# Patient Record
Sex: MALE | Race: WHITE | Employment: FULL TIME | URBAN - METROPOLITAN AREA
[De-identification: names, ages, dates, MRNs, and addresses within clinical notes are randomized per-mention and may not be internally consistent; named-entity substitution may affect disease eponyms.]

---

## 2017-03-30 ENCOUNTER — OFFICE VISIT (OUTPATIENT)
Dept: FAMILY MEDICINE CLINIC | Age: 42
End: 2017-03-30
Payer: COMMERCIAL

## 2017-03-30 ENCOUNTER — HOSPITAL ENCOUNTER (OUTPATIENT)
Age: 42
Discharge: HOME | End: 2017-03-30
Payer: COMMERCIAL

## 2017-03-30 VITALS
SYSTOLIC BLOOD PRESSURE: 140 MMHG | BODY MASS INDEX: 32.48 KG/M2 | OXYGEN SATURATION: 97 % | DIASTOLIC BLOOD PRESSURE: 80 MMHG | HEART RATE: 97 BPM | WEIGHT: 253 LBS

## 2017-03-30 DIAGNOSIS — B18.2 HEP C W/O COMA, CHRONIC (HCC): ICD-10-CM

## 2017-03-30 DIAGNOSIS — Z20.5 EXPOSURE TO HEPATITIS B: ICD-10-CM

## 2017-03-30 DIAGNOSIS — B18.2 HEP C W/O COMA, CHRONIC (HCC): Primary | ICD-10-CM

## 2017-03-30 DIAGNOSIS — Z20.6 EXPOSURE TO HIV: ICD-10-CM

## 2017-03-30 LAB
ALBUMIN SERPL-MCNC: 4 G/DL (ref 3.5–5.2)
ALBUMIN/GLOBULIN RATIO: ABNORMAL (ref 1–2.5)
ALP BLD-CCNC: 58 U/L (ref 40–129)
ALT SERPL-CCNC: 42 U/L (ref 5–41)
ANION GAP SERPL CALCULATED.3IONS-SCNC: 11 MMOL/L (ref 9–17)
AST SERPL-CCNC: 36 U/L
BILIRUB SERPL-MCNC: 0.73 MG/DL (ref 0.3–1.2)
BUN BLDV-MCNC: 13 MG/DL (ref 6–20)
BUN/CREAT BLD: 18 (ref 9–20)
CALCIUM SERPL-MCNC: 9.1 MG/DL (ref 8.6–10.4)
CHLORIDE BLD-SCNC: 102 MMOL/L (ref 98–107)
CO2: 26 MMOL/L (ref 20–31)
CREAT SERPL-MCNC: 0.72 MG/DL (ref 0.7–1.2)
GFR AFRICAN AMERICAN: >60 ML/MIN
GFR NON-AFRICAN AMERICAN: >60 ML/MIN
GFR SERPL CREATININE-BSD FRML MDRD: ABNORMAL ML/MIN/{1.73_M2}
GFR SERPL CREATININE-BSD FRML MDRD: ABNORMAL ML/MIN/{1.73_M2}
GLUCOSE BLD-MCNC: 99 MG/DL (ref 70–99)
HEPATITIS B CORE TOTAL ANTIBODY: REACTIVE
HIV AG/AB: NONREACTIVE
POTASSIUM SERPL-SCNC: 3.9 MMOL/L (ref 3.7–5.3)
SODIUM BLD-SCNC: 139 MMOL/L (ref 135–144)
TOTAL PROTEIN: 7.3 G/DL (ref 6.4–8.3)

## 2017-03-30 PROCEDURE — G8427 DOCREV CUR MEDS BY ELIG CLIN: HCPCS | Performed by: FAMILY MEDICINE

## 2017-03-30 PROCEDURE — 87522 HEPATITIS C REVRS TRNSCRPJ: CPT

## 2017-03-30 PROCEDURE — 99213 OFFICE O/P EST LOW 20 MIN: CPT | Performed by: FAMILY MEDICINE

## 2017-03-30 PROCEDURE — G8484 FLU IMMUNIZE NO ADMIN: HCPCS | Performed by: FAMILY MEDICINE

## 2017-03-30 PROCEDURE — 36415 COLL VENOUS BLD VENIPUNCTURE: CPT

## 2017-03-30 PROCEDURE — G8419 CALC BMI OUT NRM PARAM NOF/U: HCPCS | Performed by: FAMILY MEDICINE

## 2017-03-30 PROCEDURE — 87902 NFCT AGT GNTYP ALYS HEP C: CPT

## 2017-03-30 PROCEDURE — 80053 COMPREHEN METABOLIC PANEL: CPT

## 2017-03-30 PROCEDURE — 86704 HEP B CORE ANTIBODY TOTAL: CPT

## 2017-03-30 PROCEDURE — 4004F PT TOBACCO SCREEN RCVD TLK: CPT | Performed by: FAMILY MEDICINE

## 2017-03-30 PROCEDURE — 87389 HIV-1 AG W/HIV-1&-2 AB AG IA: CPT

## 2017-03-30 RX ORDER — NICOTINE 21 MG/24HR
1 PATCH, TRANSDERMAL 24 HOURS TRANSDERMAL EVERY 24 HOURS
Qty: 28 PATCH | Refills: 0 | Status: SHIPPED | OUTPATIENT
Start: 2017-03-30 | End: 2017-05-02

## 2017-03-30 RX ORDER — BUPRENORPHINE HYDROCHLORIDE, NALOXONE HYDROCHLORIDE 8; 2 MG/1; MG/1
FILM, SOLUBLE BUCCAL; SUBLINGUAL
Refills: 0 | COMMUNITY
Start: 2017-03-13 | End: 2017-09-01 | Stop reason: ALTCHOICE

## 2017-03-30 RX ORDER — ONDANSETRON 4 MG/1
4 TABLET, FILM COATED ORAL EVERY 8 HOURS PRN
Qty: 30 TABLET | Refills: 2 | Status: SHIPPED | OUTPATIENT
Start: 2017-03-30 | End: 2017-09-01 | Stop reason: ALTCHOICE

## 2017-03-30 RX ORDER — ALLOPURINOL 300 MG/1
TABLET ORAL
Qty: 30 TABLET | Refills: 5 | Status: SHIPPED | OUTPATIENT
Start: 2017-03-30 | End: 2017-09-01 | Stop reason: SDUPTHER

## 2017-03-31 ENCOUNTER — TELEPHONE (OUTPATIENT)
Dept: FAMILY MEDICINE CLINIC | Age: 42
End: 2017-03-31

## 2017-04-04 LAB
DIRECT EXAM: ABNORMAL
Lab: ABNORMAL
SPECIMEN DESCRIPTION: ABNORMAL
SPECIMEN DESCRIPTION: ABNORMAL
STATUS: ABNORMAL

## 2017-04-05 LAB
HCV QUANTITATIVE: NORMAL
HEPATITIS C GENOTYPE: NORMAL

## 2017-04-07 ENCOUNTER — TELEPHONE (OUTPATIENT)
Dept: FAMILY MEDICINE CLINIC | Age: 42
End: 2017-04-07

## 2017-04-09 ASSESSMENT — ENCOUNTER SYMPTOMS
WHEEZING: 0
SHORTNESS OF BREATH: 0
COUGH: 0
PHOTOPHOBIA: 0
ABDOMINAL PAIN: 0
NAUSEA: 0
CONSTIPATION: 0
ABDOMINAL DISTENTION: 0
BACK PAIN: 0
COLOR CHANGE: 0
DIARRHEA: 0
TROUBLE SWALLOWING: 0
VOMITING: 0
FACIAL SWELLING: 0

## 2017-04-11 ENCOUNTER — TELEPHONE (OUTPATIENT)
Dept: FAMILY MEDICINE CLINIC | Age: 42
End: 2017-04-11

## 2017-04-11 DIAGNOSIS — R76.8 HEPATITIS B CORE ANTIBODY POSITIVE: Primary | ICD-10-CM

## 2017-04-20 DIAGNOSIS — M54.50 CHRONIC BILATERAL LOW BACK PAIN WITHOUT SCIATICA: ICD-10-CM

## 2017-04-20 DIAGNOSIS — G89.29 CHRONIC BILATERAL LOW BACK PAIN WITHOUT SCIATICA: ICD-10-CM

## 2017-04-20 RX ORDER — LORAZEPAM 0.5 MG/1
0.5 TABLET ORAL 2 TIMES DAILY PRN
Qty: 6 TABLET | Refills: 0 | Status: SHIPPED | OUTPATIENT
Start: 2017-04-20 | End: 2017-09-01 | Stop reason: ALTCHOICE

## 2017-04-20 RX ORDER — GABAPENTIN 600 MG/1
600 TABLET ORAL 3 TIMES DAILY
Qty: 90 TABLET | Refills: 2 | Status: SHIPPED | OUTPATIENT
Start: 2017-04-20 | End: 2017-07-08 | Stop reason: SDUPTHER

## 2017-05-02 ENCOUNTER — OFFICE VISIT (OUTPATIENT)
Dept: FAMILY MEDICINE CLINIC | Age: 42
End: 2017-05-02
Payer: COMMERCIAL

## 2017-05-02 VITALS
HEART RATE: 74 BPM | WEIGHT: 255 LBS | OXYGEN SATURATION: 98 % | SYSTOLIC BLOOD PRESSURE: 115 MMHG | BODY MASS INDEX: 32.74 KG/M2 | DIASTOLIC BLOOD PRESSURE: 74 MMHG

## 2017-05-02 DIAGNOSIS — F34.1 DYSTHYMIA: Primary | ICD-10-CM

## 2017-05-02 PROCEDURE — 4004F PT TOBACCO SCREEN RCVD TLK: CPT | Performed by: FAMILY MEDICINE

## 2017-05-02 PROCEDURE — 99213 OFFICE O/P EST LOW 20 MIN: CPT | Performed by: FAMILY MEDICINE

## 2017-05-02 PROCEDURE — G8419 CALC BMI OUT NRM PARAM NOF/U: HCPCS | Performed by: FAMILY MEDICINE

## 2017-05-02 PROCEDURE — G8427 DOCREV CUR MEDS BY ELIG CLIN: HCPCS | Performed by: FAMILY MEDICINE

## 2017-05-14 ASSESSMENT — ENCOUNTER SYMPTOMS
CONSTIPATION: 0
PHOTOPHOBIA: 0
VOMITING: 0
SHORTNESS OF BREATH: 0
BACK PAIN: 0
ABDOMINAL DISTENTION: 0
ABDOMINAL PAIN: 0
NAUSEA: 0
COLOR CHANGE: 0
TROUBLE SWALLOWING: 0
FACIAL SWELLING: 0
COUGH: 0
DIARRHEA: 0
WHEEZING: 0

## 2017-07-06 RX ORDER — PANTOPRAZOLE SODIUM 40 MG/1
TABLET, DELAYED RELEASE ORAL
Qty: 30 TABLET | Refills: 5 | Status: SHIPPED | OUTPATIENT
Start: 2017-07-06 | End: 2018-01-22 | Stop reason: SDUPTHER

## 2017-07-08 DIAGNOSIS — M54.50 CHRONIC BILATERAL LOW BACK PAIN WITHOUT SCIATICA: ICD-10-CM

## 2017-07-08 DIAGNOSIS — G89.29 CHRONIC BILATERAL LOW BACK PAIN WITHOUT SCIATICA: ICD-10-CM

## 2017-07-10 RX ORDER — GABAPENTIN 600 MG/1
TABLET ORAL
Qty: 90 TABLET | Refills: 1 | Status: SHIPPED | OUTPATIENT
Start: 2017-07-10 | End: 2017-09-01 | Stop reason: SDUPTHER

## 2017-09-01 ENCOUNTER — OFFICE VISIT (OUTPATIENT)
Dept: FAMILY MEDICINE CLINIC | Age: 42
End: 2017-09-01

## 2017-09-01 ENCOUNTER — HOSPITAL ENCOUNTER (OUTPATIENT)
Age: 42
Discharge: HOME | End: 2017-09-01

## 2017-09-01 VITALS
SYSTOLIC BLOOD PRESSURE: 136 MMHG | HEIGHT: 74 IN | BODY MASS INDEX: 35.04 KG/M2 | HEART RATE: 99 BPM | WEIGHT: 273 LBS | DIASTOLIC BLOOD PRESSURE: 88 MMHG

## 2017-09-01 DIAGNOSIS — R22.31 MASS OF RIGHT HAND: ICD-10-CM

## 2017-09-01 DIAGNOSIS — F41.9 ANXIETY: Primary | ICD-10-CM

## 2017-09-01 DIAGNOSIS — G89.29 CHRONIC BILATERAL LOW BACK PAIN WITHOUT SCIATICA: ICD-10-CM

## 2017-09-01 DIAGNOSIS — M54.50 CHRONIC BILATERAL LOW BACK PAIN WITHOUT SCIATICA: ICD-10-CM

## 2017-09-01 DIAGNOSIS — F51.01 PRIMARY INSOMNIA: ICD-10-CM

## 2017-09-01 LAB
ALBUMIN SERPL-MCNC: 3.8 G/DL (ref 3.5–5.2)
ALBUMIN/GLOBULIN RATIO: ABNORMAL (ref 1–2.5)
ALP BLD-CCNC: 72 U/L (ref 40–129)
ALT SERPL-CCNC: 179 U/L (ref 5–41)
ANION GAP SERPL CALCULATED.3IONS-SCNC: 22 MMOL/L (ref 9–17)
AST SERPL-CCNC: 105 U/L
BILIRUB SERPL-MCNC: 0.34 MG/DL (ref 0.3–1.2)
BUN BLDV-MCNC: 13 MG/DL (ref 6–20)
BUN/CREAT BLD: 19 (ref 9–20)
CALCIUM SERPL-MCNC: 8.9 MG/DL (ref 8.6–10.4)
CHLORIDE BLD-SCNC: 104 MMOL/L (ref 98–107)
CO2: 15 MMOL/L (ref 20–31)
CREAT SERPL-MCNC: 0.67 MG/DL (ref 0.7–1.2)
GFR AFRICAN AMERICAN: >60 ML/MIN
GFR NON-AFRICAN AMERICAN: >60 ML/MIN
GFR SERPL CREATININE-BSD FRML MDRD: ABNORMAL ML/MIN/{1.73_M2}
GFR SERPL CREATININE-BSD FRML MDRD: ABNORMAL ML/MIN/{1.73_M2}
GLUCOSE BLD-MCNC: 155 MG/DL (ref 70–99)
HCT VFR BLD CALC: 48.6 % (ref 41–53)
HEMOGLOBIN: 17.1 G/DL (ref 13.5–17.5)
MCH RBC QN AUTO: 31.4 PG (ref 26–34)
MCHC RBC AUTO-ENTMCNC: 35.3 G/DL (ref 31–37)
MCV RBC AUTO: 89 FL (ref 80–100)
PDW BLD-RTO: 13 % (ref 12.1–15.2)
PLATELET # BLD: 161 K/UL (ref 140–450)
PMV BLD AUTO: NORMAL FL (ref 6–12)
POTASSIUM SERPL-SCNC: 4.1 MMOL/L (ref 3.7–5.3)
RBC # BLD: 5.46 M/UL (ref 4.5–5.9)
SODIUM BLD-SCNC: 141 MMOL/L (ref 135–144)
TOTAL PROTEIN: 7.3 G/DL (ref 6.4–8.3)
WBC # BLD: 8.6 K/UL (ref 3.5–11)

## 2017-09-01 PROCEDURE — 36415 COLL VENOUS BLD VENIPUNCTURE: CPT

## 2017-09-01 PROCEDURE — 85027 COMPLETE CBC AUTOMATED: CPT

## 2017-09-01 PROCEDURE — 83883 ASSAY NEPHELOMETRY NOT SPEC: CPT

## 2017-09-01 PROCEDURE — 99212 OFFICE O/P EST SF 10 MIN: CPT | Performed by: FAMILY MEDICINE

## 2017-09-01 PROCEDURE — 84450 TRANSFERASE (AST) (SGOT): CPT

## 2017-09-01 PROCEDURE — 84520 ASSAY OF UREA NITROGEN: CPT

## 2017-09-01 PROCEDURE — 84460 ALANINE AMINO (ALT) (SGPT): CPT

## 2017-09-01 PROCEDURE — 80053 COMPREHEN METABOLIC PANEL: CPT

## 2017-09-01 PROCEDURE — 87902 NFCT AGT GNTYP ALYS HEP C: CPT

## 2017-09-01 PROCEDURE — 82977 ASSAY OF GGT: CPT

## 2017-09-01 RX ORDER — ALLOPURINOL 300 MG/1
TABLET ORAL
Qty: 30 TABLET | Refills: 5 | Status: SHIPPED | OUTPATIENT
Start: 2017-09-01 | End: 2018-01-22 | Stop reason: SDUPTHER

## 2017-09-01 RX ORDER — CITALOPRAM 20 MG/1
20 TABLET ORAL DAILY
Qty: 30 TABLET | Refills: 3 | Status: SHIPPED | OUTPATIENT
Start: 2017-09-01 | End: 2018-01-22

## 2017-09-01 RX ORDER — GABAPENTIN 800 MG/1
TABLET ORAL
Qty: 90 TABLET | Refills: 1 | Status: SHIPPED | OUTPATIENT
Start: 2017-09-01 | End: 2017-11-06 | Stop reason: SDUPTHER

## 2017-09-05 LAB
ALANINE AMINOTRANSFERASE, FIBROMETER: 211 U/L (ref 5–50)
ALPHA-2-MACROGLOBULIN, FIBROMETER: 311 MG/DL (ref 131–293)
ASPARTATE AMINOTRANSFERASE, FIBROMETER: 123 U/L (ref 9–50)
CIRRHOMETER PATIENT SCORE: 0.15
EER FIBROMETER REPORT: ABNORMAL
FIBROMETER INTERPRETATION: ABNORMAL
FIBROMETER PATIENT SCORE: 0.89
FIBROMETER PLATELET COUNT: 161
FIBROMETER PROTHROMBIN INDEX: 87 % (ref 90–120)
FIBROSIS METAVIR CLASSIFICATION: ABNORMAL
GAMMA GLUTAMYL TRANSFERASE, FIBROMETER: 130 U/L (ref 7–51)
INFLAMETER METAVIR CLASSIFICATION: ABNORMAL
INFLAMETER PATIENT SCORE: 0.89
UREA NITROGEN, FIBROMETER: 14 MG/DL (ref 7–20)

## 2017-09-06 LAB
HCV QUANTITATIVE: NORMAL
HEPATITIS C GENOTYPE: NORMAL

## 2017-09-17 ASSESSMENT — ENCOUNTER SYMPTOMS
DIARRHEA: 0
CONSTIPATION: 0
COUGH: 0
SHORTNESS OF BREATH: 0
PHOTOPHOBIA: 0
ABDOMINAL DISTENTION: 0
TROUBLE SWALLOWING: 0
NAUSEA: 0
COLOR CHANGE: 0
VOMITING: 0
WHEEZING: 0
BACK PAIN: 1
FACIAL SWELLING: 0
ABDOMINAL PAIN: 0

## 2017-11-06 DIAGNOSIS — G89.29 CHRONIC BILATERAL LOW BACK PAIN WITHOUT SCIATICA: ICD-10-CM

## 2017-11-06 DIAGNOSIS — M54.50 CHRONIC BILATERAL LOW BACK PAIN WITHOUT SCIATICA: ICD-10-CM

## 2017-11-06 RX ORDER — GABAPENTIN 800 MG/1
TABLET ORAL
Qty: 90 TABLET | Refills: 1 | Status: SHIPPED | OUTPATIENT
Start: 2017-11-06 | End: 2018-01-19 | Stop reason: SDUPTHER

## 2018-01-19 DIAGNOSIS — M54.50 CHRONIC BILATERAL LOW BACK PAIN WITHOUT SCIATICA: ICD-10-CM

## 2018-01-19 DIAGNOSIS — G89.29 CHRONIC BILATERAL LOW BACK PAIN WITHOUT SCIATICA: ICD-10-CM

## 2018-01-19 RX ORDER — GABAPENTIN 800 MG/1
TABLET ORAL
Qty: 90 TABLET | Refills: 0 | Status: SHIPPED | OUTPATIENT
Start: 2018-01-19 | End: 2018-02-20 | Stop reason: SDUPTHER

## 2018-01-19 NOTE — TELEPHONE ENCOUNTER
Gabapentin 800 mg    DM-morris Higuera had an appointment for today but he had an emergency dentist appointment. He moved his appointment to Monday but will be out of this medication before.     Next Visit Date:  Future Appointments  Date Time Provider Debbie Rosales   1/19/2018 9:40 AM Tung Agee DO [employer] [facility] MED [facility]   1/22/2018 11:20 AM Tung Agee DO [facility] MED W       Health Maintenance   Topic Date Due    Diabetes screen  09/14/2015    Flu vaccine (1) 09/01/2017    Pneumococcal med risk (1 of 1 - PPSV23) 09/01/2018 (Originally 9/14/1994)    Lipid screen  03/09/2020    DTaP/Tdap/Td vaccine (2 - Td) 07/31/2026    HIV screen  Completed       No results found for: LABA1C          ( goal A1C is < 7)   No results found for: LABMICR  LDL Cholesterol (mg/dL)   Date Value   03/09/2015 50       (goal LDL is <100)   AST (U/L)   Date Value   09/01/2017 105 (H)     ALT (U/L)   Date Value   09/01/2017 179 (H)     BUN (mg/dL)   Date Value   09/01/2017 13     BP Readings from Last 3 Encounters:   09/01/17 136/88   05/02/17 115/74   03/30/17 140/80          (goal 120/80)    All Future Testing planned in [facility]  Lab Frequency Next Occurrence   Hepatitis B E Antibody Once 04/10/2018   Hepatitis B DNA, Ultraquantitative, PCR Once 04/10/2018   Hepatitis B E Antigen Once 04/10/2018               Patient Active Problem List:     HTN (hypertension)     Arthritis     Gout

## 2018-01-22 ENCOUNTER — OFFICE VISIT (OUTPATIENT)
Dept: FAMILY MEDICINE CLINIC | Age: 43
End: 2018-01-22
Payer: MEDICAID

## 2018-01-22 VITALS
HEART RATE: 114 BPM | SYSTOLIC BLOOD PRESSURE: 118 MMHG | DIASTOLIC BLOOD PRESSURE: 80 MMHG | OXYGEN SATURATION: 98 % | BODY MASS INDEX: 36.21 KG/M2 | WEIGHT: 282 LBS

## 2018-01-22 DIAGNOSIS — M1A.09X0 IDIOPATHIC CHRONIC GOUT OF MULTIPLE SITES WITHOUT TOPHUS: ICD-10-CM

## 2018-01-22 DIAGNOSIS — M25.511 ACUTE PAIN OF RIGHT SHOULDER: Primary | ICD-10-CM

## 2018-01-22 DIAGNOSIS — K21.9 GASTROESOPHAGEAL REFLUX DISEASE WITHOUT ESOPHAGITIS: ICD-10-CM

## 2018-01-22 DIAGNOSIS — B18.2 HEP C W/O COMA, CHRONIC (HCC): ICD-10-CM

## 2018-01-22 PROCEDURE — 4004F PT TOBACCO SCREEN RCVD TLK: CPT | Performed by: FAMILY MEDICINE

## 2018-01-22 PROCEDURE — G8417 CALC BMI ABV UP PARAM F/U: HCPCS | Performed by: FAMILY MEDICINE

## 2018-01-22 PROCEDURE — G8427 DOCREV CUR MEDS BY ELIG CLIN: HCPCS | Performed by: FAMILY MEDICINE

## 2018-01-22 PROCEDURE — G8484 FLU IMMUNIZE NO ADMIN: HCPCS | Performed by: FAMILY MEDICINE

## 2018-01-22 PROCEDURE — 99214 OFFICE O/P EST MOD 30 MIN: CPT | Performed by: FAMILY MEDICINE

## 2018-01-22 RX ORDER — SULFAMETHOXAZOLE AND TRIMETHOPRIM 800; 160 MG/1; MG/1
1 TABLET ORAL
COMMUNITY
Start: 2017-12-05 | End: 2018-01-22 | Stop reason: ALTCHOICE

## 2018-01-22 RX ORDER — SULFAMETHOXAZOLE AND TRIMETHOPRIM 800; 160 MG/1; MG/1
TABLET ORAL
Refills: 1 | COMMUNITY
Start: 2017-12-05 | End: 2018-01-22 | Stop reason: ALTCHOICE

## 2018-01-22 RX ORDER — HYDROCODONE BITARTRATE AND ACETAMINOPHEN 5; 325 MG/1; MG/1
TABLET ORAL
Refills: 0 | COMMUNITY
Start: 2017-11-27 | End: 2018-06-19 | Stop reason: ALTCHOICE

## 2018-01-22 RX ORDER — PANTOPRAZOLE SODIUM 40 MG/1
TABLET, DELAYED RELEASE ORAL
Qty: 30 TABLET | Refills: 5 | Status: SHIPPED | OUTPATIENT
Start: 2018-01-22 | End: 2018-06-19 | Stop reason: SDUPTHER

## 2018-01-22 RX ORDER — ALLOPURINOL 300 MG/1
TABLET ORAL
Qty: 30 TABLET | Refills: 5 | Status: SHIPPED | OUTPATIENT
Start: 2018-01-22 | End: 2018-06-19 | Stop reason: SDUPTHER

## 2018-01-22 NOTE — PROGRESS NOTES
Patient presents today for recheck. Patient has been doing well, had hand surgery done, tooth pulled and following up with the dentist, patient has been trying to stay healthy and has been sober for almost 1 year.

## 2018-01-22 NOTE — PROGRESS NOTES
HPI Notes    Name: Alida Abrams  : 1975         Chief Complaint:     Chief Complaint   Patient presents with    Depression     recheck. History of Present Illness:      Alida Abrams is a 43 y.o.  male who presents with Depression (recheck. )      Shoulder Pain    The pain is present in the right shoulder. This is a new problem. The current episode started 1 to 4 weeks ago. The problem occurs daily. The problem has been unchanged. The quality of the pain is described as aching and burning. The pain is moderate. Pertinent negatives include no fever, inability to bear weight, itching, limited range of motion, stiffness or tingling. He has tried NSAIDS for the symptoms. The treatment provided mild relief. His past medical history is significant for gout. Gastroesophageal Reflux   He complains of abdominal pain and heartburn. He reports no chest pain, no coughing, no nausea or no wheezing. This is a chronic problem. The current episode started more than 1 month ago. The problem has been waxing and waning. Pertinent negatives include no anemia, fatigue, melena, muscle weakness, orthopnea or weight loss. He has tried a PPI for the symptoms. The treatment provided significant relief. Patient is a history of hepatitis C. Patient is requesting to go follow-up with his gastroenterologist concerning treatment for this. Patient states that he has been doing well. Patient notes that he has been clean for some time and would like to undergo treatment for his hep C. Patient notes that he has a history of gout. Patient denies any recent flareups of this. Patient takes allopurinol for this without problems. Patient denies any side effects or adverse effects. Patient would like to continue this. No other acute problems or complaints.   Past Medical History:     Past Medical History:   Diagnosis Date    Chicken pox     Chronic back pain     Gout     Headache(784.0)     Hepatitis B 03/30/2017    Core antibody IgG/IgM -- positive    Hepatitis C 03/30/2017    Viral RNA by PCR -- detected    Hernia     Hypertension     Osteoarthritis       Reviewed all health maintenance requirements and ordered appropriate tests  Health Maintenance Due   Topic Date Due    Diabetes screen  09/14/2015    Flu vaccine (1) 09/01/2017       Past Surgical History:     No past surgical history on file. Medications:       Prior to Admission medications    Medication Sig Start Date End Date Taking? Authorizing Provider   HYDROcodone-acetaminophen (NORCO) 5-325 MG per tablet TAKE 1 TABLET BY MOUTH EVERY 4 TO 6 HOURS AS NEEDED FOR PAIN 11/27/17  Yes Historical Provider, MD   allopurinol (ZYLOPRIM) 300 MG tablet take 1 tablet by mouth once daily 1/22/18  Yes Beula Rinne, DO   pantoprazole (PROTONIX) 40 MG tablet TAKE 1 CAPSULE BY MOUTH EVERY DAY 1/22/18  Yes Beula Rinne, DO   gabapentin (NEURONTIN) 800 MG tablet TAKE 1 TABLET BY MOUTH THREE TIMES DAILY. 1/19/18 2/18/18 Yes Beula Rinne, DO        Allergies:       Penicillins    Social History:     Tobacco:    reports that he has been smoking Cigarettes. He has been smoking about 2.00 packs per day. He has never used smokeless tobacco.  Alcohol:      reports that he does not drink alcohol. Drug Use:  reports that he does not use drugs. Family History:     Family History   Problem Relation Age of Onset    Diabetes Mother     Diabetes Father     Heart Disease Brother        Review of Systems:     Positive and Negative as described in HPI    Review of Systems   Constitutional: Negative for activity change, appetite change, fatigue, fever, unexpected weight change and weight loss. HENT: Negative for ear pain, facial swelling and trouble swallowing. Eyes: Negative for photophobia and visual disturbance. Respiratory: Negative for cough, shortness of breath and wheezing. Cardiovascular: Negative for chest pain and palpitations. Gastrointestinal: Positive for abdominal pain and heartburn. Negative for abdominal distention, constipation, diarrhea, melena, nausea and vomiting. Endocrine: Negative for polydipsia, polyphagia and polyuria. Genitourinary: Negative for frequency and urgency. Musculoskeletal: Positive for arthralgias (Right shoulder pain) and gout. Negative for back pain, gait problem, joint swelling, myalgias, muscle weakness, neck pain, neck stiffness and stiffness. Skin: Negative for color change, itching and rash. Allergic/Immunologic: Negative for environmental allergies and food allergies. Neurological: Negative for dizziness, tingling, syncope, weakness, light-headedness and headaches. Hematological: Negative for adenopathy. Does not bruise/bleed easily. Psychiatric/Behavioral: Negative for dysphoric mood. The patient is not nervous/anxious. Physical Exam:     Physical Exam   Constitutional: He is oriented to person, place, and time. He appears well-developed and well-nourished. HENT:   Head: Normocephalic and atraumatic. Right Ear: External ear normal.   Left Ear: External ear normal.   Eyes: Conjunctivae and EOM are normal.   Neck: Normal range of motion. Neck supple. No thyromegaly present. Cardiovascular: Normal rate, regular rhythm and normal heart sounds. Exam reveals no gallop and no friction rub. No murmur heard. Pulmonary/Chest: Effort normal and breath sounds normal. No respiratory distress. He has no wheezes. He has no rales. He exhibits no tenderness. Abdominal: Soft. Bowel sounds are normal. He exhibits no distension. There is no tenderness. There is no rebound and no guarding. Musculoskeletal: Normal range of motion. He exhibits no edema. Right shoulder: He exhibits tenderness and pain. Lymphadenopathy:     He has no cervical adenopathy. Neurological: He is alert and oriented to person, place, and time. He exhibits normal muscle tone.  Coordination normal. Skin: Skin is warm and dry. No rash noted. No erythema. Psychiatric: He has a normal mood and affect. His behavior is normal. Judgment and thought content normal.   Nursing note and vitals reviewed. Vitals:  /80   Pulse 114   Wt 282 lb (127.9 kg)   SpO2 98%   BMI 36.21 kg/m²       Data:     Lab Results   Component Value Date     09/01/2017    K 4.1 09/01/2017     09/01/2017    CO2 15 09/01/2017    BUN 13 09/01/2017    CREATININE 0.67 09/01/2017    GLUCOSE 155 09/01/2017    GLUCOSE 127 12/26/2011    PROT 7.3 09/01/2017    LABALBU 3.8 09/01/2017    BILITOT 0.34 09/01/2017    ALKPHOS 72 09/01/2017     09/01/2017     09/01/2017     Lab Results   Component Value Date    WBC 8.6 09/01/2017    RBC 5.46 09/01/2017    RBC 4.41 12/26/2011    HGB 17.1 09/01/2017    HCT 48.6 09/01/2017    MCV 89.0 09/01/2017    MCH 31.4 09/01/2017    MCHC 35.3 09/01/2017    RDW 13.0 09/01/2017     09/01/2017     12/26/2011    MPV NOT REPORTED 09/01/2017     Lab Results   Component Value Date    TSH 2.82 03/09/2015     Lab Results   Component Value Date    CHOL 141 03/09/2015    HDL 18 03/09/2015          Assessment:       1. Acute pain of right shoulder  XR SHOULDER RIGHT (MIN 2 VIEWS)   2. Idiopathic chronic gout of multiple sites without tophus     3. Hep C w/o coma, chronic (HCC)  CANCELED: External Referral To Gastroenterology   4. Gastroesophageal reflux disease without esophagitis         Plan:   1. Acute pain of right shoulderno clear etiology, will get x-ray to further evaluate. Management will depend upon those results. Recommend judicious use of NSAIDs. 2.  Idiopathic goutcontrolled with allopurinol, no recent flareups. Will continue and will continue to follow. 3.  Hepatitis C without comapatient to follow-up with his gastroenterologist for treatment. 4.  GERDRx Protonix, patient to call if having any difficulties with this.             Completed Refills Requested Prescriptions     Signed Prescriptions Disp Refills    allopurinol (ZYLOPRIM) 300 MG tablet 30 tablet 5     Sig: take 1 tablet by mouth once daily    pantoprazole (PROTONIX) 40 MG tablet 30 tablet 5     Sig: TAKE 1 CAPSULE BY MOUTH EVERY DAY     Return in about 6 months (around 7/22/2018) for Chronic medical issues. Orders Placed This Encounter   Medications    allopurinol (ZYLOPRIM) 300 MG tablet     Sig: take 1 tablet by mouth once daily     Dispense:  30 tablet     Refill:  5    pantoprazole (PROTONIX) 40 MG tablet     Sig: TAKE 1 CAPSULE BY MOUTH EVERY DAY     Dispense:  30 tablet     Refill:  5     Orders Placed This Encounter   Procedures    XR SHOULDER RIGHT (MIN 2 VIEWS)     Standing Status:   Future     Standing Expiration Date:   1/22/2019     Order Specific Question:   Reason for exam:     Answer:   right shoulder pain s/p fall         Patient Instructions     SURVEY:    You may be receiving a survey from [employer] regarding your visit today. Please complete the survey to enable us to provide the highest quality of care to you and your family. If you cannot score us as very good on any question, please call the office to discuss how we could have made your experience exceptional.     Thank you. Electronically signed by Donal Hernandez DO on 2/4/2018 at 7:07 PM         Completed Refills   Requested Prescriptions     Signed Prescriptions Disp Refills    allopurinol (ZYLOPRIM) 300 MG tablet 30 tablet 5     Sig: take 1 tablet by mouth once daily    pantoprazole (PROTONIX) 40 MG tablet 30 tablet 5     Sig: TAKE 1 CAPSULE BY MOUTH EVERY DAY         Kamran Expose received counseling on the following healthy behaviors: nutrition and exercise  Reviewed prior labs and health maintenance. Continue current medications, diet and exercise. Discussed use, benefit, and side effects of prescribed medications. Barriers to medication compliance addressed.    Patient given educational materials -

## 2018-02-04 ASSESSMENT — ENCOUNTER SYMPTOMS
VOMITING: 0
NAUSEA: 0
COUGH: 0
WHEEZING: 0
CONSTIPATION: 0
COLOR CHANGE: 0
PHOTOPHOBIA: 0
ABDOMINAL PAIN: 1
ABDOMINAL DISTENTION: 0
BACK PAIN: 0
FACIAL SWELLING: 0
TROUBLE SWALLOWING: 0
HEARTBURN: 1
DIARRHEA: 0
SHORTNESS OF BREATH: 0

## 2018-04-19 ENCOUNTER — TELEPHONE (OUTPATIENT)
Dept: FAMILY MEDICINE CLINIC | Age: 43
End: 2018-04-19

## 2018-06-19 ENCOUNTER — OFFICE VISIT (OUTPATIENT)
Dept: FAMILY MEDICINE CLINIC | Age: 43
End: 2018-06-19
Payer: MEDICARE

## 2018-06-19 VITALS
BODY MASS INDEX: 36.08 KG/M2 | SYSTOLIC BLOOD PRESSURE: 126 MMHG | HEART RATE: 84 BPM | WEIGHT: 281 LBS | TEMPERATURE: 98.1 F | OXYGEN SATURATION: 96 % | DIASTOLIC BLOOD PRESSURE: 80 MMHG

## 2018-06-19 DIAGNOSIS — G89.29 CHRONIC BILATERAL LOW BACK PAIN WITHOUT SCIATICA: ICD-10-CM

## 2018-06-19 DIAGNOSIS — M1A.09X0 IDIOPATHIC CHRONIC GOUT OF MULTIPLE SITES WITHOUT TOPHUS: ICD-10-CM

## 2018-06-19 DIAGNOSIS — F11.11 HISTORY OF HEROIN ABUSE (HCC): ICD-10-CM

## 2018-06-19 DIAGNOSIS — M54.50 CHRONIC BILATERAL LOW BACK PAIN WITHOUT SCIATICA: ICD-10-CM

## 2018-06-19 DIAGNOSIS — K21.9 GASTROESOPHAGEAL REFLUX DISEASE WITHOUT ESOPHAGITIS: Primary | ICD-10-CM

## 2018-06-19 PROBLEM — R22.31 MASS OF RIGHT WRIST: Status: ACTIVE | Noted: 2017-10-10

## 2018-06-19 PROCEDURE — 4004F PT TOBACCO SCREEN RCVD TLK: CPT | Performed by: NURSE PRACTITIONER

## 2018-06-19 PROCEDURE — G8417 CALC BMI ABV UP PARAM F/U: HCPCS | Performed by: NURSE PRACTITIONER

## 2018-06-19 PROCEDURE — 99214 OFFICE O/P EST MOD 30 MIN: CPT | Performed by: NURSE PRACTITIONER

## 2018-06-19 PROCEDURE — G8427 DOCREV CUR MEDS BY ELIG CLIN: HCPCS | Performed by: NURSE PRACTITIONER

## 2018-06-19 RX ORDER — GABAPENTIN 800 MG/1
800 TABLET ORAL 3 TIMES DAILY
Qty: 90 TABLET | Refills: 5 | Status: SHIPPED | OUTPATIENT
Start: 2018-06-19 | End: 2018-12-22 | Stop reason: SDUPTHER

## 2018-06-19 RX ORDER — PANTOPRAZOLE SODIUM 40 MG/1
TABLET, DELAYED RELEASE ORAL
Qty: 30 TABLET | Refills: 5 | Status: SHIPPED | OUTPATIENT
Start: 2018-06-19 | End: 2019-04-01 | Stop reason: SDUPTHER

## 2018-06-19 RX ORDER — ALLOPURINOL 300 MG/1
TABLET ORAL
Qty: 30 TABLET | Refills: 5 | Status: SHIPPED | OUTPATIENT
Start: 2018-06-19 | End: 2019-04-01 | Stop reason: SDUPTHER

## 2018-06-19 ASSESSMENT — ENCOUNTER SYMPTOMS
ABDOMINAL PAIN: 0
BELCHING: 0
HEARTBURN: 0
BACK PAIN: 1

## 2018-06-19 ASSESSMENT — PATIENT HEALTH QUESTIONNAIRE - PHQ9
2. FEELING DOWN, DEPRESSED OR HOPELESS: 1
1. LITTLE INTEREST OR PLEASURE IN DOING THINGS: 1
SUM OF ALL RESPONSES TO PHQ QUESTIONS 1-9: 2
SUM OF ALL RESPONSES TO PHQ9 QUESTIONS 1 & 2: 2

## 2018-12-22 DIAGNOSIS — G89.29 CHRONIC BILATERAL LOW BACK PAIN WITHOUT SCIATICA: ICD-10-CM

## 2018-12-22 DIAGNOSIS — M54.50 CHRONIC BILATERAL LOW BACK PAIN WITHOUT SCIATICA: ICD-10-CM

## 2018-12-24 RX ORDER — GABAPENTIN 800 MG/1
TABLET ORAL
Qty: 90 TABLET | Refills: 2 | Status: SHIPPED | OUTPATIENT
Start: 2018-12-24 | End: 2019-04-01 | Stop reason: SDUPTHER

## 2019-04-01 ENCOUNTER — HOSPITAL ENCOUNTER (OUTPATIENT)
Age: 44
Discharge: HOME | End: 2019-04-01
Payer: MEDICARE

## 2019-04-01 ENCOUNTER — OFFICE VISIT (OUTPATIENT)
Dept: FAMILY MEDICINE CLINIC | Age: 44
End: 2019-04-01
Payer: MEDICARE

## 2019-04-01 VITALS
SYSTOLIC BLOOD PRESSURE: 136 MMHG | HEART RATE: 112 BPM | BODY MASS INDEX: 36.96 KG/M2 | DIASTOLIC BLOOD PRESSURE: 86 MMHG | WEIGHT: 288 LBS | HEIGHT: 74 IN | OXYGEN SATURATION: 98 %

## 2019-04-01 DIAGNOSIS — M54.50 CHRONIC BILATERAL LOW BACK PAIN WITHOUT SCIATICA: ICD-10-CM

## 2019-04-01 DIAGNOSIS — K21.9 GASTROESOPHAGEAL REFLUX DISEASE WITHOUT ESOPHAGITIS: ICD-10-CM

## 2019-04-01 DIAGNOSIS — F11.11 HISTORY OF HEROIN ABUSE (HCC): ICD-10-CM

## 2019-04-01 DIAGNOSIS — I10 ESSENTIAL HYPERTENSION: ICD-10-CM

## 2019-04-01 DIAGNOSIS — G89.29 CHRONIC BILATERAL LOW BACK PAIN WITHOUT SCIATICA: ICD-10-CM

## 2019-04-01 DIAGNOSIS — I10 ESSENTIAL HYPERTENSION: Primary | ICD-10-CM

## 2019-04-01 LAB
ABSOLUTE EOS #: 0.2 K/UL (ref 0–0.4)
ABSOLUTE IMMATURE GRANULOCYTE: ABNORMAL K/UL (ref 0–0.3)
ABSOLUTE LYMPH #: 2.3 K/UL (ref 1–4.8)
ABSOLUTE MONO #: 0.9 K/UL (ref 0–1)
ALBUMIN SERPL-MCNC: 4.4 G/DL (ref 3.5–5.2)
ALBUMIN/GLOBULIN RATIO: ABNORMAL (ref 1–2.5)
ALP BLD-CCNC: 84 U/L (ref 40–129)
ALT SERPL-CCNC: 170 U/L (ref 5–41)
AMPHETAMINE SCREEN URINE: NEGATIVE
ANION GAP SERPL CALCULATED.3IONS-SCNC: 18 MMOL/L (ref 9–17)
AST SERPL-CCNC: 111 U/L
BARBITURATE SCREEN URINE: NEGATIVE
BASOPHILS # BLD: 1 % (ref 0–2)
BASOPHILS ABSOLUTE: 0.1 K/UL (ref 0–0.2)
BENZODIAZEPINE SCREEN, URINE: NEGATIVE
BILIRUB SERPL-MCNC: 0.57 MG/DL (ref 0.3–1.2)
BUN BLDV-MCNC: 14 MG/DL (ref 6–20)
BUN/CREAT BLD: 23 (ref 9–20)
BUPRENORPHINE URINE: NORMAL
CALCIUM SERPL-MCNC: 9.7 MG/DL (ref 8.6–10.4)
CANNABINOID SCREEN URINE: NEGATIVE
CHLORIDE BLD-SCNC: 102 MMOL/L (ref 98–107)
CHOLESTEROL/HDL RATIO: 11.7
CHOLESTEROL: 233 MG/DL
CO2: 17 MMOL/L (ref 20–31)
COCAINE METABOLITE, URINE: NEGATIVE
CREAT SERPL-MCNC: 0.61 MG/DL (ref 0.7–1.2)
DIFFERENTIAL TYPE: YES
EOSINOPHILS RELATIVE PERCENT: 2 % (ref 0–5)
GFR AFRICAN AMERICAN: >60 ML/MIN
GFR NON-AFRICAN AMERICAN: >60 ML/MIN
GFR SERPL CREATININE-BSD FRML MDRD: ABNORMAL ML/MIN/{1.73_M2}
GFR SERPL CREATININE-BSD FRML MDRD: ABNORMAL ML/MIN/{1.73_M2}
GLUCOSE BLD-MCNC: 144 MG/DL (ref 70–99)
HCT VFR BLD CALC: 49.7 % (ref 41–53)
HDLC SERPL-MCNC: 20 MG/DL
HEMOGLOBIN: 17.2 G/DL (ref 13.5–17.5)
IMMATURE GRANULOCYTES: ABNORMAL %
INR BLD: 1.1
LDL CHOLESTEROL DIRECT: 53 MG/DL
LDL CHOLESTEROL: ABNORMAL MG/DL (ref 0–130)
LYMPHOCYTES # BLD: 23 % (ref 13–44)
MCH RBC QN AUTO: 30.8 PG (ref 26–34)
MCHC RBC AUTO-ENTMCNC: 34.5 G/DL (ref 31–37)
MCV RBC AUTO: 89.2 FL (ref 80–100)
MDMA URINE: NORMAL
METHADONE SCREEN, URINE: NEGATIVE
METHAMPHETAMINE, URINE: NEGATIVE
MONOCYTES # BLD: 9 % (ref 5–9)
NRBC AUTOMATED: ABNORMAL PER 100 WBC
OPIATES, URINE: NEGATIVE
OXYCODONE SCREEN URINE: NEGATIVE
PATIENT FASTING?: YES
PDW BLD-RTO: 12.9 % (ref 12.1–15.2)
PHENCYCLIDINE, URINE: NEGATIVE
PLATELET # BLD: 177 K/UL (ref 140–450)
PLATELET ESTIMATE: ABNORMAL
PMV BLD AUTO: ABNORMAL FL (ref 6–12)
POTASSIUM SERPL-SCNC: 4.5 MMOL/L (ref 3.7–5.3)
PROPOXYPHENE, URINE: NEGATIVE
PROTHROMBIN TIME: 11.1 SEC (ref 9–11.6)
RBC # BLD: 5.58 M/UL (ref 4.5–5.9)
RBC # BLD: ABNORMAL 10*6/UL
SEG NEUTROPHILS: 65 % (ref 39–75)
SEGMENTED NEUTROPHILS ABSOLUTE COUNT: 6.7 K/UL (ref 2.1–6.5)
SODIUM BLD-SCNC: 137 MMOL/L (ref 135–144)
TEST INFORMATION: NORMAL
TOTAL PROTEIN: 8.1 G/DL (ref 6.4–8.3)
TRICYCLIC ANTIDEPRESSANTS, UR: NEGATIVE
TRIGL SERPL-MCNC: 1229 MG/DL
VLDLC SERPL CALC-MCNC: ABNORMAL MG/DL (ref 1–30)
WBC # BLD: 10.1 K/UL (ref 3.5–11)
WBC # BLD: ABNORMAL 10*3/UL

## 2019-04-01 PROCEDURE — G8417 CALC BMI ABV UP PARAM F/U: HCPCS | Performed by: NURSE PRACTITIONER

## 2019-04-01 PROCEDURE — 83721 ASSAY OF BLOOD LIPOPROTEIN: CPT

## 2019-04-01 PROCEDURE — 85025 COMPLETE CBC W/AUTO DIFF WBC: CPT

## 2019-04-01 PROCEDURE — 4004F PT TOBACCO SCREEN RCVD TLK: CPT | Performed by: NURSE PRACTITIONER

## 2019-04-01 PROCEDURE — 85610 PROTHROMBIN TIME: CPT

## 2019-04-01 PROCEDURE — G8427 DOCREV CUR MEDS BY ELIG CLIN: HCPCS | Performed by: NURSE PRACTITIONER

## 2019-04-01 PROCEDURE — 83036 HEMOGLOBIN GLYCOSYLATED A1C: CPT

## 2019-04-01 PROCEDURE — 80306 DRUG TEST PRSMV INSTRMNT: CPT

## 2019-04-01 PROCEDURE — 99214 OFFICE O/P EST MOD 30 MIN: CPT | Performed by: NURSE PRACTITIONER

## 2019-04-01 PROCEDURE — 80061 LIPID PANEL: CPT

## 2019-04-01 PROCEDURE — 80053 COMPREHEN METABOLIC PANEL: CPT

## 2019-04-01 PROCEDURE — 36415 COLL VENOUS BLD VENIPUNCTURE: CPT

## 2019-04-01 RX ORDER — LISINOPRIL 10 MG/1
10 TABLET ORAL DAILY
Refills: 2 | COMMUNITY
Start: 2019-03-19 | End: 2019-04-01 | Stop reason: SDUPTHER

## 2019-04-01 RX ORDER — LISINOPRIL 10 MG/1
10 TABLET ORAL DAILY
Qty: 30 TABLET | Refills: 5 | Status: SHIPPED | OUTPATIENT
Start: 2019-04-01 | End: 2019-10-01 | Stop reason: SDUPTHER

## 2019-04-01 RX ORDER — PANTOPRAZOLE SODIUM 40 MG/1
TABLET, DELAYED RELEASE ORAL
Qty: 30 TABLET | Refills: 5 | Status: SHIPPED | OUTPATIENT
Start: 2019-04-01 | End: 2019-10-01 | Stop reason: SDUPTHER

## 2019-04-01 RX ORDER — ALLOPURINOL 300 MG/1
TABLET ORAL
Qty: 30 TABLET | Refills: 5 | Status: SHIPPED | OUTPATIENT
Start: 2019-04-01 | End: 2019-10-01 | Stop reason: SDUPTHER

## 2019-04-01 RX ORDER — GABAPENTIN 800 MG/1
TABLET ORAL
Qty: 90 TABLET | Refills: 5 | Status: SHIPPED | OUTPATIENT
Start: 2019-04-01 | End: 2019-10-01 | Stop reason: SDUPTHER

## 2019-04-01 ASSESSMENT — ENCOUNTER SYMPTOMS
VOMITING: 0
CONSTIPATION: 0
BLOOD IN STOOL: 0
SHORTNESS OF BREATH: 0
NAUSEA: 0
SORE THROAT: 0
BACK PAIN: 1
DIARRHEA: 0
COUGH: 0
HEARTBURN: 0
ABDOMINAL PAIN: 0
ORTHOPNEA: 0

## 2019-04-01 NOTE — PROGRESS NOTES
HPI Notes    Name: Russ Roper  : 1975         Chief Complaint:     Chief Complaint   Patient presents with    Hypertension    Gastroesophageal Reflux       History of Present Illness:        Hypertension   This is a chronic problem. The current episode started more than 1 year ago. The problem is controlled. Pertinent negatives include no chest pain, headaches, neck pain, orthopnea, palpitations, peripheral edema or shortness of breath. Risk factors for coronary artery disease include male gender, obesity, smoking/tobacco exposure and sedentary lifestyle. Past treatments include ACE inhibitors. The current treatment provides moderate improvement. There are no compliance problems. Gastroesophageal Reflux   He reports no abdominal pain, no chest pain, no coughing, no heartburn, no nausea or no sore throat. This is a chronic problem. The current episode started more than 1 year ago. The problem occurs rarely (as long on PPI). The problem has been resolved. Nothing aggravates the symptoms. Risk factors include caffeine use, ETOH use, lack of exercise and obesity. He has tried a PPI for the symptoms. The treatment provided moderate relief. Back Pain   This is a chronic problem. The current episode started more than 1 year ago. The problem has been waxing and waning since onset. The pain is present in the lumbar spine. The quality of the pain is described as aching. The pain does not radiate. The pain is moderate. The symptoms are aggravated by bending. Pertinent negatives include no abdominal pain, chest pain, dysuria, fever, headaches or weakness. He has tried NSAIDs for the symptoms.        Past Medical History:     Past Medical History:   Diagnosis Date    Chicken pox     Chronic back pain     Gout     Headache(784.0)     Hepatitis B 2017    Core antibody IgG/IgM -- positive    Hepatitis C 2017    Viral RNA by PCR -- detected    Hernia     Hypertension     Osteoarthritis is 6.   Skin: Skin is warm, dry and intact. No rash noted. Psychiatric: He has a normal mood and affect. His speech is normal and behavior is normal. Judgment and thought content normal. Cognition and memory are normal.   Nursing note and vitals reviewed. Data:     Lab Results   Component Value Date     09/01/2017    K 4.1 09/01/2017     09/01/2017    CO2 15 09/01/2017    BUN 13 09/01/2017    CREATININE 0.67 09/01/2017    GLUCOSE 155 09/01/2017    GLUCOSE 127 12/26/2011    PROT 7.3 09/01/2017    LABALBU 3.8 09/01/2017    BILITOT 0.34 09/01/2017    ALKPHOS 72 09/01/2017     09/01/2017     09/01/2017     Lab Results   Component Value Date    WBC 8.6 09/01/2017    RBC 5.46 09/01/2017    RBC 4.41 12/26/2011    HGB 17.1 09/01/2017    HCT 48.6 09/01/2017    MCV 89.0 09/01/2017    MCH 31.4 09/01/2017    MCHC 35.3 09/01/2017    RDW 13.0 09/01/2017     09/01/2017     12/26/2011    MPV NOT REPORTED 09/01/2017     Lab Results   Component Value Date    TSH 2.82 03/09/2015     Lab Results   Component Value Date    CHOL 141 03/09/2015    HDL 18 03/09/2015          Assessment & Plan        Diagnosis Orders   1. Essential hypertension  --BP controlled at this time. Continue same medications  CBC Auto Differential    Comprehensive Metabolic Panel    Lipid Panel    Protime-INR   2. Gastroesophageal reflux disease without esophagitis  --Symptoms well controlled as long as patient is on a PPI. CBC Auto Differential    Comprehensive Metabolic Panel    Lipid Panel    Protime-INR   3. Chronic bilateral low back pain without sciatica  --Pain controlled with Neurontin.   gabapentin (NEURONTIN) 800 MG tablet   4. History of heroin abuse   --Will send for random drug screen. Patient does admit to occasional marijuana use.    Urine Drug Screen       Controlled Substances Monitoring:     RX Monitoring 4/1/2019   Attestation The Prescription Monitoring Report for this patient was reviewed Coumadin Dose? Answer:   0    Urine Drug Screen         Patient Instructions     SURVEY:    You may be receiving a survey from [employer] regarding your visit today. Please complete the survey to enable us to provide the highest quality of care to you and your family. If you cannot score us a very good on any question, please call the office to discuss how we could of made your experience a very good one. Thank you. Electronically signed by J CARLOS Locke CNP on 4/1/2019 at 12:53 PM           Completed Refills      Requested Prescriptions     Signed Prescriptions Disp Refills    lisinopril (PRINIVIL;ZESTRIL) 10 MG tablet 30 tablet 5     Sig: Take 1 tablet by mouth daily    pantoprazole (PROTONIX) 40 MG tablet 30 tablet 5     Sig: TAKE 1 CAPSULE BY MOUTH EVERY DAY    allopurinol (ZYLOPRIM) 300 MG tablet 30 tablet 5     Sig: take 1 tablet by mouth once daily    gabapentin (NEURONTIN) 800 MG tablet 90 tablet 5     Sig: TAKE 1 TABLET BY MOUTH THREE TIMES DAILY         Javy Cifuentes received counseling on the following healthy behaviors: nutrition, exercise, medication adherence and tobacco cessation  Reviewed prior labs and health maintenance. Continue current medications, diet and exercise. Discussed use, benefit, and side effects of prescribed medications. Barriers to medication compliance addressed. Patient given educational materials - see patient instructions. All patient questions answered. Patient voiced understanding.

## 2019-04-01 NOTE — PATIENT INSTRUCTIONS
SURVEY:    You may be receiving a survey from [employer] regarding your visit today. Please complete the survey to enable us to provide the highest quality of care to you and your family. If you cannot score us a very good on any question, please call the office to discuss how we could of made your experience a very good one. Thank you.

## 2019-04-02 ENCOUNTER — TELEPHONE (OUTPATIENT)
Dept: FAMILY MEDICINE CLINIC | Age: 44
End: 2019-04-02

## 2019-04-02 DIAGNOSIS — R73.01 IFG (IMPAIRED FASTING GLUCOSE): Primary | ICD-10-CM

## 2019-04-02 LAB
ESTIMATED AVERAGE GLUCOSE: 117 MG/DL
HBA1C MFR BLD: 5.7 % (ref 4.8–5.9)

## 2019-04-02 RX ORDER — ATORVASTATIN CALCIUM 80 MG/1
80 TABLET, FILM COATED ORAL DAILY
Qty: 90 TABLET | Refills: 1 | Status: SHIPPED | OUTPATIENT
Start: 2019-04-02 | End: 2019-09-12 | Stop reason: SDUPTHER

## 2019-04-02 NOTE — TELEPHONE ENCOUNTER
Pt informed willing to start atorvastatin 9 med sent to ddm morris) also willing to have a1c done so I ordered and faxed that to the lab   Pt asking what your thoughts are on the TSH that is in care everywhere from the other doctor he saw in [facility]   He was wondering if you tested his tsh and I told him that was not done at this time so he is asking that you review the one from [facility]

## 2019-04-02 NOTE — TELEPHONE ENCOUNTER
----- Message from J CARLOS Tidwell CNP sent at 4/1/2019  5:17 PM EDT -----  Please add an A1c to labs. Liver enzymes are very high. Needs to stop all alcohol and tylenol. Needs to continue seeing GI for treatment of Hepatitis. Lipid panel is bad. Total is high, HDL low, triglycerides very elevated. Patient's ASCVD risk is 24.62%. Needs to be on high intensity statin. Atorvastatin 80 mg by mouth daily. Sarita Pulido thank you for giving us a urine sample. The urine was negative for all controlled substances and drug.   Thank you

## 2019-04-02 NOTE — TELEPHONE ENCOUNTER
Message left with patient regarding lab results and recommendations.      Told patient to call our office regarding results and need to start atorvastatin

## 2019-09-12 RX ORDER — ATORVASTATIN CALCIUM 80 MG/1
80 TABLET, FILM COATED ORAL DAILY
Qty: 90 TABLET | Refills: 1 | Status: SHIPPED | OUTPATIENT
Start: 2019-09-12 | End: 2020-03-11

## 2019-10-01 ENCOUNTER — OFFICE VISIT (OUTPATIENT)
Dept: FAMILY MEDICINE CLINIC | Age: 44
End: 2019-10-01
Payer: MEDICARE

## 2019-10-01 VITALS
OXYGEN SATURATION: 97 % | SYSTOLIC BLOOD PRESSURE: 136 MMHG | WEIGHT: 288 LBS | HEART RATE: 88 BPM | BODY MASS INDEX: 36.98 KG/M2 | DIASTOLIC BLOOD PRESSURE: 92 MMHG | TEMPERATURE: 97.8 F

## 2019-10-01 DIAGNOSIS — M1A.09X0 IDIOPATHIC CHRONIC GOUT OF MULTIPLE SITES WITHOUT TOPHUS: ICD-10-CM

## 2019-10-01 DIAGNOSIS — K21.9 GASTROESOPHAGEAL REFLUX DISEASE WITHOUT ESOPHAGITIS: ICD-10-CM

## 2019-10-01 DIAGNOSIS — B18.2 CHRONIC HEPATITIS C WITHOUT HEPATIC COMA (HCC): ICD-10-CM

## 2019-10-01 DIAGNOSIS — M54.50 CHRONIC BILATERAL LOW BACK PAIN WITHOUT SCIATICA: ICD-10-CM

## 2019-10-01 DIAGNOSIS — G89.29 CHRONIC BILATERAL LOW BACK PAIN WITHOUT SCIATICA: ICD-10-CM

## 2019-10-01 DIAGNOSIS — I10 ESSENTIAL HYPERTENSION: Primary | ICD-10-CM

## 2019-10-01 DIAGNOSIS — E78.2 MIXED HYPERLIPIDEMIA: ICD-10-CM

## 2019-10-01 PROCEDURE — G8417 CALC BMI ABV UP PARAM F/U: HCPCS | Performed by: NURSE PRACTITIONER

## 2019-10-01 PROCEDURE — 99214 OFFICE O/P EST MOD 30 MIN: CPT | Performed by: NURSE PRACTITIONER

## 2019-10-01 PROCEDURE — G8427 DOCREV CUR MEDS BY ELIG CLIN: HCPCS | Performed by: NURSE PRACTITIONER

## 2019-10-01 PROCEDURE — G8484 FLU IMMUNIZE NO ADMIN: HCPCS | Performed by: NURSE PRACTITIONER

## 2019-10-01 PROCEDURE — 4004F PT TOBACCO SCREEN RCVD TLK: CPT | Performed by: NURSE PRACTITIONER

## 2019-10-01 RX ORDER — PANTOPRAZOLE SODIUM 40 MG/1
TABLET, DELAYED RELEASE ORAL
Qty: 30 TABLET | Refills: 5 | Status: SHIPPED | OUTPATIENT
Start: 2019-10-01 | End: 2020-03-26 | Stop reason: SDUPTHER

## 2019-10-01 RX ORDER — ALLOPURINOL 300 MG/1
TABLET ORAL
Qty: 30 TABLET | Refills: 5 | Status: SHIPPED | OUTPATIENT
Start: 2019-10-01 | End: 2020-03-26 | Stop reason: SDUPTHER

## 2019-10-01 RX ORDER — LISINOPRIL 20 MG/1
20 TABLET ORAL DAILY
Qty: 30 TABLET | Refills: 5 | Status: SHIPPED | OUTPATIENT
Start: 2019-10-01 | End: 2020-03-26 | Stop reason: SDUPTHER

## 2019-10-01 RX ORDER — GABAPENTIN 800 MG/1
TABLET ORAL
Qty: 90 TABLET | Refills: 5 | Status: SHIPPED | OUTPATIENT
Start: 2019-10-01 | End: 2020-03-26 | Stop reason: SDUPTHER

## 2019-10-01 RX ORDER — GABAPENTIN 800 MG/1
TABLET ORAL
Qty: 90 TABLET | Refills: 5 | OUTPATIENT
Start: 2019-10-01

## 2019-10-01 ASSESSMENT — ENCOUNTER SYMPTOMS
BACK PAIN: 0
BLURRED VISION: 0
SORE THROAT: 0
COUGH: 0
ABDOMINAL PAIN: 0
DIARRHEA: 0
HEARTBURN: 0
CONSTIPATION: 0
NAUSEA: 0
BLOOD IN STOOL: 0
SHORTNESS OF BREATH: 0
VOMITING: 0

## 2019-10-01 ASSESSMENT — PATIENT HEALTH QUESTIONNAIRE - PHQ9
SUM OF ALL RESPONSES TO PHQ9 QUESTIONS 1 & 2: 0
2. FEELING DOWN, DEPRESSED OR HOPELESS: 0
SUM OF ALL RESPONSES TO PHQ QUESTIONS 1-9: 0
1. LITTLE INTEREST OR PLEASURE IN DOING THINGS: 0
SUM OF ALL RESPONSES TO PHQ QUESTIONS 1-9: 0

## 2020-03-11 RX ORDER — ATORVASTATIN CALCIUM 80 MG/1
80 TABLET, FILM COATED ORAL DAILY
Qty: 90 TABLET | Refills: 1 | Status: SHIPPED | OUTPATIENT
Start: 2020-03-11 | End: 2020-12-10 | Stop reason: SDUPTHER

## 2020-03-25 NOTE — TELEPHONE ENCOUNTER
Last visit:  10/1/2019  Next Visit Date:    Future Appointments   Date Time Provider Debbie Rosales   4/2/2020  3:40 PM J CARLOS Menjivar CNP MED [facility]     Last Med refill:    Medication List:  Prior to Admission medications    Medication Sig Start Date End Date Taking?  Authorizing Provider   atorvastatin (LIPITOR) 80 MG tablet TAKE 1 TABLET BY MOUTH DAILY 3/11/20   J CARLOS Menjivar CNP   lisinopril (PRINIVIL;ZESTRIL) 20 MG tablet Take 1 tablet by mouth daily 10/1/19   J CARLOS Menjivar CNP   pantoprazole (PROTONIX) 40 MG tablet TAKE 1 CAPSULE BY MOUTH EVERY DAY 10/1/19   J CARLOS Menjivar CNP   allopurinol (ZYLOPRIM) 300 MG tablet take 1 tablet by mouth once daily 10/1/19   J CARLOS Menjivar CNP   gabapentin (NEURONTIN) 800 MG tablet TAKE 1 TABLET BY MOUTH THREE TIMES DAILY 10/1/19 4/1/20  J CARLOS Menjivar CNP       Allergies:  Penicillins    Hemoglobin A1C (%)   Date Value   04/01/2019 5.7             ( goal A1C is < 7)   No results found for: LABMICR  LDL Cholesterol (mg/dL)   Date Value   04/01/2019 03/09/2015 50       (goal LDL is <100)   AST (U/L)   Date Value   04/01/2019 111 (H)     ALT (U/L)   Date Value   04/01/2019 170 (H)     BUN (mg/dL)   Date Value   04/01/2019 14     BP Readings from Last 3 Encounters:   10/01/19 (!) 136/92   04/01/19 136/86   06/19/18 126/80          (goal 120/80)

## 2020-03-25 NOTE — TELEPHONE ENCOUNTER
Patient is wanting to know if he can just have his RX sent in and then schedule at a later date? Please let patient know.     Lisinopril 20 mg  Pantoprazole 40 mg  Allopurinol 300 mg  Gabapentin 800 mg    [facility]  Maintenance   Topic Date Due    Hepatitis A vaccine (1 of 2 - Risk 2-dose series) 09/14/1976    Hepatitis B vaccine (1 of 3 - Risk 3-dose series) 09/14/1994    A1C test (Diabetic or Prediabetic)  04/01/2020    Lipid screen  04/01/2020    Potassium monitoring  04/01/2020    Creatinine monitoring  04/01/2020    Flu vaccine (1) 10/01/2020 (Originally 9/1/2019)    Pneumococcal 0-64 years Vaccine (1 of 1 - PPSV23) 10/01/2020 (Originally 9/14/1981)    DTaP/Tdap/Td vaccine (2 - Td) 07/31/2026    HIV screen  Completed    Hib vaccine  Aged Out    Meningococcal (ACWY) vaccine  Aged Out             (applicable per patient's age: Cancer Screenings, Depression Screening, Fall Risk Screening, Immunizations)    Hemoglobin A1C (%)   Date Value   04/01/2019 5.7     LDL Cholesterol (mg/dL)   Date Value   04/01/2019          AST (U/L)   Date Value   04/01/2019 111 (H)     ALT (U/L)   Date Value   04/01/2019 170 (H)     BUN (mg/dL)   Date Value   04/01/2019 14      (goal A1C is < 7)   (goal LDL is <100) need 30-50% reduction from baseline     BP Readings from Last 3 Encounters:   10/01/19 (!) 136/92   04/01/19 136/86   06/19/18 126/80    (goal /80)      All Future Testing planned in [facility]:  Lab Frequency Next Occurrence   Hemoglobin A1C Once 04/02/2020       Next Visit Date:  Future Appointments   Date Time Provider Debbie Rosales   4/2/2020  3:40 PM J CARLOS Reid - [facility]            Patient Active Problem List:     HTN (hypertension)     Arthritis     Gout     Mass of right wrist     Gastroesophageal reflux disease without esophagitis     History of heroin abuse ([facility] [address]

## 2020-03-26 RX ORDER — LISINOPRIL 20 MG/1
20 TABLET ORAL DAILY
Qty: 30 TABLET | Refills: 5 | Status: SHIPPED | OUTPATIENT
Start: 2020-03-26 | End: 2020-08-20 | Stop reason: SDUPTHER

## 2020-03-26 RX ORDER — ALLOPURINOL 300 MG/1
TABLET ORAL
Qty: 30 TABLET | Refills: 5 | Status: SHIPPED | OUTPATIENT
Start: 2020-03-26 | End: 2020-10-12

## 2020-03-26 RX ORDER — GABAPENTIN 800 MG/1
TABLET ORAL
Qty: 90 TABLET | Refills: 5 | Status: SHIPPED | OUTPATIENT
Start: 2020-03-26 | End: 2020-10-12

## 2020-03-26 RX ORDER — PANTOPRAZOLE SODIUM 40 MG/1
TABLET, DELAYED RELEASE ORAL
Qty: 30 TABLET | Refills: 5 | Status: SHIPPED | OUTPATIENT
Start: 2020-03-26 | End: 2020-10-12

## 2020-08-18 ENCOUNTER — TELEPHONE (OUTPATIENT)
Dept: FAMILY MEDICINE CLINIC | Age: 45
End: 2020-08-18

## 2020-08-18 NOTE — TELEPHONE ENCOUNTER
Phone [phone]    Pt reports that he is having elevated BP still 190/112 today   Pt was in the ER last night and he was told to call and ask about increasing his medication   Please see ED note from [facility].   Pt asking if its ok to wait till Thursday to be seen or if he needs to come in sooner? Pt c/o migraine and feeling run down/not feeling great.

## 2020-08-18 NOTE — TELEPHONE ENCOUNTER
Please tell patient to take lisinopril 30 mg today and every day until his appointment.   Okay to wait until Thursday for his appointment

## 2020-08-20 ENCOUNTER — OFFICE VISIT (OUTPATIENT)
Dept: FAMILY MEDICINE CLINIC | Age: 45
End: 2020-08-20
Payer: MEDICARE

## 2020-08-20 ENCOUNTER — HOSPITAL ENCOUNTER (OUTPATIENT)
Age: 45
Discharge: HOME | End: 2020-08-20
Payer: MEDICARE

## 2020-08-20 ENCOUNTER — HOSPITAL ENCOUNTER (OUTPATIENT)
Age: 45
Setting detail: SPECIMEN
Discharge: HOME | End: 2020-08-20
Payer: MEDICARE

## 2020-08-20 ENCOUNTER — TELEPHONE (OUTPATIENT)
Dept: FAMILY MEDICINE CLINIC | Age: 45
End: 2020-08-20

## 2020-08-20 VITALS
OXYGEN SATURATION: 98 % | WEIGHT: 294 LBS | DIASTOLIC BLOOD PRESSURE: 110 MMHG | TEMPERATURE: 98.1 F | BODY MASS INDEX: 37.73 KG/M2 | SYSTOLIC BLOOD PRESSURE: 164 MMHG | HEART RATE: 76 BPM | HEIGHT: 74 IN

## 2020-08-20 PROBLEM — B18.2 CHRONIC HEPATITIS C WITHOUT HEPATIC COMA (HCC): Status: ACTIVE | Noted: 2020-08-20

## 2020-08-20 PROBLEM — E66.01 MORBIDLY OBESE (HCC): Status: ACTIVE | Noted: 2020-08-20

## 2020-08-20 LAB
ABSOLUTE EOS #: 0.2 K/UL (ref 0–0.4)
ABSOLUTE IMMATURE GRANULOCYTE: ABNORMAL K/UL (ref 0–0.3)
ABSOLUTE LYMPH #: 2.5 K/UL (ref 1–4.8)
ABSOLUTE MONO #: 0.5 K/UL (ref 0–1)
ALBUMIN SERPL-MCNC: 4.4 G/DL (ref 3.5–5.2)
ALBUMIN/GLOBULIN RATIO: ABNORMAL (ref 1–2.5)
ALP BLD-CCNC: 86 U/L (ref 40–129)
ALT SERPL-CCNC: 24 U/L (ref 5–41)
AMPHETAMINE SCREEN URINE: NEGATIVE
ANION GAP SERPL CALCULATED.3IONS-SCNC: 10 MMOL/L (ref 9–17)
AST SERPL-CCNC: 24 U/L
BARBITURATE SCREEN URINE: NEGATIVE
BASOPHILS # BLD: 1 % (ref 0–2)
BASOPHILS ABSOLUTE: 0.1 K/UL (ref 0–0.2)
BENZODIAZEPINE SCREEN, URINE: NEGATIVE
BILIRUB SERPL-MCNC: 1.01 MG/DL (ref 0.3–1.2)
BILIRUBIN, POC: NORMAL
BLOOD URINE, POC: NORMAL
BUN BLDV-MCNC: 15 MG/DL (ref 6–20)
BUN/CREAT BLD: 17 (ref 9–20)
BUPRENORPHINE URINE: ABNORMAL
CALCIUM SERPL-MCNC: 10.5 MG/DL (ref 8.6–10.4)
CANNABINOID SCREEN URINE: POSITIVE
CHLORIDE BLD-SCNC: 100 MMOL/L (ref 98–107)
CHOLESTEROL/HDL RATIO: 4.2
CHOLESTEROL: 92 MG/DL
CLARITY, POC: CLEAR
CO2: 28 MMOL/L (ref 20–31)
COCAINE METABOLITE, URINE: NEGATIVE
COLOR, POC: YELLOW
CREAT SERPL-MCNC: 0.86 MG/DL (ref 0.7–1.2)
DIFFERENTIAL TYPE: YES
EOSINOPHILS RELATIVE PERCENT: 2 % (ref 0–5)
GFR AFRICAN AMERICAN: >60 ML/MIN
GFR NON-AFRICAN AMERICAN: >60 ML/MIN
GFR SERPL CREATININE-BSD FRML MDRD: ABNORMAL ML/MIN/{1.73_M2}
GFR SERPL CREATININE-BSD FRML MDRD: ABNORMAL ML/MIN/{1.73_M2}
GLUCOSE BLD-MCNC: 142 MG/DL (ref 70–99)
GLUCOSE URINE, POC: NORMAL
HCT VFR BLD CALC: 45.1 % (ref 41–53)
HDLC SERPL-MCNC: 22 MG/DL
HEMOGLOBIN: 15.2 G/DL (ref 13.5–17.5)
IMMATURE GRANULOCYTES: ABNORMAL %
KETONES, POC: NORMAL
LDL CHOLESTEROL: 45 MG/DL (ref 0–130)
LEUKOCYTE EST, POC: NORMAL
LYMPHOCYTES # BLD: 24 % (ref 13–44)
MCH RBC QN AUTO: 29.5 PG (ref 26–34)
MCHC RBC AUTO-ENTMCNC: 33.7 G/DL (ref 31–37)
MCV RBC AUTO: 87.3 FL (ref 80–100)
MDMA URINE: ABNORMAL
METHADONE SCREEN, URINE: NEGATIVE
METHAMPHETAMINE, URINE: NEGATIVE
MONOCYTES # BLD: 5 % (ref 5–9)
NITRITE, POC: NORMAL
NRBC AUTOMATED: ABNORMAL PER 100 WBC
OPIATES, URINE: NEGATIVE
OXYCODONE SCREEN URINE: NEGATIVE
PATIENT FASTING?: YES
PDW BLD-RTO: 13.9 % (ref 12.1–15.2)
PH, POC: 7
PHENCYCLIDINE, URINE: NEGATIVE
PLATELET # BLD: 178 K/UL (ref 140–450)
PLATELET ESTIMATE: ABNORMAL
PMV BLD AUTO: ABNORMAL FL (ref 6–12)
POTASSIUM SERPL-SCNC: 4.1 MMOL/L (ref 3.7–5.3)
PROPOXYPHENE, URINE: NEGATIVE
PROTEIN, POC: NORMAL
RBC # BLD: 5.17 M/UL (ref 4.5–5.9)
RBC # BLD: ABNORMAL 10*6/UL
SEG NEUTROPHILS: 68 % (ref 39–75)
SEGMENTED NEUTROPHILS ABSOLUTE COUNT: 7.1 K/UL (ref 2.1–6.5)
SODIUM BLD-SCNC: 138 MMOL/L (ref 135–144)
SPECIFIC GRAVITY, POC: 1.02
TEST INFORMATION: ABNORMAL
TOTAL PROTEIN: 7.9 G/DL (ref 6.4–8.3)
TRICYCLIC ANTIDEPRESSANTS, UR: NEGATIVE
TRIGL SERPL-MCNC: 126 MG/DL
UROBILINOGEN, POC: 0.2
VLDLC SERPL CALC-MCNC: ABNORMAL MG/DL (ref 1–30)
WBC # BLD: 10.4 K/UL (ref 3.5–11)
WBC # BLD: ABNORMAL 10*3/UL

## 2020-08-20 PROCEDURE — 80061 LIPID PANEL: CPT

## 2020-08-20 PROCEDURE — 36415 COLL VENOUS BLD VENIPUNCTURE: CPT

## 2020-08-20 PROCEDURE — 80306 DRUG TEST PRSMV INSTRMNT: CPT

## 2020-08-20 PROCEDURE — 80053 COMPREHEN METABOLIC PANEL: CPT

## 2020-08-20 PROCEDURE — 99214 OFFICE O/P EST MOD 30 MIN: CPT | Performed by: NURSE PRACTITIONER

## 2020-08-20 PROCEDURE — 4004F PT TOBACCO SCREEN RCVD TLK: CPT | Performed by: NURSE PRACTITIONER

## 2020-08-20 PROCEDURE — 85025 COMPLETE CBC W/AUTO DIFF WBC: CPT

## 2020-08-20 PROCEDURE — G8417 CALC BMI ABV UP PARAM F/U: HCPCS | Performed by: NURSE PRACTITIONER

## 2020-08-20 PROCEDURE — 83036 HEMOGLOBIN GLYCOSYLATED A1C: CPT

## 2020-08-20 PROCEDURE — G8427 DOCREV CUR MEDS BY ELIG CLIN: HCPCS | Performed by: NURSE PRACTITIONER

## 2020-08-20 RX ORDER — CLONIDINE HYDROCHLORIDE 0.2 MG/1
0.2 TABLET ORAL 2 TIMES DAILY
Qty: 60 TABLET | Refills: 0 | Status: SHIPPED | OUTPATIENT
Start: 2020-08-20 | End: 2020-09-10 | Stop reason: SDUPTHER

## 2020-08-20 RX ORDER — HYDROCHLOROTHIAZIDE 25 MG/1
25 TABLET ORAL EVERY MORNING
Qty: 90 TABLET | Refills: 1 | Status: SHIPPED | OUTPATIENT
Start: 2020-08-20 | End: 2020-12-10 | Stop reason: SDUPTHER

## 2020-08-20 RX ORDER — LISINOPRIL 40 MG/1
40 TABLET ORAL DAILY
Qty: 30 TABLET | Refills: 0 | Status: SHIPPED | OUTPATIENT
Start: 2020-08-20 | End: 2020-09-10 | Stop reason: SDUPTHER

## 2020-08-20 SDOH — ECONOMIC STABILITY: TRANSPORTATION INSECURITY
IN THE PAST 12 MONTHS, HAS LACK OF TRANSPORTATION KEPT YOU FROM MEETINGS, WORK, OR FROM GETTING THINGS NEEDED FOR DAILY LIVING?: NO

## 2020-08-20 SDOH — ECONOMIC STABILITY: FOOD INSECURITY: WITHIN THE PAST 12 MONTHS, YOU WORRIED THAT YOUR FOOD WOULD RUN OUT BEFORE YOU GOT MONEY TO BUY MORE.: NEVER TRUE

## 2020-08-20 SDOH — ECONOMIC STABILITY: INCOME INSECURITY: HOW HARD IS IT FOR YOU TO PAY FOR THE VERY BASICS LIKE FOOD, HOUSING, MEDICAL CARE, AND HEATING?: NOT HARD AT ALL

## 2020-08-20 SDOH — ECONOMIC STABILITY: FOOD INSECURITY: WITHIN THE PAST 12 MONTHS, THE FOOD YOU BOUGHT JUST DIDN'T LAST AND YOU DIDN'T HAVE MONEY TO GET MORE.: NEVER TRUE

## 2020-08-20 SDOH — ECONOMIC STABILITY: TRANSPORTATION INSECURITY
IN THE PAST 12 MONTHS, HAS THE LACK OF TRANSPORTATION KEPT YOU FROM MEDICAL APPOINTMENTS OR FROM GETTING MEDICATIONS?: NO

## 2020-08-20 ASSESSMENT — PATIENT HEALTH QUESTIONNAIRE - PHQ9
2. FEELING DOWN, DEPRESSED OR HOPELESS: 0
SUM OF ALL RESPONSES TO PHQ QUESTIONS 1-9: 0
SUM OF ALL RESPONSES TO PHQ QUESTIONS 1-9: 0
1. LITTLE INTEREST OR PLEASURE IN DOING THINGS: 0
SUM OF ALL RESPONSES TO PHQ9 QUESTIONS 1 & 2: 0

## 2020-08-20 ASSESSMENT — ENCOUNTER SYMPTOMS
COUGH: 0
VOMITING: 0
NAUSEA: 0
DIARRHEA: 0
SHORTNESS OF BREATH: 0

## 2020-08-20 NOTE — PROGRESS NOTES
HPI Notes    Name: Surekha Agee  : 1975         Chief Complaint:     Chief Complaint   Patient presents with   [facility] ED Follow-up     Patient here today for ED follow up. Was at [facility] on 20, dx with hypertensive urgency and migraine. He did increase his lisinopril to 30mg daily.  Obesity     On BRIAN    Hepatitis C     On BRIAN    Addiction Problem     Hx of , on BRIAN       History of Present Illness:        Hypertension   This is a chronic (pt recently in ED for HTN crisis. had cardiac workup which was negative. Labs good. ) problem. The current episode started more than 1 year ago. The problem has been gradually worsening since onset. The problem is uncontrolled. Pertinent negatives include no chest pain, headaches, palpitations or shortness of breath. Risk factors for coronary artery disease include male gender, obesity and smoking/tobacco exposure. Past treatments include ACE inhibitors. Compliance problems include diet and exercise. Patient has been gaining a lot of weight lately. Patient works as a manager at [employer] and has been eating fast food several times a week. Patient states that he was told he no longer has hepatitis C due to taking medication. Patient states he has been clean from heroin for 3 and half years. Patient states that he stays away from people that he used to use with because he knows that it is a trigger.   Past Medical History:     Past Medical History:   Diagnosis Date    Chicken pox     Chronic back pain     Gout     Headache(784.0)     Hepatitis B 2017    Core antibody IgG/IgM -- positive    Hepatitis C 2017    Viral RNA by PCR -- detected    Hernia     Hypertension     Osteoarthritis       Reviewed all health maintenance requirements and ordered appropriate tests  Health Maintenance Due   Topic Date Due    Hepatitis A vaccine (1 of 2 - Risk 2-dose series) 1976    Hepatitis B vaccine (1 of 3 - Risk 3-dose series) 1994 dizziness, seizures and headaches. Physical Exam:     Vitals:  BP (!) 164/110 (Site: Left Upper Arm, Position: Sitting, Cuff Size: Large Adult)   Pulse 76   Temp 98.1 °F (36.7 °C) (Oral)   Ht 6' 2\" (1.88 m)   Wt 294 lb (133.4 kg)   SpO2 98%   BMI 37.75 kg/m²       Physical Exam  Vitals signs and nursing note reviewed. Constitutional:       Appearance: He is well-developed. Cardiovascular:      Rate and Rhythm: Normal rate and regular rhythm. Heart sounds: S1 normal and S2 normal.   Pulmonary:      Effort: Pulmonary effort is normal. No respiratory distress. Breath sounds: Normal breath sounds. Abdominal:      General: Bowel sounds are normal.      Palpations: Abdomen is soft. Tenderness: There is no abdominal tenderness. Skin:     General: Skin is warm and dry. Psychiatric:         Behavior: Behavior is cooperative. Data:     Lab Results   Component Value Date     08/20/2020    K 4.1 08/20/2020     08/20/2020    CO2 28 08/20/2020    BUN 15 08/20/2020    CREATININE 0.86 08/20/2020    GLUCOSE 142 08/20/2020    GLUCOSE 127 12/26/2011    PROT 7.9 08/20/2020    LABALBU 4.4 08/20/2020    BILITOT 1.01 08/20/2020    ALKPHOS 86 08/20/2020    AST 24 08/20/2020    ALT 24 08/20/2020     Lab Results   Component Value Date    WBC 10.4 08/20/2020    RBC 5.17 08/20/2020    RBC 4.41 12/26/2011    HGB 15.2 08/20/2020    HCT 45.1 08/20/2020    MCV 87.3 08/20/2020    MCH 29.5 08/20/2020    MCHC 33.7 08/20/2020    RDW 13.9 08/20/2020     08/20/2020     12/26/2011    MPV NOT REPORTED 08/20/2020     Lab Results   Component Value Date    TSH 2.82 03/09/2015     Lab Results   Component Value Date    CHOL 233 04/01/2019    HDL 20 04/01/2019    LABA1C 5.7 04/01/2019          Assessment & Plan        Diagnosis Orders   1. Essential hypertension  --Will increase lisinopril to 40 mg. Add clonidine 0.2 mg p.o. twice daily. Add HCTZ 25 mg p.o. daily.   Patient educated about new medications. CBC Auto Differential    Comprehensive Metabolic Panel    Lipid Panel    POCT Urinalysis no Micro    Urine Drug Screen   2. Morbidly obese ([facility]   --Pt educated about regular exercise and calorie reduction. Exercise should involve getting the heart rate elevated and keeping it elevated. Should exercise at least 1 hour 3 x per week. Pt educated about tracking calories and food intake with any calorie tracking chandler. Focus on decreasing portion sizes, eating lean meats, and vegetables. Increase water intake to 64-80oz daily. Lipid Panel   3. Chronic hepatitis C without hepatic coma (HCC)   --zero viral load after treatment    4. History of heroin abuse ([facility]   --pt encouraged to continue clean living. Urine Drug Screen     Patient verbalizes understanding and agreement with plan. All questions answered. If symptoms do not resolve or worsen, return to office. Completed Refills   Requested Prescriptions     Signed Prescriptions Disp Refills    lisinopril (PRINIVIL;ZESTRIL) 40 MG tablet 30 tablet 0     Sig: Take 1 tablet by mouth daily    cloNIDine (CATAPRES) 0.2 MG tablet 60 tablet 0     Sig: Take 1 tablet by mouth 2 times daily    hydroCHLOROthiazide (HYDRODIURIL) 25 MG tablet 90 tablet 1     Sig: Take 1 tablet by mouth every morning     No follow-ups on file.      Orders Placed This Encounter   Medications    lisinopril (PRINIVIL;ZESTRIL) 40 MG tablet     Sig: Take 1 tablet by mouth daily     Dispense:  30 tablet     Refill:  0    cloNIDine (CATAPRES) 0.2 MG tablet     Sig: Take 1 tablet by mouth 2 times daily     Dispense:  60 tablet     Refill:  0    hydroCHLOROthiazide (HYDRODIURIL) 25 MG tablet     Sig: Take 1 tablet by mouth every morning     Dispense:  90 tablet     Refill:  1     Orders Placed This Encounter   Procedures    CBC Auto Differential     Standing Status:   Future     Number of Occurrences:   1     Standing Expiration Date:   9/24/2021    Comprehensive Metabolic Panel     Standing Status:   Future     Number of Occurrences:   1     Standing Expiration Date:   9/24/2021    Lipid Panel     Standing Status:   Future     Number of Occurrences:   1     Standing Expiration Date:   9/24/2021     Order Specific Question:   Is Patient Fasting?/# of Hours     Answer:   15    Urine Drug Screen    POCT Urinalysis no Micro         Patient Instructions   SURVEY:    You may be receiving a survey from [employer] regarding your visit today. Please complete the survey to enable us to provide the highest quality of care to you and your family. If you cannot score us a very good (5 Stars) on any question, please call the office to discuss how we could have made your experience a very good one. Thank you. Clinical Care Team: SHANICE Blandon LPN    Clerical Team: Franny Liz        Electronically signed by J CARLOS Blandon CNP on 8/20/2020 at 4:53 PM           Completed Refills      Requested Prescriptions     Signed Prescriptions Disp Refills    lisinopril (PRINIVIL;ZESTRIL) 40 MG tablet 30 tablet 0     Sig: Take 1 tablet by mouth daily    cloNIDine (CATAPRES) 0.2 MG tablet 60 tablet 0     Sig: Take 1 tablet by mouth 2 times daily    hydroCHLOROthiazide (HYDRODIURIL) 25 MG tablet 90 tablet 1     Sig: Take 1 tablet by mouth every morning         Kel Moss received counseling on the following healthy behaviors: nutrition, exercise and medication adherence  Reviewed prior labs and health maintenance. Continue current medications, diet and exercise. Discussed use, benefit, and side effects of prescribed medications. Barriers to medication compliance addressed. Patient given educational materials - see patient instructions. All patient questions answered. Patient voiced understanding.

## 2020-08-20 NOTE — PATIENT INSTRUCTIONS
SURVEY:    You may be receiving a survey from [employer] regarding your visit today. Please complete the survey to enable us to provide the highest quality of care to you and your family. If you cannot score us a very good (5 Stars) on any question, please call the office to discuss how we could have made your experience a very good one. Thank you.     Clinical Care Team: J CARLOS Lee-DAWSON Cui LPN    Clerical Team: Ene Eagle

## 2020-08-21 LAB
ESTIMATED AVERAGE GLUCOSE: 120 MG/DL
HBA1C MFR BLD: 5.8 % (ref 4–6)

## 2020-09-10 ENCOUNTER — HOSPITAL ENCOUNTER (OUTPATIENT)
Age: 45
Discharge: HOME | End: 2020-09-12
Payer: MEDICARE

## 2020-09-10 ENCOUNTER — OFFICE VISIT (OUTPATIENT)
Dept: FAMILY MEDICINE CLINIC | Age: 45
End: 2020-09-10
Payer: MEDICARE

## 2020-09-10 ENCOUNTER — HOSPITAL ENCOUNTER (OUTPATIENT)
Dept: GENERAL RADIOLOGY | Age: 45
Discharge: HOME | End: 2020-09-12
Payer: MEDICARE

## 2020-09-10 VITALS
TEMPERATURE: 99 F | HEIGHT: 74 IN | OXYGEN SATURATION: 97 % | SYSTOLIC BLOOD PRESSURE: 144 MMHG | WEIGHT: 288 LBS | DIASTOLIC BLOOD PRESSURE: 82 MMHG | BODY MASS INDEX: 36.96 KG/M2 | HEART RATE: 100 BPM

## 2020-09-10 PROCEDURE — 72050 X-RAY EXAM NECK SPINE 4/5VWS: CPT

## 2020-09-10 PROCEDURE — 4004F PT TOBACCO SCREEN RCVD TLK: CPT | Performed by: NURSE PRACTITIONER

## 2020-09-10 PROCEDURE — G8417 CALC BMI ABV UP PARAM F/U: HCPCS | Performed by: NURSE PRACTITIONER

## 2020-09-10 PROCEDURE — 99214 OFFICE O/P EST MOD 30 MIN: CPT | Performed by: NURSE PRACTITIONER

## 2020-09-10 PROCEDURE — G8427 DOCREV CUR MEDS BY ELIG CLIN: HCPCS | Performed by: NURSE PRACTITIONER

## 2020-09-10 RX ORDER — CLONIDINE HYDROCHLORIDE 0.2 MG/1
0.2 TABLET ORAL 2 TIMES DAILY
Qty: 60 TABLET | Refills: 2 | Status: SHIPPED | OUTPATIENT
Start: 2020-09-10 | End: 2020-12-10 | Stop reason: SDUPTHER

## 2020-09-10 RX ORDER — LISINOPRIL 40 MG/1
40 TABLET ORAL DAILY
Qty: 30 TABLET | Refills: 2 | Status: SHIPPED | OUTPATIENT
Start: 2020-09-10 | End: 2020-12-10 | Stop reason: SDUPTHER

## 2020-09-10 RX ORDER — HYDROXYZINE PAMOATE 25 MG/1
25 CAPSULE ORAL 3 TIMES DAILY PRN
Qty: 270 CAPSULE | Refills: 0 | Status: SHIPPED | OUTPATIENT
Start: 2020-09-10 | End: 2020-12-21 | Stop reason: SDUPTHER

## 2020-09-10 ASSESSMENT — ENCOUNTER SYMPTOMS
BACK PAIN: 1
SHORTNESS OF BREATH: 0
NAUSEA: 0
VOMITING: 0
DIARRHEA: 0
COUGH: 0

## 2020-09-10 NOTE — PROGRESS NOTES
HPI Notes    Name: Ramona Mcmillan  : 1975         Chief Complaint:     Chief Complaint   Patient presents with    Hypertension    Circulatory Problem     Hands,feet        History of Present Illness:        Hypertension   This is a chronic problem. The current episode started more than 1 year ago. The problem is controlled. Associated symptoms include peripheral edema. Pertinent negatives include no chest pain, headaches, palpitations or shortness of breath. Risk factors for coronary artery disease include male gender, obesity, smoking/tobacco exposure and stress. Past treatments include ACE inhibitors, diuretics and central alpha agonists. Mental Health Problem   The primary symptoms include dysphoric mood. The current episode started more than 1 month ago. This is a chronic problem. The onset of the illness is precipitated by emotional stress. The degree of incapacity that he is experiencing as a consequence of his illness is mild. Additional symptoms of the illness include hypersomnia and agitation. Additional symptoms of the illness do not include insomnia, headaches or seizures. He does not admit to suicidal ideas. He does not have a plan to attempt suicide. He does not contemplate harming himself. He has not already injured self. He does not contemplate injuring another person. He has not already  injured another person. Risk factors that are present for mental illness include a history of mental illness, a history of suicide attempts and a family history of mental illness (4 attempts of suicide over a 10 year period, no attempts since \"the \"). Pt has taken paxil, prozac, lexapro, and cymbalta in the past. Pt has been on haldol and lithium while in the hospital. Pt does not want any of these medications. Pt has done counseling in the past. Pt states that he feels ok overall just has some anxiety at times. Pt states \"I can't control it. It just comes onto me\".       Complains of transient tingling and radiating pain in his left arm. Pt has chronic neck pain to the left side. Pt states \"it's always hurt\". Pt has to shake hands out to \"wake them up\". Past Medical History:     Past Medical History:   Diagnosis Date    Chicken pox     Chronic back pain     Gout     Headache(784.0)     Hepatitis B 03/30/2017    Core antibody IgG/IgM -- positive    Hepatitis C 03/30/2017    Viral RNA by PCR -- detected    Hernia     Hypertension     Osteoarthritis       Reviewed all health maintenance requirements and ordered appropriate tests  Health Maintenance Due   Topic Date Due    Hepatitis A vaccine (1 of 2 - Risk 2-dose series) 09/14/1976    Hepatitis B vaccine (1 of 3 - Risk 3-dose series) 09/14/1994    Flu vaccine (1) 09/01/2020       Past Surgical History:     No past surgical history on file. Medications:       Prior to Admission medications    Medication Sig Start Date End Date Taking?  Authorizing Provider   cloNIDine (CATAPRES) 0.2 MG tablet Take 1 tablet by mouth 2 times daily 9/10/20  Yes Maryjo Blinks, APRN - CNP   lisinopril (PRINIVIL;ZESTRIL) 40 MG tablet Take 1 tablet by mouth daily 9/10/20  Yes Maryjo Blinks, APRN - CNP   hydrOXYzine (VISTARIL) 25 MG capsule Take 1 capsule by mouth 3 times daily as needed for Itching 9/10/20 12/9/20 Yes Maryjo Blinks, APRN - CNP   hydroCHLOROthiazide (HYDRODIURIL) 25 MG tablet Take 1 tablet by mouth every morning 8/20/20  Yes Maryjo Blinks, APRN - CNP   pantoprazole (PROTONIX) 40 MG tablet TAKE 1 CAPSULE BY MOUTH EVERY DAY 3/26/20  Yes Maryjo Blinks, APRN - CNP   allopurinol (ZYLOPRIM) 300 MG tablet take 1 tablet by mouth once daily 3/26/20  Yes Maryjo Blinks, APRN - CNP   gabapentin (NEURONTIN) 800 MG tablet TAKE 1 TABLET BY MOUTH THREE TIMES DAILY 3/26/20 9/24/20 Yes Maryjo Blinks, APRN - CNP   atorvastatin (LIPITOR) 80 MG tablet TAKE 1 TABLET BY MOUTH DAILY 3/11/20  Yes Maryjo Blinks, APRN - CNP regularly. Will leave medications the same for now. 2. Lower leg edema  --continue HCTZ  VL ERNESTO BILATERAL LIMITED 1-2 LEVELS   3. PAD (peripheral artery disease) ([facility]  --will send for ERNESTO  VL ERNESTO BILATERAL LIMITED 1-2 LEVELS   4. ANSLEY (generalized anxiety disorder)   --pt encouraged to go to counseling. Pt has taken many SSRIs and \"doesn't like them\". Will start on vistaril. 5. Cervical radiculopathy   --cervical xray XR CERVICAL SPINE (4-5 VIEWS)     Patient verbalizes understanding and agreement with plan. All questions answered. If symptoms do not resolve or worsen, return to office. Completed Refills   Requested Prescriptions     Signed Prescriptions Disp Refills    cloNIDine (CATAPRES) 0.2 MG tablet 60 tablet 2     Sig: Take 1 tablet by mouth 2 times daily    lisinopril (PRINIVIL;ZESTRIL) 40 MG tablet 30 tablet 2     Sig: Take 1 tablet by mouth daily    hydrOXYzine (VISTARIL) 25 MG capsule 270 capsule 0     Sig: Take 1 capsule by mouth 3 times daily as needed for Itching     No follow-ups on file.      Orders Placed This Encounter   Medications    cloNIDine (CATAPRES) 0.2 MG tablet     Sig: Take 1 tablet by mouth 2 times daily     Dispense:  60 tablet     Refill:  2    lisinopril (PRINIVIL;ZESTRIL) 40 MG tablet     Sig: Take 1 tablet by mouth daily     Dispense:  30 tablet     Refill:  2    hydrOXYzine (VISTARIL) 25 MG capsule     Sig: Take 1 capsule by mouth 3 times daily as needed for Itching     Dispense:  270 capsule     Refill:  0     Orders Placed This Encounter   Procedures    VL ERNESTO BILATERAL LIMITED 1-2 LEVELS     Standing Status:   Future     Standing Expiration Date:   9/10/2021     Order Specific Question:   Reason for exam:     Answer:   small non-healing wound right posterior leg, HTN, lower leg edema    XR CERVICAL SPINE (4-5 VIEWS)     Standing Status:   Future     Number of Occurrences:   1     Standing Expiration Date:   9/10/2021     Order Specific Question: Reason for exam:     Answer:   left arm tingling         Patient Instructions     SURVEY:    You may be receiving a survey from [email or web address] regarding your visit today. Please complete the survey to enable us to provide the highest quality of care to you and your family. If you cannot score us a very good on any question, please call the office to discuss how we could of made your experience a very good one. Thank you. Electronically signed by J CARLOS Gillespie CNP on 9/10/2020 at 4:57 PM           Completed Refills      Requested Prescriptions     Signed Prescriptions Disp Refills    cloNIDine (CATAPRES) 0.2 MG tablet 60 tablet 2     Sig: Take 1 tablet by mouth 2 times daily    lisinopril (PRINIVIL;ZESTRIL) 40 MG tablet 30 tablet 2     Sig: Take 1 tablet by mouth daily    hydrOXYzine (VISTARIL) 25 MG capsule 270 capsule 0     Sig: Take 1 capsule by mouth 3 times daily as needed for Itching         Ajo received counseling on the following healthy behaviors: nutrition, exercise and medication adherence  Reviewed prior labs and health maintenance. Continue current medications, diet and exercise. Discussed use, benefit, and side effects of prescribed medications. Barriers to medication compliance addressed. Patient given educational materials - see patient instructions. All patient questions answered. Patient voiced understanding.

## 2020-09-11 ENCOUNTER — TELEPHONE (OUTPATIENT)
Dept: FAMILY MEDICINE CLINIC | Age: 45
End: 2020-09-11

## 2020-09-11 RX ORDER — TIZANIDINE 4 MG/1
4 TABLET ORAL NIGHTLY PRN
Qty: 30 TABLET | Refills: 1 | Status: SHIPPED | OUTPATIENT
Start: 2020-09-11 | End: 2020-12-10 | Stop reason: SDUPTHER

## 2020-09-11 NOTE — TELEPHONE ENCOUNTER
----- Message from J CARLOS Figueroa CNP sent at 9/11/2020  2:17 PM EDT -----  Please let patient know that there were some degenerative changes in C5 and C6 and some left foraminal narrowing at C3 and C4. None of this is significantly bad, however are just mild age-related changes. It did also say that there was likely some soft tissue spasms in the neck. I can try him on a muscle relaxer to help relax his neck a little bit if he would like to.   If he is agreeable it would be tizanidine 4 mg p.o. nightly

## 2020-09-11 NOTE — TELEPHONE ENCOUNTER
Patient called back and results reviewed  He would like to try muscle relaxer  Rx pending for tizanidine

## 2020-09-15 ENCOUNTER — HOSPITAL ENCOUNTER (OUTPATIENT)
Dept: VASCULAR LAB | Age: 45
Discharge: HOME | End: 2020-09-17
Payer: MEDICARE

## 2020-09-15 PROCEDURE — 93922 UPR/L XTREMITY ART 2 LEVELS: CPT

## 2020-10-12 RX ORDER — PANTOPRAZOLE SODIUM 40 MG/1
TABLET, DELAYED RELEASE ORAL
Qty: 30 TABLET | Refills: 5 | Status: SHIPPED | OUTPATIENT
Start: 2020-10-12 | End: 2021-04-22 | Stop reason: SDUPTHER

## 2020-10-12 RX ORDER — ALLOPURINOL 300 MG/1
TABLET ORAL
Qty: 30 TABLET | Refills: 5 | Status: SHIPPED | OUTPATIENT
Start: 2020-10-12 | End: 2021-04-22 | Stop reason: SDUPTHER

## 2020-10-12 RX ORDER — GABAPENTIN 800 MG/1
TABLET ORAL
Qty: 90 TABLET | Refills: 5 | Status: SHIPPED | OUTPATIENT
Start: 2020-10-12 | End: 2021-04-22 | Stop reason: SDUPTHER

## 2020-12-10 ENCOUNTER — OFFICE VISIT (OUTPATIENT)
Dept: FAMILY MEDICINE CLINIC | Age: 45
End: 2020-12-10
Payer: MEDICARE

## 2020-12-10 VITALS
DIASTOLIC BLOOD PRESSURE: 80 MMHG | WEIGHT: 294 LBS | TEMPERATURE: 99 F | OXYGEN SATURATION: 97 % | HEART RATE: 114 BPM | BODY MASS INDEX: 37.75 KG/M2 | SYSTOLIC BLOOD PRESSURE: 124 MMHG

## 2020-12-10 PROCEDURE — 99214 OFFICE O/P EST MOD 30 MIN: CPT | Performed by: NURSE PRACTITIONER

## 2020-12-10 PROCEDURE — G8427 DOCREV CUR MEDS BY ELIG CLIN: HCPCS | Performed by: NURSE PRACTITIONER

## 2020-12-10 PROCEDURE — G8484 FLU IMMUNIZE NO ADMIN: HCPCS | Performed by: NURSE PRACTITIONER

## 2020-12-10 PROCEDURE — G8417 CALC BMI ABV UP PARAM F/U: HCPCS | Performed by: NURSE PRACTITIONER

## 2020-12-10 PROCEDURE — 4004F PT TOBACCO SCREEN RCVD TLK: CPT | Performed by: NURSE PRACTITIONER

## 2020-12-10 RX ORDER — LISINOPRIL 40 MG/1
40 TABLET ORAL DAILY
Qty: 90 TABLET | Refills: 1 | Status: SHIPPED | OUTPATIENT
Start: 2020-12-10 | End: 2021-06-10 | Stop reason: SDUPTHER

## 2020-12-10 RX ORDER — HYDROCHLOROTHIAZIDE 25 MG/1
25 TABLET ORAL EVERY MORNING
Qty: 90 TABLET | Refills: 1 | Status: SHIPPED | OUTPATIENT
Start: 2020-12-10 | End: 2021-08-16 | Stop reason: SDUPTHER

## 2020-12-10 RX ORDER — CLONIDINE HYDROCHLORIDE 0.2 MG/1
0.2 TABLET ORAL 2 TIMES DAILY
Qty: 180 TABLET | Refills: 1 | Status: SHIPPED | OUTPATIENT
Start: 2020-12-10 | End: 2021-06-10 | Stop reason: SDUPTHER

## 2020-12-10 RX ORDER — ATORVASTATIN CALCIUM 80 MG/1
80 TABLET, FILM COATED ORAL DAILY
Qty: 90 TABLET | Refills: 1 | Status: SHIPPED | OUTPATIENT
Start: 2020-12-10 | End: 2021-06-10 | Stop reason: SDUPTHER

## 2020-12-10 RX ORDER — TIZANIDINE 4 MG/1
4 TABLET ORAL NIGHTLY PRN
Qty: 90 TABLET | Refills: 1 | Status: SHIPPED | OUTPATIENT
Start: 2020-12-10 | End: 2021-06-14 | Stop reason: SDUPTHER

## 2020-12-10 ASSESSMENT — ENCOUNTER SYMPTOMS
DIARRHEA: 0
COUGH: 0
SHORTNESS OF BREATH: 0
NAUSEA: 0
VOMITING: 0

## 2020-12-10 NOTE — PROGRESS NOTES
HPI Notes    Name: Balbir Max  : 1975         Chief Complaint:     Chief Complaint   Patient presents with    Hypertension     patient is here for 3 month follow up . He is currently taking HCTZ and Lisinopril. Patient has been out of Clonidine for about a month.  Neck Pain     Need refill of his zanaflex. History of Present Illness:        Hypertension   This is a chronic problem. The current episode started more than 1 year ago. The problem is controlled. Associated symptoms include neck pain. Pertinent negatives include no chest pain, headaches, palpitations, peripheral edema or shortness of breath. Risk factors for coronary artery disease include dyslipidemia, male gender, obesity, sedentary lifestyle and smoking/tobacco exposure. Past treatments include diuretics, central alpha agonists and ACE inhibitors. The current treatment provides moderate improvement. Compliance problems include diet and exercise. Neck Pain    This is a chronic problem. The current episode started more than 1 year ago. The problem occurs daily. The pain is associated with nothing. The quality of the pain is described as aching. The pain is moderate. The symptoms are aggravated by twisting and position. Stiffness is present all day. Pertinent negatives include no chest pain, fever or headaches. He has tried muscle relaxants for the symptoms. The treatment provided moderate relief. Hyperlipidemia   This is a chronic problem. The problem is controlled. Recent lipid tests were reviewed and are normal. Exacerbating diseases include obesity. Factors aggravating his hyperlipidemia include fatty foods and smoking. Pertinent negatives include no chest pain or shortness of breath. Current antihyperlipidemic treatment includes statins. The current treatment provides moderate improvement of lipids. Compliance problems include adherence to diet and adherence to exercise.   Risk factors for coronary artery disease include Abdomen is soft. Tenderness: There is no abdominal tenderness. Musculoskeletal: Normal range of motion. Skin:     General: Skin is warm and dry. Findings: No rash. Neurological:      Mental Status: He is alert and oriented to person, place, and time. GCS: GCS eye subscore is 4. GCS verbal subscore is 5. GCS motor subscore is 6. Deep Tendon Reflexes: Reflexes are normal and symmetric. Psychiatric:         Speech: Speech normal.         Behavior: Behavior normal. Behavior is cooperative. Thought Content: Thought content normal.         Judgment: Judgment normal.               Data:     Lab Results   Component Value Date     08/20/2020    K 4.1 08/20/2020     08/20/2020    CO2 28 08/20/2020    BUN 15 08/20/2020    CREATININE 0.86 08/20/2020    GLUCOSE 142 08/20/2020    GLUCOSE 127 12/26/2011    PROT 7.9 08/20/2020    LABALBU 4.4 08/20/2020    BILITOT 1.01 08/20/2020    ALKPHOS 86 08/20/2020    AST 24 08/20/2020    ALT 24 08/20/2020     Lab Results   Component Value Date    WBC 10.4 08/20/2020    RBC 5.17 08/20/2020    RBC 4.41 12/26/2011    HGB 15.2 08/20/2020    HCT 45.1 08/20/2020    MCV 87.3 08/20/2020    MCH 29.5 08/20/2020    MCHC 33.7 08/20/2020    RDW 13.9 08/20/2020     08/20/2020     12/26/2011    MPV NOT REPORTED 08/20/2020     Lab Results   Component Value Date    TSH 2.82 03/09/2015     Lab Results   Component Value Date    CHOL 92 08/20/2020    HDL 22 08/20/2020    LABA1C 5.8 08/20/2020          Assessment & Plan        Diagnosis Orders   1. Essential hypertension   --BP well controlled at this time. Pt reports getting high BP in the evening. Will continue clonidine BID. Continue other BP meds also. 2. Cervical radiculopathy  --symptoms controlled with tizanidine. Pt able to work and manage pain. 3. Mixed hyperlipidemia  --tolerating statin well. Continue to modify diet. Patient verbalizes understanding and agreement with plan. All questions answered. If symptoms do not resolve or worsen, return to office. Completed Refills   Requested Prescriptions     Signed Prescriptions Disp Refills    cloNIDine (CATAPRES) 0.2 MG tablet 180 tablet 1     Sig: Take 1 tablet by mouth 2 times daily    tiZANidine (ZANAFLEX) 4 MG tablet 90 tablet 1     Sig: Take 1 tablet by mouth nightly as needed (neck pain/ muscle spasms)    lisinopril (PRINIVIL;ZESTRIL) 40 MG tablet 90 tablet 1     Sig: Take 1 tablet by mouth daily    hydroCHLOROthiazide (HYDRODIURIL) 25 MG tablet 90 tablet 1     Sig: Take 1 tablet by mouth every morning    atorvastatin (LIPITOR) 80 MG tablet 90 tablet 1     Sig: Take 1 tablet by mouth daily     No follow-ups on file. Orders Placed This Encounter   Medications    cloNIDine (CATAPRES) 0.2 MG tablet     Sig: Take 1 tablet by mouth 2 times daily     Dispense:  180 tablet     Refill:  1    tiZANidine (ZANAFLEX) 4 MG tablet     Sig: Take 1 tablet by mouth nightly as needed (neck pain/ muscle spasms)     Dispense:  90 tablet     Refill:  1    lisinopril (PRINIVIL;ZESTRIL) 40 MG tablet     Sig: Take 1 tablet by mouth daily     Dispense:  90 tablet     Refill:  1    hydroCHLOROthiazide (HYDRODIURIL) 25 MG tablet     Sig: Take 1 tablet by mouth every morning     Dispense:  90 tablet     Refill:  1    atorvastatin (LIPITOR) 80 MG tablet     Sig: Take 1 tablet by mouth daily     Dispense:  90 tablet     Refill:  1     No orders of the defined types were placed in this encounter. Patient Instructions     SURVEY:    You may be receiving a survey from [employer] regarding your visit today. Please complete the survey to enable us to provide the highest quality of care to you and your family. If you cannot score us a very good on any question, please call the office to discuss how we could of made your experience a very good one. Thank you.         Electronically signed by J CARLOS Harvey CNP on 12/10/2020 at 2:01 PM           Completed Refills      Requested Prescriptions     Signed Prescriptions Disp Refills    cloNIDine (CATAPRES) 0.2 MG tablet 180 tablet 1     Sig: Take 1 tablet by mouth 2 times daily    tiZANidine (ZANAFLEX) 4 MG tablet 90 tablet 1     Sig: Take 1 tablet by mouth nightly as needed (neck pain/ muscle spasms)    lisinopril (PRINIVIL;ZESTRIL) 40 MG tablet 90 tablet 1     Sig: Take 1 tablet by mouth daily    hydroCHLOROthiazide (HYDRODIURIL) 25 MG tablet 90 tablet 1     Sig: Take 1 tablet by mouth every morning    atorvastatin (LIPITOR) 80 MG tablet 90 tablet 1     Sig: Take 1 tablet by mouth daily         Edilia Alfonso received counseling on the following healthy behaviors: nutrition, exercise, medication adherence and tobacco cessation  Reviewed prior labs and health maintenance. Continue current medications, diet and exercise. Discussed use, benefit, and side effects of prescribed medications. Barriers to medication compliance addressed. Patient given educational materials - see patient instructions. All patient questions answered. Patient voiced understanding.

## 2020-12-21 RX ORDER — HYDROXYZINE PAMOATE 25 MG/1
25 CAPSULE ORAL 3 TIMES DAILY PRN
Qty: 270 CAPSULE | Refills: 0 | Status: SHIPPED | OUTPATIENT
Start: 2020-12-21 | End: 2021-03-25 | Stop reason: SDUPTHER

## 2020-12-21 NOTE — TELEPHONE ENCOUNTER
Patient asking for a new script for Hydroxyzine - would like to get refills on this medication also - patient uses [facility] in [facility] Maintenance   Topic Date Due    Hepatitis A vaccine (1 of 2 - Risk 2-dose series) 09/14/1976    Pneumococcal 0-64 years Vaccine (1 of 1 - PPSV23) 09/14/1981    Hepatitis B vaccine (1 of 3 - Risk 3-dose series) 09/14/1994    Flu vaccine (1) 09/01/2020    A1C test (Diabetic or Prediabetic)  08/20/2021    Lipid screen  08/20/2021    Potassium monitoring  08/20/2021    Creatinine monitoring  08/20/2021    DTaP/Tdap/Td vaccine (2 - Td) 07/31/2026    HIV screen  Completed    Hib vaccine  Aged Out    Meningococcal (ACWY) vaccine  Aged Out             (applicable per patient's age: Cancer Screenings, Depression Screening, Fall Risk Screening, Immunizations)    Hemoglobin A1C (%)   Date Value   08/20/2020 5.8   04/01/2019 5.7     LDL Cholesterol (mg/dL)   Date Value   08/20/2020 45     AST (U/L)   Date Value   08/20/2020 24     ALT (U/L)   Date Value   08/20/2020 24     BUN (mg/dL)   Date Value   08/20/2020 15      (goal A1C is < 7)   (goal LDL is <100) need 30-50% reduction from baseline     BP Readings from Last 3 Encounters:   12/10/20 124/80   09/10/20 (!) 144/82   08/20/20 (!) 164/110    (goal /80)      All Future Testing planned in [facility]:      Next Visit Date:  Future Appointments   Date Time Provider Debbie Rosales   5/24/2021  1:00 PM J CARLOS Barton - CNP [facility]            Patient Active Problem List:     HTN (hypertension)     Arthritis     Gout     Mass of right wrist     Gastroesophageal reflux disease without esophagitis     History of heroin abuse ([facility]     Chronic hepatitis C without hepatic coma ([facility]     Morbidly obese ([facility]

## 2021-01-15 ENCOUNTER — OFFICE VISIT (OUTPATIENT)
Dept: FAMILY MEDICINE CLINIC | Age: 46
End: 2021-01-15
Payer: MEDICARE

## 2021-01-15 ENCOUNTER — HOSPITAL ENCOUNTER (OUTPATIENT)
Dept: PREADMISSION TESTING | Age: 46
Setting detail: SPECIMEN
Discharge: HOME | End: 2021-01-15
Payer: MEDICARE

## 2021-01-15 VITALS
OXYGEN SATURATION: 98 % | BODY MASS INDEX: 35.82 KG/M2 | WEIGHT: 279 LBS | DIASTOLIC BLOOD PRESSURE: 68 MMHG | HEART RATE: 110 BPM | SYSTOLIC BLOOD PRESSURE: 110 MMHG | TEMPERATURE: 98.1 F

## 2021-01-15 DIAGNOSIS — Z20.822 SUSPECTED COVID-19 VIRUS INFECTION: Primary | ICD-10-CM

## 2021-01-15 DIAGNOSIS — J06.9 VIRAL URI: ICD-10-CM

## 2021-01-15 LAB
SARS-COV-2, RAPID: NOT DETECTED
SARS-COV-2: NORMAL
SARS-COV-2: NORMAL
SOURCE: NORMAL

## 2021-01-15 PROCEDURE — C9803 HOPD COVID-19 SPEC COLLECT: HCPCS

## 2021-01-15 PROCEDURE — G8417 CALC BMI ABV UP PARAM F/U: HCPCS | Performed by: NURSE PRACTITIONER

## 2021-01-15 PROCEDURE — G8484 FLU IMMUNIZE NO ADMIN: HCPCS | Performed by: NURSE PRACTITIONER

## 2021-01-15 PROCEDURE — G8427 DOCREV CUR MEDS BY ELIG CLIN: HCPCS | Performed by: NURSE PRACTITIONER

## 2021-01-15 PROCEDURE — U0002 COVID-19 LAB TEST NON-CDC: HCPCS

## 2021-01-15 PROCEDURE — 4004F PT TOBACCO SCREEN RCVD TLK: CPT | Performed by: NURSE PRACTITIONER

## 2021-01-15 PROCEDURE — 99213 OFFICE O/P EST LOW 20 MIN: CPT | Performed by: NURSE PRACTITIONER

## 2021-01-15 ASSESSMENT — PATIENT HEALTH QUESTIONNAIRE - PHQ9
SUM OF ALL RESPONSES TO PHQ QUESTIONS 1-9: 0
2. FEELING DOWN, DEPRESSED OR HOPELESS: 0
SUM OF ALL RESPONSES TO PHQ9 QUESTIONS 1 & 2: 0
1. LITTLE INTEREST OR PLEASURE IN DOING THINGS: 0

## 2021-01-15 ASSESSMENT — ENCOUNTER SYMPTOMS
NAUSEA: 1
BLURRED VISION: 1
DIARRHEA: 0
VOMITING: 0
COUGH: 1
SHORTNESS OF BREATH: 0
SINUS PAIN: 1

## 2021-01-15 NOTE — PATIENT INSTRUCTIONS
SURVEY:    You may be receiving a survey from [employer] regarding your visit today. Please complete the survey to enable us to provide the highest quality of care to you and your family. If you cannot score us a very good on any question, please call the office to discuss how we could of made your experience a very good one. Thank you.

## 2021-01-15 NOTE — LETTER
[facility] ANGELIKA Baird [address] [place] [id number]  Phone: [phone]  Fax: Laury Livingston 1321, APRN - CNP        January 15, 2021     Patient: Prabhu Fletcher   YOB: 1975   Date of Visit: 1/15/2021       To Whom It May Concern: It is my medical opinion that Justa Krishnan should remain out of work until further testing is completed. If you have any questions or concerns, please don't hesitate to call.     Sincerely,          Ta Bates, J CARLOS - CNP

## 2021-01-15 NOTE — LETTER
[facility] ANGELIKA  [address] [place] [id number]  Phone: [phone]  Fax: [address], J CARLOS - CNP        January 15, 2021    [address] [place] [address]      Dear Homer Trujillo:    Patient has been having symptoms of a viral URI. No need for antibiotics. Coricidin HBP Cold and Cough 1 tab every 6 hours as needed (nasal decongestant and antihistamine)  Flonase 1 spray each nostril twice daily (nasal steroid)  Ibuprofen 3 times a day (antiinflammatory)   Warm tea with 1tbsp honey (soothes the throat)  Increase water intake  Rest      If you have any questions or concerns, please don't hesitate to call.     Sincerely,          Halina Hatchet, APRN - CNP

## 2021-01-15 NOTE — PROGRESS NOTES
HPI Notes    Name: Lisa Rodriguez  : 1975         Chief Complaint:     Chief Complaint   Patient presents with    Blurred Vision     Patient C/O of symptoms for about 2 days. He thought he was dehydrated but it seems to keep getting worse.  Chills    Generalized Body Aches    Nausea    Cough       History of Present Illness:        Eye Problem   Both eyes are affected. This is a new problem. The current episode started in the past 7 days. The problem has been gradually worsening. There was no injury mechanism. There is no known exposure to pink eye. He does not wear contacts. Associated symptoms include blurred vision and nausea. Pertinent negatives include no fever or vomiting. URI   This is a new problem. The current episode started in the past 7 days. The problem has been gradually worsening. There has been no fever. Associated symptoms include congestion, coughing (dry), headaches, nausea and sinus pain. Pertinent negatives include no chest pain, diarrhea or vomiting. He has tried nothing for the symptoms. Past Medical History:     Past Medical History:   Diagnosis Date    Chicken pox     Chronic back pain     Gout     Headache(784.0)     Hepatitis B 2017    Core antibody IgG/IgM -- positive    Hepatitis C 2017    Viral RNA by PCR -- detected    Hernia     Hypertension     Osteoarthritis       Reviewed all health maintenance requirements and ordered appropriate tests  Health Maintenance Due   Topic Date Due    Hepatitis A vaccine (1 of 2 - Risk 2-dose series) 1976    Pneumococcal 0-64 years Vaccine (1 of 1 - PPSV23) 1981    Hepatitis B vaccine (1 of 3 - Risk 3-dose series) 1994    Flu vaccine (1) 2020       Past Surgical History:     No past surgical history on file. Medications:       Prior to Admission medications    Medication Sig Start Date End Date Taking?  Authorizing Provider Gastrointestinal: Positive for nausea. Negative for diarrhea and vomiting. Neurological: Positive for headaches. Negative for dizziness and seizures. Physical Exam:     Vitals:  /68   Pulse 110   Temp 98.1 °F (36.7 °C) (Oral)   Wt 279 lb (126.6 kg)   SpO2 98%   BMI 35.82 kg/m²       Physical Exam  Vitals signs and nursing note reviewed. Constitutional:       Appearance: He is well-developed. HENT:      Right Ear: Tympanic membrane normal.      Left Ear: Tympanic membrane normal.      Nose: Mucosal edema present. Mouth/Throat:      Pharynx: Posterior oropharyngeal erythema present. Cardiovascular:      Rate and Rhythm: Normal rate and regular rhythm. Heart sounds: Normal heart sounds. Pulmonary:      Effort: Pulmonary effort is normal. No respiratory distress. Breath sounds: Normal breath sounds. Neurological:      Mental Status: He is alert. Psychiatric:         Behavior: Behavior is cooperative.                Data:     Lab Results   Component Value Date     08/20/2020    K 4.1 08/20/2020     08/20/2020    CO2 28 08/20/2020    BUN 15 08/20/2020    CREATININE 0.86 08/20/2020    GLUCOSE 142 08/20/2020    GLUCOSE 127 12/26/2011    PROT 7.9 08/20/2020    LABALBU 4.4 08/20/2020    BILITOT 1.01 08/20/2020    ALKPHOS 86 08/20/2020    AST 24 08/20/2020    ALT 24 08/20/2020     Lab Results   Component Value Date    WBC 10.4 08/20/2020    RBC 5.17 08/20/2020    RBC 4.41 12/26/2011    HGB 15.2 08/20/2020    HCT 45.1 08/20/2020    MCV 87.3 08/20/2020    MCH 29.5 08/20/2020    MCHC 33.7 08/20/2020    RDW 13.9 08/20/2020     08/20/2020     12/26/2011    MPV NOT REPORTED 08/20/2020     Lab Results   Component Value Date    TSH 2.82 03/09/2015     Lab Results   Component Value Date    CHOL 92 08/20/2020    HDL 22 08/20/2020    LABA1C 5.8 08/20/2020          Assessment & Plan        Diagnosis Orders 1. Suspected COVID-19 virus infection  COVID-19 Ambulatory   2. Viral URI  COVID-19 Ambulatory     Suspected COVID. Will send pt for testing. Recommend continued home quarantine with rest, good oral hydration and Tylenol and/or Motrin for body aches and fever. Patient verbalizes understanding and agreement with plan. All questions answered. If symptoms do not resolve or worsen, return to office. Completed Refills   Requested Prescriptions      No prescriptions requested or ordered in this encounter     No follow-ups on file. No orders of the defined types were placed in this encounter. Orders Placed This Encounter   Procedures    COVID-19 Ambulatory     Standing Status:   Future     Standing Expiration Date:   1/15/2022     Scheduling Instructions:      Saline media preferred given current shortage of viral transport media but both acceptable     Order Specific Question:   Is this test for diagnosis or screening? Answer:   Diagnosis of ill patient     Order Specific Question:   Symptomatic for COVID-19 as defined by CDC? Answer:   Yes     Order Specific Question:   Date of Symptom Onset     Answer:   1/14/2021     Order Specific Question:   Hospitalized for COVID-19? Answer:   No     Order Specific Question:   Admitted to ICU for COVID-19? Answer:   No     Order Specific Question:   Employed in healthcare setting? Answer:   No     Order Specific Question:   Resident in a congregate (group) care setting? Answer:   No     Order Specific Question:   Pregnant: Answer:   No     Order Specific Question:   Previously tested for COVID-19? Answer:   No         Patient Instructions     SURVEY:    You may be receiving a survey from [employer] regarding your visit today. Please complete the survey to enable us to provide the highest quality of care to you and your family. If you cannot score us a very good on any question, please call the office to discuss how we could of made your experience a very good one. Thank you. Electronically signed by J CARLOS Phelps CNP on 1/15/2021 at 5:10 PM           Completed Refills      Requested Prescriptions      No prescriptions requested or ordered in this encounter         Elinor Jennings received counseling on the following healthy behaviors: nutrition and medication adherence  Reviewed prior labs and health maintenance. Continue current medications, diet and exercise. Discussed use, benefit, and side effects of prescribed medications. Barriers to medication compliance addressed. Patient given educational materials - see patient instructions. All patient questions answered. Patient voiced understanding.

## 2021-01-19 ENCOUNTER — TELEPHONE (OUTPATIENT)
Dept: FAMILY MEDICINE CLINIC | Age: 46
End: 2021-01-19

## 2021-01-19 NOTE — TELEPHONE ENCOUNTER
Patient went to eye doctor on 1/19/21 and eye test was fine. Patient is wanting to know if he should have his labs repeated? See note from Dr. Julia Dickson, OD. Last last were 8/20/20.     Health Maintenance   Topic Date Due    Hepatitis A vaccine (1 of 2 - Risk 2-dose series) 09/14/1976    Pneumococcal 0-64 years Vaccine (1 of 1 - PPSV23) 09/14/1981    Hepatitis B vaccine (1 of 3 - Risk 3-dose series) 09/14/1994    Flu vaccine (1) 09/01/2020    A1C test (Diabetic or Prediabetic)  08/20/2021    Lipid screen  08/20/2021    Potassium monitoring  08/20/2021    Creatinine monitoring  08/20/2021    DTaP/Tdap/Td vaccine (2 - Td) 07/31/2026    HIV screen  Completed    Hib vaccine  Aged Out    Meningococcal (ACWY) vaccine  Aged Out             (applicable per patient's age: Cancer Screenings, Depression Screening, Fall Risk Screening, Immunizations)    Hemoglobin A1C (%)   Date Value   08/20/2020 5.8   04/01/2019 5.7     LDL Cholesterol (mg/dL)   Date Value   08/20/2020 45     AST (U/L)   Date Value   08/20/2020 24     ALT (U/L)   Date Value   08/20/2020 24     BUN (mg/dL)   Date Value   08/20/2020 15      (goal A1C is < 7)   (goal LDL is <100) need 30-50% reduction from baseline     BP Readings from Last 3 Encounters:   01/15/21 110/68   12/10/20 124/80   09/10/20 (!) 144/82    (goal /80)      All Future Testing planned in [facility]:  Lab Frequency Next Occurrence   COVID-19 Ambulatory Once 01/15/2021       Next Visit Date:  Future Appointments   Date Time Provider Debbie Rosales   5/24/2021  1:00 PM J CARLOS Schmidt - DAWSON Irby [facility]            Patient Active Problem List:     HTN (hypertension)     Arthritis     Gout     Mass of right wrist     Gastroesophageal reflux disease without esophagitis     History of heroin abuse ([address]     Chronic hepatitis C without hepatic coma ([address]     Morbidly obese ([address]

## 2021-01-20 ENCOUNTER — NURSE ONLY (OUTPATIENT)
Dept: FAMILY MEDICINE CLINIC | Age: 46
End: 2021-01-20
Payer: MEDICARE

## 2021-01-20 DIAGNOSIS — H53.8 BLURRED VISION: ICD-10-CM

## 2021-01-20 DIAGNOSIS — R73.01 IMPAIRED FASTING GLUCOSE: Primary | ICD-10-CM

## 2021-01-20 LAB — HBA1C MFR BLD: 11.6 %

## 2021-01-20 PROCEDURE — 83036 HEMOGLOBIN GLYCOSYLATED A1C: CPT | Performed by: NURSE PRACTITIONER

## 2021-01-21 ENCOUNTER — OFFICE VISIT (OUTPATIENT)
Dept: FAMILY MEDICINE CLINIC | Age: 46
End: 2021-01-21
Payer: MEDICARE

## 2021-01-21 VITALS
WEIGHT: 280 LBS | SYSTOLIC BLOOD PRESSURE: 120 MMHG | HEART RATE: 128 BPM | DIASTOLIC BLOOD PRESSURE: 70 MMHG | TEMPERATURE: 98.1 F | OXYGEN SATURATION: 98 % | BODY MASS INDEX: 35.95 KG/M2

## 2021-01-21 DIAGNOSIS — E11.42 TYPE 2 DIABETES MELLITUS WITH DIABETIC POLYNEUROPATHY, WITHOUT LONG-TERM CURRENT USE OF INSULIN (HCC): Primary | ICD-10-CM

## 2021-01-21 LAB
CREATININE URINE POCT: 100
MICROALBUMIN/CREAT 24H UR: 30 MG/G{CREAT}
MICROALBUMIN/CREAT UR-RTO: <30

## 2021-01-21 PROCEDURE — G8417 CALC BMI ABV UP PARAM F/U: HCPCS | Performed by: NURSE PRACTITIONER

## 2021-01-21 PROCEDURE — 4004F PT TOBACCO SCREEN RCVD TLK: CPT | Performed by: NURSE PRACTITIONER

## 2021-01-21 PROCEDURE — 2022F DILAT RTA XM EVC RTNOPTHY: CPT | Performed by: NURSE PRACTITIONER

## 2021-01-21 PROCEDURE — G8427 DOCREV CUR MEDS BY ELIG CLIN: HCPCS | Performed by: NURSE PRACTITIONER

## 2021-01-21 PROCEDURE — 3046F HEMOGLOBIN A1C LEVEL >9.0%: CPT | Performed by: NURSE PRACTITIONER

## 2021-01-21 PROCEDURE — G8484 FLU IMMUNIZE NO ADMIN: HCPCS | Performed by: NURSE PRACTITIONER

## 2021-01-21 PROCEDURE — 82044 UR ALBUMIN SEMIQUANTITATIVE: CPT | Performed by: NURSE PRACTITIONER

## 2021-01-21 PROCEDURE — 99214 OFFICE O/P EST MOD 30 MIN: CPT | Performed by: NURSE PRACTITIONER

## 2021-01-21 RX ORDER — METFORMIN HYDROCHLORIDE 500 MG/1
500 TABLET, EXTENDED RELEASE ORAL
Qty: 30 TABLET | Refills: 2 | Status: SHIPPED | OUTPATIENT
Start: 2021-01-21 | End: 2021-04-22 | Stop reason: SDUPTHER

## 2021-01-21 RX ORDER — LANCETS 30 GAUGE
EACH MISCELLANEOUS
Qty: 100 EACH | Refills: 3 | Status: SHIPPED | OUTPATIENT
Start: 2021-01-21 | End: 2021-07-29 | Stop reason: SDUPTHER

## 2021-01-21 RX ORDER — GLIMEPIRIDE 2 MG/1
2 TABLET ORAL
Qty: 30 TABLET | Refills: 2 | Status: SHIPPED | OUTPATIENT
Start: 2021-01-21 | End: 2021-04-22 | Stop reason: ALTCHOICE

## 2021-01-21 RX ORDER — GLUCOSAMINE HCL/CHONDROITIN SU 500-400 MG
CAPSULE ORAL
Qty: 100 STRIP | Refills: 1 | Status: SHIPPED | OUTPATIENT
Start: 2021-01-21 | End: 2021-07-29 | Stop reason: SDUPTHER

## 2021-01-21 ASSESSMENT — ENCOUNTER SYMPTOMS
SORE THROAT: 0
ABDOMINAL PAIN: 0
VOMITING: 0
BACK PAIN: 0
CONSTIPATION: 0
DIARRHEA: 0
BLURRED VISION: 1
SHORTNESS OF BREATH: 0
BLOOD IN STOOL: 0
NAUSEA: 0
COUGH: 0

## 2021-01-21 NOTE — PROGRESS NOTES
HPI Notes    Name: Lisa Rodriguez  : 1975         Chief Complaint:     Chief Complaint   Patient presents with    Diabetes     Patient is here for New Onset of Diabetes. His A1C was 11.6 yesterday 2021.  Health Maintenance     Patient had eye exam on 2021 at [facility] in [place]. History of Present Illness:        Diabetes  He presents for his initial diabetic visit. He has type 2 diabetes mellitus. His disease course has been stable. There are no hypoglycemic associated symptoms. Pertinent negatives for hypoglycemia include no dizziness, headaches, nervousness/anxiousness, seizures or tremors. Associated symptoms include blurred vision, fatigue, polydipsia and polyuria. Pertinent negatives for diabetes include no chest pain and no weakness. There are no hypoglycemic complications. Symptoms are stable. Risk factors for coronary artery disease include diabetes mellitus, male sex and obesity. When asked about current treatments, none were reported. His weight is fluctuating minimally. He is following a generally unhealthy diet. Eye exam is current (had eye exam this week). Past Medical History:     Past Medical History:   Diagnosis Date    Chicken pox     Chronic back pain     Gout     Headache(784.0)     Hepatitis B 2017    Core antibody IgG/IgM -- positive    Hepatitis C 2017    Viral RNA by PCR -- detected    Hernia     Hypertension     Osteoarthritis       Reviewed all health maintenance requirements and ordered appropriate tests  Health Maintenance Due   Topic Date Due    Hepatitis A vaccine (1 of 2 - Risk 2-dose series) 1976    Pneumococcal 0-64 years Vaccine (1 of 1 - PPSV23) 1981    Diabetic retinal exam  1985    Diabetic microalbuminuria test  1993    Hepatitis B vaccine (1 of 3 - Risk 3-dose series) 1994    Flu vaccine (1) 2020       Past Surgical History:     No past surgical history on file.      Medications: Prior to Admission medications    Medication Sig Start Date End Date Taking? Authorizing Provider   metFORMIN (GLUCOPHAGE-XR) 500 MG extended release tablet Take 1 tablet by mouth daily (with breakfast) 1/21/21  Yes J CARLOS Husian CNP   glimepiride (AMARYL) 2 MG tablet Take 1 tablet by mouth every morning (before breakfast) 1/21/21  Yes J CARLOS Husain CNP   blood glucose monitor kit and supplies Check twice daily. Whatever brand covered by insurance 1/21/21  Yes J CARLOS Husain CNP   blood glucose monitor strips Check twice daily. Whatever brand covered by insurance. 1/21/21  Yes J CARLOS Husain CNP   Lancets MISC Check blood sugar twice daily. Whatever brand covered by insurance.  1/21/21  Yes J CARLOS Husain CNP   hydrOXYzine (VISTARIL) 25 MG capsule Take 1 capsule by mouth 3 times daily as needed for Anxiety 12/21/20 3/21/21 Yes Arun Wang,    cloNIDine (CATAPRES) 0.2 MG tablet Take 1 tablet by mouth 2 times daily 12/10/20  Yes J CARLOS Husain CNP   tiZANidine (ZANAFLEX) 4 MG tablet Take 1 tablet by mouth nightly as needed (neck pain/ muscle spasms) 12/10/20  Yes J CARLOS Husain CNP   lisinopril (PRINIVIL;ZESTRIL) 40 MG tablet Take 1 tablet by mouth daily 12/10/20  Yes J CARLOS Husain CNP   hydroCHLOROthiazide (HYDRODIURIL) 25 MG tablet Take 1 tablet by mouth every morning 12/10/20  Yes J CARLOS Husain CNP   atorvastatin (LIPITOR) 80 MG tablet Take 1 tablet by mouth daily 12/10/20  Yes J CARLOS Husain CNP   pantoprazole (PROTONIX) 40 MG tablet TAKE 1 TABLET BY MOUTH EVERY DAY 10/12/20  Yes J CARLOS Husain CNP   gabapentin (NEURONTIN) 800 MG tablet TAKE 1 TABLET BY MOUTH THREE TIMES DAILY 10/12/20 4/12/21 Yes J CARLOS Husain CNP   allopurinol (ZYLOPRIM) 300 MG tablet TAKE 1 TABLET BY MOUTH EVERY DAY 10/12/20  Yes J CARLOS Husain CNP        Allergies: Bee venom and Penicillins    Social History:     Tobacco:    reports that he has been smoking cigarettes. He has been smoking about 2.00 packs per day. He has never used smokeless tobacco.  Alcohol:      reports no history of alcohol use. Drug Use:  reports no history of drug use. Family History:        Family History   Problem Relation Age of Onset    Diabetes Mother     Diabetes Father     Heart Disease Brother        Review of Systems:         Review of Systems   Constitutional: Positive for fatigue. Negative for chills and fever. HENT: Negative for sore throat. Eyes: Positive for blurred vision. Respiratory: Negative for cough and shortness of breath. Cardiovascular: Negative for chest pain, palpitations and leg swelling. Gastrointestinal: Negative for abdominal pain, blood in stool, constipation, diarrhea, nausea and vomiting. Endocrine: Positive for polydipsia and polyuria. Genitourinary: Negative for dysuria, frequency and hematuria. Musculoskeletal: Negative for back pain and neck pain. Skin: Negative for rash. Neurological: Negative for dizziness, tremors, seizures, weakness and headaches. Hematological: Does not bruise/bleed easily. Psychiatric/Behavioral: Negative for suicidal ideas. The patient is not nervous/anxious. Physical Exam:     Vitals:  /70   Pulse 128   Temp 98.1 °F (36.7 °C) (Oral)   Wt 280 lb (127 kg)   SpO2 98%   BMI 35.95 kg/m²       Physical Exam  Vitals signs and nursing note reviewed. Constitutional:       Appearance: He is well-developed. Cardiovascular:      Rate and Rhythm: Normal rate and regular rhythm. Heart sounds: S1 normal and S2 normal.   Pulmonary:      Effort: Pulmonary effort is normal. No respiratory distress. Breath sounds: Normal breath sounds. Abdominal:      General: Bowel sounds are normal.      Palpations: Abdomen is soft. Tenderness: There is no abdominal tenderness.    Feet:      Right foot: Protective Sensation: 8 sites tested. 4 sites sensed. Skin integrity: No ulcer, blister, skin breakdown or erythema. Toenail Condition: Right toenails are abnormally thick. Left foot:      Protective Sensation: 8 sites tested. 4 sites sensed. Skin integrity: No ulcer, blister, skin breakdown or erythema. Toenail Condition: Left toenails are abnormally thick. Skin:     General: Skin is warm and dry. Psychiatric:         Behavior: Behavior is cooperative. Data:     Lab Results   Component Value Date     08/20/2020    K 4.1 08/20/2020     08/20/2020    CO2 28 08/20/2020    BUN 15 08/20/2020    CREATININE 0.86 08/20/2020    GLUCOSE 142 08/20/2020    GLUCOSE 127 12/26/2011    PROT 7.9 08/20/2020    LABALBU 4.4 08/20/2020    BILITOT 1.01 08/20/2020    ALKPHOS 86 08/20/2020    AST 24 08/20/2020    ALT 24 08/20/2020     Lab Results   Component Value Date    WBC 10.4 08/20/2020    RBC 5.17 08/20/2020    RBC 4.41 12/26/2011    HGB 15.2 08/20/2020    HCT 45.1 08/20/2020    MCV 87.3 08/20/2020    MCH 29.5 08/20/2020    MCHC 33.7 08/20/2020    RDW 13.9 08/20/2020     08/20/2020     12/26/2011    MPV NOT REPORTED 08/20/2020     Lab Results   Component Value Date    TSH 2.82 03/09/2015     Lab Results   Component Value Date    CHOL 92 08/20/2020    HDL 22 08/20/2020    LABA1C 11.6 01/20/2021          Assessment & Plan        Diagnosis Orders   1. Type 2 diabetes mellitus with diabetic polyneuropathy, without long-term current use of insulin (HCC)  POCT microalbumin    HM DIABETES FOOT EXAM    metFORMIN (GLUCOPHAGE-XR) 500 MG extended release tablet    glimepiride (AMARYL) 2 MG tablet     pt extensively educated about disease process. Pt educated about carbohydrate control and limiting simple sugars. Patient educated about metformin and mechanism of action. Will also start pt on glimepiride. Patient educated about signs and symptoms of hypoglycemia.   Patient educated about use of glucometer. Patient educated about the importance of controlling his diet and regular exercise as mechanisms for controlling diabetes. Patient encouraged that diabetes is manageable, however it is not curable. Patient verbalizes understanding and agreement with plan. All questions answered. Completed Refills   Requested Prescriptions     Signed Prescriptions Disp Refills    metFORMIN (GLUCOPHAGE-XR) 500 MG extended release tablet 30 tablet 2     Sig: Take 1 tablet by mouth daily (with breakfast)    glimepiride (AMARYL) 2 MG tablet 30 tablet 2     Sig: Take 1 tablet by mouth every morning (before breakfast)    blood glucose monitor kit and supplies 1 kit 0     Sig: Check twice daily. Whatever brand covered by insurance    blood glucose monitor strips 100 strip 1     Sig: Check twice daily. Whatever brand covered by insurance.  Lancets MISC 100 each 3     Sig: Check blood sugar twice daily. Whatever brand covered by insurance. No follow-ups on file. Orders Placed This Encounter   Medications    metFORMIN (GLUCOPHAGE-XR) 500 MG extended release tablet     Sig: Take 1 tablet by mouth daily (with breakfast)     Dispense:  30 tablet     Refill:  2    glimepiride (AMARYL) 2 MG tablet     Sig: Take 1 tablet by mouth every morning (before breakfast)     Dispense:  30 tablet     Refill:  2    blood glucose monitor kit and supplies     Sig: Check twice daily. Whatever brand covered by insurance     Dispense:  1 kit     Refill:  0    blood glucose monitor strips     Sig: Check twice daily. Whatever brand covered by insurance. Dispense:  100 strip     Refill:  1    Lancets MISC     Sig: Check blood sugar twice daily. Whatever brand covered by insurance.      Dispense:  100 each     Refill:  3     Orders Placed This Encounter   Procedures    POCT microalbumin    HM DIABETES FOOT EXAM         Patient Instructions       SURVEY:    You may be receiving a survey from [employer] regarding your visit today. Please complete the survey to enable us to provide the highest quality of care to you and your family. If you cannot score us a very good on any question, please call the office to discuss how we could of made your experience a very good one. Thank you. Patient Education        Hypoglycemia: Care Instructions  Your Care Instructions     Hypoglycemia means that your blood sugar is low and your body is not getting enough fuel. Some people get low blood sugar from not eating often enough. Some medicines to treat diabetes can cause low blood sugar. People who have had surgery on their stomachs or intestines may get hypoglycemia. Problems with the pancreas, kidneys, or liver also can cause low blood sugar. A snack or drink with sugar in it will raise your blood sugar and should ease your symptoms right away. Your doctor may recommend that you change or stop your medicines until you can get your blood sugar levels under control. In the long run, you may need to change your diet and eating habits so that you get enough fuel for your body throughout the day. Follow-up care is a key part of your treatment and safety. Be sure to make and go to all appointments, and call your doctor if you are having problems. It's also a good idea to know your test results and keep a list of the medicines you take. How can you care for yourself at home? · Know the early signs of low blood sugar. Signs include:  ? Nausea. ? Hunger. ? Feeling nervous, irritable, or shaky. ? Cold, clammy skin. ? Sweating (when you're not exercising). ? A fast heartbeat.  ? Numbness or tingling in fingertips or lips. · If you have early signs of low blood sugar, eat or drink a quick-sugar food. Examples are glucose tablets, table sugar, hard candy (such as Life Savers), fruit juice, and regular (not diet) soda. · Eat small, frequent meals so you don't get too hungry between meals.   · Balance extra exercise supplies 1 kit 0     Sig: Check twice daily. Whatever brand covered by insurance    blood glucose monitor strips 100 strip 1     Sig: Check twice daily. Whatever brand covered by insurance.  Lancets MISC 100 each 3     Sig: Check blood sugar twice daily. Whatever brand covered by insurance. Kiki Lassiter received counseling on the following healthy behaviors: nutrition, exercise and medication adherence  Reviewed prior labs and health maintenance. Continue current medications, diet and exercise. Discussed use, benefit, and side effects of prescribed medications. Barriers to medication compliance addressed. Patient given educational materials - see patient instructions. All patient questions answered. Patient voiced understanding.

## 2021-01-21 NOTE — LETTER
[address]  [address] [facility], [place], operated by [facility] [address]  Phone: [phone]  Fax: Laury Livingston 7446, APRN - CNP        January 21, 2021    [address] [place] [address]      Dear Can Hirsch:    30-45 grams of carbohydrates per meal  Anything white and fluffy is usually a carbohydrate  Read food labels, pay attention to serving size and carbohydrates  Eat consistently, don't go all day without eating  Eat breakfast, lunch, dinner (30-45 grams carbohydrate per meal)  Bedtime snack of 15g of carbohydrates    No NEVER foods:  Cereal  Oatmeal  Pizza  Fast Food          If you have any questions or concerns, please don't hesitate to call.     Sincerely,          Barbarann Phalen, J CARLOS - CNP

## 2021-01-21 NOTE — PATIENT INSTRUCTIONS
SURVEY:    You may be receiving a survey from [employer] regarding your visit today. Please complete the survey to enable us to provide the highest quality of care to you and your family. If you cannot score us a very good on any question, please call the office to discuss how we could of made your experience a very good one. Thank you. Patient Education        Hypoglycemia: Care Instructions  Your Care Instructions     Hypoglycemia means that your blood sugar is low and your body is not getting enough fuel. Some people get low blood sugar from not eating often enough. Some medicines to treat diabetes can cause low blood sugar. People who have had surgery on their stomachs or intestines may get hypoglycemia. Problems with the pancreas, kidneys, or liver also can cause low blood sugar. A snack or drink with sugar in it will raise your blood sugar and should ease your symptoms right away. Your doctor may recommend that you change or stop your medicines until you can get your blood sugar levels under control. In the long run, you may need to change your diet and eating habits so that you get enough fuel for your body throughout the day. Follow-up care is a key part of your treatment and safety. Be sure to make and go to all appointments, and call your doctor if you are having problems. It's also a good idea to know your test results and keep a list of the medicines you take. How can you care for yourself at home? · Know the early signs of low blood sugar. Signs include:  ? Nausea. ? Hunger. ? Feeling nervous, irritable, or shaky. ? Cold, clammy skin. ? Sweating (when you're not exercising). ? A fast heartbeat.  ? Numbness or tingling in fingertips or lips. · If you have early signs of low blood sugar, eat or drink a quick-sugar food. Examples are glucose tablets, table sugar, hard candy (such as Life Savers), fruit juice, and regular (not diet) soda. · Eat small, frequent meals so you don't get too hungry between meals. · Balance extra exercise with eating more. · Keep a written record of your low blood sugar episodes, including what and when you last ate. This helps you know what causes your blood sugar to drop. · Make sure family, friends, and coworkers know the symptoms of low blood sugar and know how to get your sugar level up. · Wear medical alert jewelry that lists your condition. You can buy this at most drugstores. When should you call for help? Call 911 anytime you think you may need emergency care. For example, call if:    · You passed out (lost consciousness).     · You are confused or cannot think clearly.     · Your blood sugar is very high or very low. Watch closely for changes in your health, and be sure to contact your doctor if:    · Your blood sugar stays outside the level your doctor set for you.     · You have any problems. Where can you learn more? Go to [email or web address]://CEINT.[employer]. org and sign in to your [employer] account. Enter E234 in the Vital Insight box to learn more about \"Hypoglycemia: Care Instructions. \"     If you do not have an account, please click on the \"Sign Up Now\" link. Current as of: December 20, 2019               Content Version: 12.6  © 0019-0879 [employer]. Care instructions adapted under license by [facility] ([facility]). If you have questions about a medical condition or this instruction, always ask your healthcare professional. [place] [address] any warranty or liability for your use of this information.

## 2021-02-08 ENCOUNTER — TELEPHONE (OUTPATIENT)
Dept: FAMILY MEDICINE CLINIC | Age: 46
End: 2021-02-08

## 2021-02-08 NOTE — TELEPHONE ENCOUNTER
Spoke with patient and he was concerned about his vision. He is having trouble seeing up close, he did say his distant vision was getting better but not as good now. Told him to try reading glasses and see if this helps with his vision. He had eye exam about 1 month ago and vision was ok, eye doctor said it was DM was reason he was having blurred vision. He has f/u visit on 4/22/21 for recheck on DM  Patient voices understanding.

## 2021-03-25 RX ORDER — HYDROXYZINE PAMOATE 25 MG/1
25 CAPSULE ORAL 3 TIMES DAILY PRN
Qty: 270 CAPSULE | Refills: 0 | Status: SHIPPED | OUTPATIENT
Start: 2021-03-25 | End: 2021-06-10 | Stop reason: SDUPTHER

## 2021-03-25 NOTE — TELEPHONE ENCOUNTER
Last visit:  1/21/2021  Next Visit Date:    Future Appointments   Date Time Provider Debbie Rosales   4/22/2021  1:00 PM J CARLOS Sun CNP [facility]   5/24/2021  1:00 PM J CARLOS Sun CNP [facility]         Medication List:  Prior to Admission medications    Medication Sig Start Date End Date Taking? Authorizing Provider   metFORMIN (GLUCOPHAGE-XR) 500 MG extended release tablet Take 1 tablet by mouth daily (with breakfast) 1/21/21   J CARLOS Sun CNP   glimepiride (AMARYL) 2 MG tablet Take 1 tablet by mouth every morning (before breakfast) 1/21/21   J CARLOS Sun CNP   blood glucose monitor kit and supplies Check twice daily. Whatever brand covered by insurance 1/21/21   J CARLOS Sun CNP   blood glucose monitor strips Check twice daily. Whatever brand covered by insurance. 1/21/21   J CARLOS Sun CNP   Lancets MISC Check blood sugar twice daily. Whatever brand covered by insurance.  1/21/21   J CARLOS Sun CNP   cloNIDine (CATAPRES) 0.2 MG tablet Take 1 tablet by mouth 2 times daily 12/10/20   J CARLOS Sun CNP   tiZANidine (ZANAFLEX) 4 MG tablet Take 1 tablet by mouth nightly as needed (neck pain/ muscle spasms) 12/10/20   J CARLOS Sun CNP   lisinopril (PRINIVIL;ZESTRIL) 40 MG tablet Take 1 tablet by mouth daily 12/10/20   J CARLOS Sun CNP   hydroCHLOROthiazide (HYDRODIURIL) 25 MG tablet Take 1 tablet by mouth every morning 12/10/20   J CARLOS Sun CNP   atorvastatin (LIPITOR) 80 MG tablet Take 1 tablet by mouth daily 12/10/20   Luz Hwang, J CARLOS Ivey CNP   pantoprazole (PROTONIX) 40 MG tablet TAKE 1 TABLET BY MOUTH EVERY DAY 10/12/20   J CARLOS Sun CNP   gabapentin (NEURONTIN) 800 MG tablet TAKE 1 TABLET BY MOUTH THREE TIMES DAILY 10/12/20 4/12/21  Naomia Dallas, APRN - CNP   allopurinol (ZYLOPRIM) 300 MG tablet TAKE 1 TABLET BY MOUTH EVERY DAY 10/12/20   Delfin Ron, APRN - CNP

## 2021-03-25 NOTE — TELEPHONE ENCOUNTER
Vistaril 25 mg    DM-morris    Last check up 1/21/21  Future appointment 4/22    Health Maintenance   Topic Date Due    Hepatitis A vaccine (1 of 2 - Risk 2-dose series) Never done    Pneumococcal 0-64 years Vaccine (1 of 1 - PPSV23) Never done    Diabetic retinal exam  Never done    COVID-19 Vaccine (1) Never done    Hepatitis B vaccine (1 of 3 - Risk 3-dose series) Never done    Flu vaccine (1) Never done    A1C test (Diabetic or Prediabetic)  04/20/2021    Lipid screen  08/20/2021    Potassium monitoring  08/20/2021    Creatinine monitoring  08/20/2021    Diabetic foot exam  01/21/2022    Diabetic microalbuminuria test  01/21/2022    DTaP/Tdap/Td vaccine (2 - Td) 07/31/2026    HIV screen  Completed    Hib vaccine  Aged Out    Meningococcal (ACWY) vaccine  Aged Out             (applicable per patient's age: Cancer Screenings, Depression Screening, Fall Risk Screening, Immunizations)    Hemoglobin A1C (%)   Date Value   01/20/2021 11.6   08/20/2020 5.8   04/01/2019 5.7     LDL Cholesterol (mg/dL)   Date Value   08/20/2020 45     AST (U/L)   Date Value   08/20/2020 24     ALT (U/L)   Date Value   08/20/2020 24     BUN (mg/dL)   Date Value   08/20/2020 15      (goal A1C is < 7)   (goal LDL is <100) need 30-50% reduction from baseline     BP Readings from Last 3 Encounters:   01/21/21 120/70   01/15/21 110/68   12/10/20 124/80    (goal /80)      All Future Testing planned in [facility]:  Lab Frequency Next Occurrence   COVID-19 Ambulatory Once 01/15/2021       Next Visit Date:  Future Appointments   Date Time Provider Debbie Rosales   4/22/2021  1:00 PM J CARLOS Armendariz CNP [facility]   5/24/2021  1:00 PM J CARLOS Armendariz CNP [facility]            Patient Active Problem List:     HTN (hypertension)     Arthritis     Gout     Mass of right wrist     Gastroesophageal reflux disease without esophagitis     History of heroin abuse ([address]     Chronic hepatitis C

## 2021-04-22 ENCOUNTER — OFFICE VISIT (OUTPATIENT)
Dept: FAMILY MEDICINE CLINIC | Age: 46
End: 2021-04-22
Payer: MEDICARE

## 2021-04-22 VITALS
WEIGHT: 262 LBS | BODY MASS INDEX: 33.64 KG/M2 | OXYGEN SATURATION: 97 % | HEART RATE: 84 BPM | DIASTOLIC BLOOD PRESSURE: 60 MMHG | TEMPERATURE: 98 F | SYSTOLIC BLOOD PRESSURE: 104 MMHG

## 2021-04-22 DIAGNOSIS — E11.42 TYPE 2 DIABETES MELLITUS WITH DIABETIC POLYNEUROPATHY, WITHOUT LONG-TERM CURRENT USE OF INSULIN (HCC): Primary | ICD-10-CM

## 2021-04-22 DIAGNOSIS — B18.2 CHRONIC HEPATITIS C WITHOUT HEPATIC COMA (HCC): ICD-10-CM

## 2021-04-22 DIAGNOSIS — I10 ESSENTIAL HYPERTENSION: ICD-10-CM

## 2021-04-22 DIAGNOSIS — M1A.0790 IDIOPATHIC CHRONIC GOUT OF ANKLE WITHOUT TOPHUS, UNSPECIFIED LATERALITY: ICD-10-CM

## 2021-04-22 DIAGNOSIS — M54.50 CHRONIC BILATERAL LOW BACK PAIN WITHOUT SCIATICA: ICD-10-CM

## 2021-04-22 DIAGNOSIS — K21.9 GASTROESOPHAGEAL REFLUX DISEASE WITHOUT ESOPHAGITIS: ICD-10-CM

## 2021-04-22 DIAGNOSIS — G89.29 CHRONIC BILATERAL LOW BACK PAIN WITHOUT SCIATICA: ICD-10-CM

## 2021-04-22 LAB — HBA1C MFR BLD: 5.5 %

## 2021-04-22 PROCEDURE — 3044F HG A1C LEVEL LT 7.0%: CPT | Performed by: NURSE PRACTITIONER

## 2021-04-22 PROCEDURE — G8427 DOCREV CUR MEDS BY ELIG CLIN: HCPCS | Performed by: NURSE PRACTITIONER

## 2021-04-22 PROCEDURE — G8417 CALC BMI ABV UP PARAM F/U: HCPCS | Performed by: NURSE PRACTITIONER

## 2021-04-22 PROCEDURE — 4004F PT TOBACCO SCREEN RCVD TLK: CPT | Performed by: NURSE PRACTITIONER

## 2021-04-22 PROCEDURE — 2022F DILAT RTA XM EVC RTNOPTHY: CPT | Performed by: NURSE PRACTITIONER

## 2021-04-22 PROCEDURE — 83036 HEMOGLOBIN GLYCOSYLATED A1C: CPT | Performed by: NURSE PRACTITIONER

## 2021-04-22 PROCEDURE — 99214 OFFICE O/P EST MOD 30 MIN: CPT | Performed by: NURSE PRACTITIONER

## 2021-04-22 RX ORDER — PANTOPRAZOLE SODIUM 40 MG/1
40 TABLET, DELAYED RELEASE ORAL DAILY
Qty: 30 TABLET | Refills: 5 | Status: SHIPPED | OUTPATIENT
Start: 2021-04-22 | End: 2021-11-01 | Stop reason: SDUPTHER

## 2021-04-22 RX ORDER — GLIMEPIRIDE 2 MG/1
2 TABLET ORAL
Qty: 30 TABLET | Refills: 3 | Status: CANCELLED | OUTPATIENT
Start: 2021-04-22

## 2021-04-22 RX ORDER — ALLOPURINOL 300 MG/1
300 TABLET ORAL DAILY
Qty: 30 TABLET | Refills: 5 | Status: SHIPPED | OUTPATIENT
Start: 2021-04-22 | End: 2021-11-01 | Stop reason: SDUPTHER

## 2021-04-22 RX ORDER — METFORMIN HYDROCHLORIDE 500 MG/1
500 TABLET, EXTENDED RELEASE ORAL
Qty: 30 TABLET | Refills: 2 | Status: SHIPPED | OUTPATIENT
Start: 2021-04-22 | End: 2021-07-22 | Stop reason: SDUPTHER

## 2021-04-22 RX ORDER — GABAPENTIN 800 MG/1
800 TABLET ORAL 3 TIMES DAILY
Qty: 90 TABLET | Refills: 5 | Status: SHIPPED | OUTPATIENT
Start: 2021-04-22 | End: 2021-11-15 | Stop reason: SDUPTHER

## 2021-04-22 ASSESSMENT — ENCOUNTER SYMPTOMS
HEARTBURN: 0
BLOOD IN STOOL: 0
VOMITING: 0
ABDOMINAL PAIN: 0
SHORTNESS OF BREATH: 0
BOWEL INCONTINENCE: 0
DIARRHEA: 0
BACK PAIN: 1
COUGH: 0
NAUSEA: 0
CONSTIPATION: 0
SORE THROAT: 0

## 2021-04-22 NOTE — PROGRESS NOTES
HPI Notes    Name: Arpita Rowe  : 1975         Chief Complaint:     Chief Complaint   Patient presents with    Diabetes Mellitus     Patient here today for 3 month follow up on DM. Last A1C was 11.63 in 2021.  Gout     Needs refill on alloupurinol    Gastroesophageal Reflux     Needs refill on pantoprazole.  Back Pain     Chronic back pain, needs refill on gabapentin 800 mg tid       History of Present Illness:        Gastroesophageal Reflux  He reports no abdominal pain, no chest pain, no coughing, no heartburn, no nausea or no sore throat. This is a chronic problem. The current episode started more than 1 year ago. The problem occurs rarely. The problem has been gradually improving. The symptoms are aggravated by certain foods. Risk factors include caffeine use, lack of exercise, obesity and smoking/tobacco exposure. He has tried a PPI for the symptoms. The treatment provided moderate relief. Back Pain  This is a chronic problem. The current episode started more than 1 year ago. The problem occurs intermittently. The pain is present in the lumbar spine. The quality of the pain is described as aching. The pain does not radiate. The pain is moderate. The pain is the same all the time. The symptoms are aggravated by sitting. Pertinent negatives include no abdominal pain, bladder incontinence, bowel incontinence, chest pain, dysuria, fever, headaches, leg pain, numbness, perianal numbness, tingling or weakness. Risk factors include lack of exercise and obesity. He has tried muscle relaxant (gabapentin) for the symptoms. Diabetes  He presents for his follow-up diabetic visit. He has type 2 diabetes mellitus. His disease course has been improving. There are no hypoglycemic associated symptoms. Pertinent negatives for hypoglycemia include no dizziness, headaches, nervousness/anxiousness, seizures or tremors. Pertinent negatives for diabetes include no chest pain and no weakness.  Symptoms are stable. Risk factors for coronary artery disease include diabetes mellitus, dyslipidemia, hypertension, male sex and obesity. His weight is decreasing steadily. He is following a generally healthy (chicken breast, meat, cheese, eggs, low carb bread) diet. Gout  Gout in right great toe and bilat ankles has been flaring up recently. Has been eating a lot of red meat recently. Past Medical History:     Past Medical History:   Diagnosis Date    Chicken pox     Chronic back pain     Gout     Headache(784.0)     Hepatitis B 03/30/2017    Core antibody IgG/IgM -- positive    Hepatitis C 03/30/2017    Viral RNA by PCR -- detected    Hernia     Hypertension     Osteoarthritis       Reviewed all health maintenance requirements and ordered appropriate tests  Health Maintenance Due   Topic Date Due    Hepatitis A vaccine (1 of 2 - Risk 2-dose series) Never done    Pneumococcal 0-64 years Vaccine (1 of 1 - PPSV23) Never done    Diabetic retinal exam  Never done    COVID-19 Vaccine (1) Never done    Hepatitis B vaccine (1 of 3 - Risk 3-dose series) Never done       Past Surgical History:     No past surgical history on file. Medications:       Prior to Admission medications    Medication Sig Start Date End Date Taking? Authorizing Provider   pantoprazole (PROTONIX) 40 MG tablet Take 1 tablet by mouth daily TAKE 1 TABLET BY MOUTH EVERY DAY 4/22/21  Yes J CARLOS Harris CNP   allopurinol (ZYLOPRIM) 300 MG tablet Take 1 tablet by mouth daily TAKE 1 TABLET BY MOUTH EVERY DAY 4/22/21  Yes J CARLOS Harris - CNP   metFORMIN (GLUCOPHAGE-XR) 500 MG extended release tablet Take 1 tablet by mouth daily (with breakfast) 4/22/21  Yes J CARLOS Harris CNP   gabapentin (NEURONTIN) 800 MG tablet Take 1 tablet by mouth 3 times daily for 180 days.  4/22/21 10/19/21 Yes J CARLOS Chiang CNP   hydrOXYzine (VISTARIL) 25 MG capsule Take 1 capsule by mouth 3 times daily as needed for Anxiety 3/25/21 6/23/21 Yes AlanJ CARLOS Sheppard CNP   blood glucose monitor kit and supplies Check twice daily. Whatever brand covered by insurance 1/21/21  Yes J CARLOS Strickland CNP   blood glucose monitor strips Check twice daily. Whatever brand covered by insurance. 1/21/21  Yes J CARLOS Strickland CNP   Lancets MISC Check blood sugar twice daily. Whatever brand covered by insurance. 1/21/21  Yes J CARLOS Strickland CNP   cloNIDine (CATAPRES) 0.2 MG tablet Take 1 tablet by mouth 2 times daily 12/10/20  Yes J CARLOS Strickland CNP   tiZANidine (ZANAFLEX) 4 MG tablet Take 1 tablet by mouth nightly as needed (neck pain/ muscle spasms) 12/10/20  Yes J CARLOS Strickland CNP   lisinopril (PRINIVIL;ZESTRIL) 40 MG tablet Take 1 tablet by mouth daily 12/10/20  Yes J CARLOS Strickland CNP   hydroCHLOROthiazide (HYDRODIURIL) 25 MG tablet Take 1 tablet by mouth every morning 12/10/20  Yes J CARLOS Strickland CNP   atorvastatin (LIPITOR) 80 MG tablet Take 1 tablet by mouth daily 12/10/20  Yes J CARLOS Strickland CNP        Allergies:       Bee venom and Penicillins    Social History:     Tobacco:    reports that he has been smoking cigarettes. He has been smoking about 2.00 packs per day. He has never used smokeless tobacco.  Alcohol:      reports no history of alcohol use. Drug Use:  reports no history of drug use. Family History:        Family History   Problem Relation Age of Onset    Diabetes Mother     Diabetes Father     Heart Disease Brother        Review of Systems:         Review of Systems   Constitutional: Negative for chills and fever. HENT: Negative for sore throat. Respiratory: Negative for cough and shortness of breath. Cardiovascular: Negative for chest pain, palpitations and leg swelling. Gastrointestinal: Negative for abdominal pain, blood in stool, bowel incontinence, constipation, diarrhea, heartburn, nausea and vomiting. Genitourinary: Negative for bladder incontinence, dysuria, frequency and hematuria. Musculoskeletal: Positive for back pain. Negative for neck pain. Skin: Negative for rash. Neurological: Negative for dizziness, tingling, tremors, seizures, weakness, numbness and headaches. Hematological: Does not bruise/bleed easily. Psychiatric/Behavioral: Negative for suicidal ideas. The patient is not nervous/anxious. Physical Exam:     Vitals:  /60   Pulse 84   Temp 98 °F (36.7 °C) (Oral)   Wt 262 lb (118.8 kg)   SpO2 97%   BMI 33.64 kg/m²       Physical Exam  Vitals signs and nursing note reviewed. Constitutional:       Appearance: He is well-developed. HENT:      Head: Normocephalic. Right Ear: Hearing, tympanic membrane and external ear normal.      Left Ear: Tympanic membrane and external ear normal.      Nose: Nose normal.      Mouth/Throat:      Pharynx: Uvula midline. No oropharyngeal exudate. Eyes:      Conjunctiva/sclera: Conjunctivae normal.      Pupils: Pupils are equal, round, and reactive to light. Neck:      Musculoskeletal: Normal range of motion and neck supple. Vascular: No carotid bruit. Cardiovascular:      Rate and Rhythm: Normal rate and regular rhythm. Pulses:           Dorsalis pedis pulses are 2+ on the right side and 2+ on the left side. Heart sounds: S1 normal and S2 normal.   Pulmonary:      Effort: Pulmonary effort is normal. No respiratory distress. Breath sounds: Normal breath sounds. Abdominal:      Palpations: Abdomen is soft. Tenderness: There is no abdominal tenderness. Musculoskeletal: Normal range of motion. Skin:     General: Skin is warm and dry. Findings: No rash. Neurological:      Mental Status: He is alert and oriented to person, place, and time. GCS: GCS eye subscore is 4. GCS verbal subscore is 5. GCS motor subscore is 6. Deep Tendon Reflexes: Reflexes are normal and symmetric.    Psychiatric: Speech: Speech normal.         Behavior: Behavior normal. Behavior is cooperative. Thought Content: Thought content normal.         Judgment: Judgment normal.               Data:     Lab Results   Component Value Date     08/20/2020    K 4.1 08/20/2020     08/20/2020    CO2 28 08/20/2020    BUN 15 08/20/2020    CREATININE 0.86 08/20/2020    GLUCOSE 142 08/20/2020    GLUCOSE 127 12/26/2011    PROT 7.9 08/20/2020    LABALBU 4.4 08/20/2020    BILITOT 1.01 08/20/2020    ALKPHOS 86 08/20/2020    AST 24 08/20/2020    ALT 24 08/20/2020     Lab Results   Component Value Date    WBC 10.4 08/20/2020    RBC 5.17 08/20/2020    RBC 4.41 12/26/2011    HGB 15.2 08/20/2020    HCT 45.1 08/20/2020    MCV 87.3 08/20/2020    MCH 29.5 08/20/2020    MCHC 33.7 08/20/2020    RDW 13.9 08/20/2020     08/20/2020     12/26/2011    MPV NOT REPORTED 08/20/2020     Lab Results   Component Value Date    TSH 2.82 03/09/2015     Lab Results   Component Value Date    CHOL 92 08/20/2020    HDL 22 08/20/2020    LABA1C 5.5 04/22/2021          Assessment & Plan        Diagnosis Orders   1. Type 2 diabetes mellitus with diabetic polyneuropathy, without long-term current use of insulin (HCC)  --A1c=5.5% today (previously 11.7%). pt has done well with changing his diet and taking medication. Will discontinue amaryl. Continue diet and metformin. metFORMIN (GLUCOPHAGE-XR) 500 MG extended release tablet    POCT glycosylated hemoglobin (Hb A1C)   2. Gastroesophageal reflux disease without esophagitis   --doing well with PPI. Continue diet modification. 3. Chronic bilateral low back pain without sciatica  --controlled with gabapentin  gabapentin (NEURONTIN) 800 MG tablet   4. Essential hypertension   --BP controlled at this time. 5. Chronic hepatitis C without hepatic coma ([facility] [address]     6.  Idiopathic chronic gout of ankle without tophus, unspecified laterality   --will start back on allopurinol      Patient verbalizes Conchis Cole LPN    Clerical Team: [facility]        Electronically signed by J CARLOS Razo CNP on 4/22/2021 at 1:32 PM           Completed Refills      Requested Prescriptions     Signed Prescriptions Disp Refills    pantoprazole (PROTONIX) 40 MG tablet 30 tablet 5     Sig: Take 1 tablet by mouth daily TAKE 1 TABLET BY MOUTH EVERY DAY    allopurinol (ZYLOPRIM) 300 MG tablet 30 tablet 5     Sig: Take 1 tablet by mouth daily TAKE 1 TABLET BY MOUTH EVERY DAY    metFORMIN (GLUCOPHAGE-XR) 500 MG extended release tablet 30 tablet 2     Sig: Take 1 tablet by mouth daily (with breakfast)    gabapentin (NEURONTIN) 800 MG tablet 90 tablet 5     Sig: Take 1 tablet by mouth 3 times daily for 180 days. Wynell Saint received counseling on the following healthy behaviors: nutrition and medication adherence  Reviewed prior labs and health maintenance. Continue current medications, diet and exercise. Discussed use, benefit, and side effects of prescribed medications. Barriers to medication compliance addressed. Patient given educational materials - see patient instructions. All patient questions answered. Patient voiced understanding.

## 2021-04-22 NOTE — PATIENT INSTRUCTIONS
SURVEY:    You may be receiving a survey from [employer] regarding your visit today. Please complete the survey to enable us to provide the highest quality of care to you and your family. If you cannot score us a very good (5 Stars) on any question, please call the office to discuss how we could have made your experience a very good one. Thank you.     Clinical Care Team: Clorinda Bangs, APRN-CNP Chrys Cowden, LPN    Clerical Team: Ene Millerradha                           [facility]

## 2021-06-10 RX ORDER — LISINOPRIL 40 MG/1
40 TABLET ORAL DAILY
Qty: 90 TABLET | Refills: 1 | Status: SHIPPED | OUTPATIENT
Start: 2021-06-10 | End: 2021-11-22 | Stop reason: SDUPTHER

## 2021-06-10 RX ORDER — ATORVASTATIN CALCIUM 80 MG/1
80 TABLET, FILM COATED ORAL DAILY
Qty: 90 TABLET | Refills: 1 | Status: SHIPPED | OUTPATIENT
Start: 2021-06-10 | End: 2021-11-22 | Stop reason: SDUPTHER

## 2021-06-10 RX ORDER — HYDROXYZINE PAMOATE 25 MG/1
25 CAPSULE ORAL 3 TIMES DAILY PRN
Qty: 270 CAPSULE | Refills: 0 | Status: SHIPPED | OUTPATIENT
Start: 2021-06-10 | End: 2021-08-12 | Stop reason: SDUPTHER

## 2021-06-10 RX ORDER — CLONIDINE HYDROCHLORIDE 0.2 MG/1
0.2 TABLET ORAL 2 TIMES DAILY
Qty: 180 TABLET | Refills: 1 | Status: SHIPPED | OUTPATIENT
Start: 2021-06-10 | End: 2021-11-22 | Stop reason: SDUPTHER

## 2021-06-10 NOTE — TELEPHONE ENCOUNTER
Patient needs new scripts for Atorvastatin, Lisinopril, Clonidine, & Hydroxyzine - patient uses [facility] in [facility] Maintenance   Topic Date Due    Hepatitis A vaccine (1 of 2 - Risk 2-dose series) Never done    Pneumococcal 0-64 years Vaccine (1 of 2 - PPSV23) Never done    Diabetic retinal exam  Never done    COVID-19 Vaccine (1) Never done    Hepatitis B vaccine (1 of 3 - Risk 3-dose series) Never done    Lipid screen  08/20/2021    Potassium monitoring  08/20/2021    Creatinine monitoring  08/20/2021    Flu vaccine (Season Ended) 09/01/2021    Diabetic foot exam  01/21/2022    Diabetic microalbuminuria test  01/21/2022    A1C test (Diabetic or Prediabetic)  04/22/2022    DTaP/Tdap/Td vaccine (2 - Td or Tdap) 07/31/2026    HIV screen  Completed    Hib vaccine  Aged Out    Meningococcal (ACWY) vaccine  Aged Out             (applicable per patient's age: Cancer Screenings, Depression Screening, Fall Risk Screening, Immunizations)    Hemoglobin A1C (%)   Date Value   04/22/2021 5.5   01/20/2021 11.6   08/20/2020 5.8     LDL Cholesterol (mg/dL)   Date Value   08/20/2020 45     AST (U/L)   Date Value   08/20/2020 24     ALT (U/L)   Date Value   08/20/2020 24     BUN (mg/dL)   Date Value   08/20/2020 15      (goal A1C is < 7)   (goal LDL is <100) need 30-50% reduction from baseline     BP Readings from Last 3 Encounters:   04/22/21 104/60   01/21/21 120/70   01/15/21 110/68    (goal /80)      All Future Testing planned in [facility]:  Lab Frequency Next Occurrence   COVID-19 Ambulatory Once 01/15/2021       Next Visit Date:  Future Appointments   Date Time Provider Debbie Rosales   7/22/2021  2:20 PM J CARLOS [facility]            Patient Active Problem List:     HTN (hypertension)     Arthritis     Gout     Mass of right wrist     Gastroesophageal reflux disease without esophagitis     History of heroin abuse ([facility] [address]     Chronic hepatitis C without hepatic

## 2021-06-14 RX ORDER — TIZANIDINE 4 MG/1
4 TABLET ORAL NIGHTLY PRN
Qty: 90 TABLET | Refills: 1 | Status: SHIPPED | OUTPATIENT
Start: 2021-06-14 | End: 2021-11-22 | Stop reason: SDUPTHER

## 2021-06-14 NOTE — TELEPHONE ENCOUNTER
Last OV 4/22/21 for DM, back pain, GERD, HTN  Requesting refill on tizanidine  Rx pending  NExt OV 7/22/21

## 2021-07-21 DIAGNOSIS — E11.42 TYPE 2 DIABETES MELLITUS WITH DIABETIC POLYNEUROPATHY, WITHOUT LONG-TERM CURRENT USE OF INSULIN (HCC): ICD-10-CM

## 2021-07-21 NOTE — TELEPHONE ENCOUNTER
Patient had to move his appointment back a week - could we send a 30 day script of Metformin to [facility] Maintenance   Topic Date Due    Hepatitis A vaccine (1 of 2 - Risk 2-dose series) Never done    Pneumococcal 0-64 years Vaccine (1 of 2 - PPSV23) Never done    Diabetic retinal exam  Never done    COVID-19 Vaccine (1) Never done    Hepatitis B vaccine (1 of 3 - Risk 3-dose series) Never done    Colon cancer screen colonoscopy  Never done    Lipid screen  08/20/2021    Potassium monitoring  08/20/2021    Creatinine monitoring  08/20/2021    Flu vaccine (1) 09/01/2021    Diabetic foot exam  01/21/2022    Diabetic microalbuminuria test  01/21/2022    A1C test (Diabetic or Prediabetic)  04/22/2022    DTaP/Tdap/Td vaccine (2 - Td or Tdap) 07/31/2026    HIV screen  Completed    Hib vaccine  Aged Out    Meningococcal (ACWY) vaccine  Aged Out             (applicable per patient's age: Cancer Screenings, Depression Screening, Fall Risk Screening, Immunizations)    Hemoglobin A1C (%)   Date Value   04/22/2021 5.5   01/20/2021 11.6   08/20/2020 5.8     LDL Cholesterol (mg/dL)   Date Value   08/20/2020 45     AST (U/L)   Date Value   08/20/2020 24     ALT (U/L)   Date Value   08/20/2020 24     BUN (mg/dL)   Date Value   08/20/2020 15      (goal A1C is < 7)   (goal LDL is <100) need 30-50% reduction from baseline     BP Readings from Last 3 Encounters:   04/22/21 104/60   01/21/21 120/70   01/15/21 110/68    (goal /80)      All Future Testing planned in [facility]:  Lab Frequency Next Occurrence   COVID-19 Ambulatory Once 01/15/2021       Next Visit Date:  Future Appointments   Date Time Provider Debbie Rosales   7/29/2021  2:40 PM J CARLOS Snider - CNP [facility]            Patient Active Problem List:     HTN (hypertension)     Arthritis     Gout     Mass of right wrist     Gastroesophageal reflux disease without esophagitis     History of heroin abuse ([facility] [address] Chronic hepatitis C without hepatic coma (HCC)     Morbidly obese ([facility] [address]

## 2021-07-22 RX ORDER — METFORMIN HYDROCHLORIDE 500 MG/1
500 TABLET, EXTENDED RELEASE ORAL
Qty: 30 TABLET | Refills: 0 | Status: SHIPPED | OUTPATIENT
Start: 2021-07-22 | End: 2022-02-07 | Stop reason: ALTCHOICE

## 2021-07-29 ENCOUNTER — OFFICE VISIT (OUTPATIENT)
Dept: FAMILY MEDICINE CLINIC | Age: 46
End: 2021-07-29
Payer: MEDICARE

## 2021-07-29 VITALS
HEART RATE: 90 BPM | OXYGEN SATURATION: 97 % | TEMPERATURE: 98.2 F | SYSTOLIC BLOOD PRESSURE: 106 MMHG | DIASTOLIC BLOOD PRESSURE: 74 MMHG | WEIGHT: 246 LBS | BODY MASS INDEX: 31.58 KG/M2

## 2021-07-29 DIAGNOSIS — I10 ESSENTIAL HYPERTENSION: ICD-10-CM

## 2021-07-29 DIAGNOSIS — E11.42 TYPE 2 DIABETES MELLITUS WITH DIABETIC POLYNEUROPATHY, WITHOUT LONG-TERM CURRENT USE OF INSULIN (HCC): Primary | ICD-10-CM

## 2021-07-29 LAB — HBA1C MFR BLD: 5.6 %

## 2021-07-29 PROCEDURE — 2022F DILAT RTA XM EVC RTNOPTHY: CPT | Performed by: NURSE PRACTITIONER

## 2021-07-29 PROCEDURE — G8427 DOCREV CUR MEDS BY ELIG CLIN: HCPCS | Performed by: NURSE PRACTITIONER

## 2021-07-29 PROCEDURE — 99213 OFFICE O/P EST LOW 20 MIN: CPT | Performed by: NURSE PRACTITIONER

## 2021-07-29 PROCEDURE — 4004F PT TOBACCO SCREEN RCVD TLK: CPT | Performed by: NURSE PRACTITIONER

## 2021-07-29 PROCEDURE — 83036 HEMOGLOBIN GLYCOSYLATED A1C: CPT | Performed by: NURSE PRACTITIONER

## 2021-07-29 PROCEDURE — 3044F HG A1C LEVEL LT 7.0%: CPT | Performed by: NURSE PRACTITIONER

## 2021-07-29 PROCEDURE — G8417 CALC BMI ABV UP PARAM F/U: HCPCS | Performed by: NURSE PRACTITIONER

## 2021-07-29 RX ORDER — LANCETS 30 GAUGE
EACH MISCELLANEOUS
Qty: 200 EACH | Refills: 3 | Status: SHIPPED | OUTPATIENT
Start: 2021-07-29

## 2021-07-29 RX ORDER — METFORMIN HYDROCHLORIDE 500 MG/1
500 TABLET, EXTENDED RELEASE ORAL
Qty: 90 TABLET | Refills: 0 | Status: CANCELLED | OUTPATIENT
Start: 2021-07-29

## 2021-07-29 RX ORDER — GLUCOSAMINE HCL/CHONDROITIN SU 500-400 MG
CAPSULE ORAL
Qty: 200 STRIP | Refills: 3 | Status: SHIPPED | OUTPATIENT
Start: 2021-07-29

## 2021-07-29 ASSESSMENT — ENCOUNTER SYMPTOMS
SHORTNESS OF BREATH: 0
NAUSEA: 0
VOMITING: 0
DIARRHEA: 0
VISUAL CHANGE: 0
COUGH: 0

## 2021-07-29 NOTE — PROGRESS NOTES
HPI Notes    Name: Garrett Bernal  : 1975         Chief Complaint:     Chief Complaint   Patient presents with    Diabetes Mellitus     Patient here today for 3 month DM check up. Last A1C was 5.5 in        History of Present Illness:        Diabetes  He presents for his follow-up diabetic visit. He has type 2 diabetes mellitus. His disease course has been improving. There are no hypoglycemic associated symptoms. Pertinent negatives for hypoglycemia include no dizziness, headaches or seizures. Pertinent negatives for diabetes include no chest pain, no fatigue, no visual change and no weight loss. Symptoms are stable. Risk factors for coronary artery disease include diabetes mellitus, male sex and obesity. Current diabetic treatment includes oral agent (monotherapy) and diet. He is compliant with treatment all of the time. He is following a generally healthy (low carb diet) diet. He participates in exercise intermittently. An ACE inhibitor/angiotensin II receptor blocker is being taken. He does not see a podiatrist.Eye exam is not current. Past Medical History:     Past Medical History:   Diagnosis Date    Chicken pox     Chronic back pain     Gout     Headache(784.0)     Hepatitis B 2017    Core antibody IgG/IgM -- positive    Hepatitis C 2017    Viral RNA by PCR -- detected    Hernia     Hypertension     Osteoarthritis       Reviewed all health maintenance requirements and ordered appropriate tests  Health Maintenance Due   Topic Date Due    Hepatitis A vaccine (1 of 2 - Risk 2-dose series) Never done    Pneumococcal 0-64 years Vaccine (1 of 2 - PPSV23) Never done    Diabetic retinal exam  Never done    COVID-19 Vaccine (1) Never done    Hepatitis B vaccine (1 of 3 - Risk 3-dose series) Never done    Colon cancer screen colonoscopy  Never done       Past Surgical History:     No past surgical history on file.      Medications:       Prior to Admission medications Medication Sig Start Date End Date Taking? Authorizing Provider   Lancets MISC Check blood sugar twice daily. Whatever brand covered by insurance. 7/29/21  Yes J CARLOS Soriano CNP   blood glucose monitor strips Check twice daily. Whatever brand covered by insurance. 7/29/21  Yes J CARLOS Soriano CNP   metFORMIN (GLUCOPHAGE-XR) 500 MG extended release tablet Take 1 tablet by mouth daily (with breakfast) 7/22/21  Yes J CARLOS Soriano CNP   tiZANidine (ZANAFLEX) 4 MG tablet Take 1 tablet by mouth nightly as needed (neck pain/ muscle spasms) 6/14/21  Yes J CARLOS Soriano CNP   atorvastatin (LIPITOR) 80 MG tablet Take 1 tablet by mouth daily 6/10/21  Yes Deshawn Dorsey DO   cloNIDine (CATAPRES) 0.2 MG tablet Take 1 tablet by mouth 2 times daily 6/10/21  Yes Deshawn Dorsey DO   lisinopril (PRINIVIL;ZESTRIL) 40 MG tablet Take 1 tablet by mouth daily 6/10/21  Yes Deshawn Dorsey DO   hydrOXYzine (VISTARIL) 25 MG capsule Take 1 capsule by mouth 3 times daily as needed for Anxiety 6/10/21 9/8/21 Yes Deshawn Dorsey DO   pantoprazole (PROTONIX) 40 MG tablet Take 1 tablet by mouth daily TAKE 1 TABLET BY MOUTH EVERY DAY 4/22/21  Yes J CARLOS Soriano CNP   allopurinol (ZYLOPRIM) 300 MG tablet Take 1 tablet by mouth daily TAKE 1 TABLET BY MOUTH EVERY DAY 4/22/21  Yes J CARLOS Soriano CNP   gabapentin (NEURONTIN) 800 MG tablet Take 1 tablet by mouth 3 times daily for 180 days. 4/22/21 10/19/21 Yes J CARLOS Chiang CNP   blood glucose monitor kit and supplies Check twice daily. Whatever brand covered by insurance 1/21/21  Yes J CARLOS Soriano CNP   hydroCHLOROthiazide (HYDRODIURIL) 25 MG tablet Take 1 tablet by mouth every morning 12/10/20  Yes J CARLOS Soriano CNP        Allergies:       Bee venom and Penicillins    Social History:     Tobacco:    reports that he has been smoking cigarettes.  He has been smoking about 2.00 packs per day. He has never used smokeless tobacco.  Alcohol:      reports no history of alcohol use. Drug Use:  reports no history of drug use. Family History:        Family History   Problem Relation Age of Onset    Diabetes Mother     Diabetes Father     Heart Disease Brother        Review of Systems:         Review of Systems   Constitutional: Negative for chills, fatigue, fever and weight loss. Respiratory: Negative for cough and shortness of breath. Cardiovascular: Negative for chest pain and palpitations. Gastrointestinal: Negative for diarrhea, nausea and vomiting. Neurological: Negative for dizziness, seizures and headaches. Physical Exam:     Vitals:  /74   Pulse 90   Temp 98.2 °F (36.8 °C) (Oral)   Wt 246 lb (111.6 kg)   SpO2 97%   BMI 31.58 kg/m²       Physical Exam  Vitals and nursing note reviewed. Constitutional:       Appearance: He is well-developed. Cardiovascular:      Rate and Rhythm: Normal rate and regular rhythm. Heart sounds: S1 normal and S2 normal.   Pulmonary:      Effort: Pulmonary effort is normal. No respiratory distress. Breath sounds: Normal breath sounds. Abdominal:      General: Bowel sounds are normal.      Palpations: Abdomen is soft. Tenderness: There is no abdominal tenderness. Skin:     General: Skin is warm and dry. Psychiatric:         Behavior: Behavior is cooperative.                Data:     Lab Results   Component Value Date     08/20/2020    K 4.1 08/20/2020     08/20/2020    CO2 28 08/20/2020    BUN 15 08/20/2020    CREATININE 0.86 08/20/2020    GLUCOSE 142 08/20/2020    GLUCOSE 127 12/26/2011    PROT 7.9 08/20/2020    LABALBU 4.4 08/20/2020    BILITOT 1.01 08/20/2020    ALKPHOS 86 08/20/2020    AST 24 08/20/2020    ALT 24 08/20/2020     Lab Results   Component Value Date    WBC 10.4 08/20/2020    RBC 5.17 08/20/2020    RBC 4.41 12/26/2011    HGB 15.2 08/20/2020    HCT 45.1 08/20/2020    MCV 87.3 08/20/2020    MCH 29.5 08/20/2020    MCHC 33.7 08/20/2020    RDW 13.9 08/20/2020     08/20/2020     12/26/2011    MPV NOT REPORTED 08/20/2020     Lab Results   Component Value Date    TSH 2.82 03/09/2015     Lab Results   Component Value Date    CHOL 92 08/20/2020    HDL 22 08/20/2020    LABA1C 5.5 04/22/2021          Assessment & Plan        Diagnosis Orders   1. Type 2 diabetes mellitus with diabetic polyneuropathy, without long-term current use of insulin (HCC)  POCT glycosylated hemoglobin (Hb A1C)   2. Essential hypertension       A1c=5.6% (previously 5.5%). pt doing very well with diet and exercise. Will discontinue metformin (even though pt nervous about it). Pt educated to continue diet and exercise. Will recheck in 3 months. BP lower since pt has lost 50#. Decrease clonidine to daily. Continue to monitor. Patient verbalizes understanding and agreement with plan. All questions answered. If symptoms do not resolve or worsen, return to office. Completed Refills   Requested Prescriptions     Signed Prescriptions Disp Refills    Lancets MISC 200 each 3     Sig: Check blood sugar twice daily. Whatever brand covered by insurance.  blood glucose monitor strips 200 strip 3     Sig: Check twice daily. Whatever brand covered by insurance. No follow-ups on file. Orders Placed This Encounter   Medications    Lancets MISC     Sig: Check blood sugar twice daily. Whatever brand covered by insurance. Dispense:  200 each     Refill:  3    blood glucose monitor strips     Sig: Check twice daily. Whatever brand covered by insurance. Dispense:  200 strip     Refill:  3     Orders Placed This Encounter   Procedures    POCT glycosylated hemoglobin (Hb A1C)         Patient Instructions   SURVEY:    You may be receiving a survey from [employer] regarding your visit today.     Please complete the survey to enable us to provide the highest quality of care to you and your family. If you cannot score us a very good (5 Stars) on any question, please call the office to discuss how we could have made your experience a very good one. Thank you. Clinical Care Team: SHANICE Baeza LPN    Clerical Team: Jadyn Guerrero        Electronically signed by J CARLOS Baeza CNP on 7/29/2021 at 3:16 PM           Completed Refills      Requested Prescriptions     Signed Prescriptions Disp Refills    Lancets MISC 200 each 3     Sig: Check blood sugar twice daily. Whatever brand covered by insurance.  blood glucose monitor strips 200 strip 3     Sig: Check twice daily. Whatever brand covered by insurance. Kevin Spann received counseling on the following healthy behaviors: nutrition, exercise and medication adherence  Reviewed prior labs and health maintenance. Continue current medications, diet and exercise. Discussed use, benefit, and side effects of prescribed medications. Barriers to medication compliance addressed. Patient given educational materials - see patient instructions. All patient questions answered. Patient voiced understanding.

## 2021-07-29 NOTE — PATIENT INSTRUCTIONS
SURVEY:    You may be receiving a survey from [employer] regarding your visit today. Please complete the survey to enable us to provide the highest quality of care to you and your family. If you cannot score us a very good (5 Stars) on any question, please call the office to discuss how we could have made your experience a very good one. Thank you.     Clinical Care Team: J CARLOS Kitchen-DAWSON Callejas LPN    Clerical Team: Ene Isbell

## 2021-08-12 RX ORDER — HYDROXYZINE PAMOATE 25 MG/1
25 CAPSULE ORAL 3 TIMES DAILY PRN
Qty: 270 CAPSULE | Refills: 0 | Status: SHIPPED | OUTPATIENT
Start: 2021-08-12 | End: 2021-12-14

## 2021-08-16 RX ORDER — HYDROCHLOROTHIAZIDE 25 MG/1
25 TABLET ORAL EVERY MORNING
Qty: 90 TABLET | Refills: 1 | Status: SHIPPED | OUTPATIENT
Start: 2021-08-16 | End: 2022-02-07 | Stop reason: SDUPTHER

## 2021-08-16 NOTE — TELEPHONE ENCOUNTER
Patient called in on 8/11/21 asking for a refill on Hydrochlorothiazide 25 mg. Can this be refilled? Patient last seen 7/29/21. Next appt 11/01/21.     D[facility] [address] [facility] Maintenance   Topic Date Due    Hepatitis A vaccine (1 of 2 - Risk 2-dose series) Never done    Pneumococcal 0-64 years Vaccine (1 of 2 - PPSV23) Never done    Diabetic retinal exam  Never done    Hepatitis B vaccine (1 of 3 - Risk 3-dose series) Never done    Colon cancer screen colonoscopy  Never done    Lipid screen  08/20/2021    Potassium monitoring  08/20/2021    Creatinine monitoring  08/20/2021    Flu vaccine (1) 09/01/2021    Diabetic foot exam  01/21/2022    Diabetic microalbuminuria test  01/21/2022    A1C test (Diabetic or Prediabetic)  07/29/2022    DTaP/Tdap/Td vaccine (2 - Td or Tdap) 07/31/2026    COVID-19 Vaccine  Completed    HIV screen  Completed    Hib vaccine  Aged Out    Meningococcal (ACWY) vaccine  Aged Out             (applicable per patient's age: Cancer Screenings, Depression Screening, Fall Risk Screening, Immunizations)    Hemoglobin A1C (%)   Date Value   07/29/2021 5.6   04/22/2021 5.5   01/20/2021 11.6     LDL Cholesterol (mg/dL)   Date Value   08/20/2020 45     AST (U/L)   Date Value   08/20/2020 24     ALT (U/L)   Date Value   08/20/2020 24     BUN (mg/dL)   Date Value   08/20/2020 15      (goal A1C is < 7)   (goal LDL is <100) need 30-50% reduction from baseline     BP Readings from Last 3 Encounters:   07/29/21 106/74   04/22/21 104/60   01/21/21 120/70    (goal /80)      All Future Testing planned in [facility]:  Lab Frequency Next Occurrence   COVID-19 Ambulatory Once 01/15/2021       Next Visit Date:  Future Appointments   Date Time Provider Debbie Rosales   11/1/2021  2:00 PM Keven Blount, APRN - CNP [facility]            Patient Active Problem List:     HTN (hypertension)     Arthritis     Gout     Mass of right wrist     Gastroesophageal reflux disease without esophagitis     History of heroin abuse (HCC)     Chronic hepatitis C without hepatic coma (HCC)     Morbidly obese ([address]

## 2021-11-01 ENCOUNTER — OFFICE VISIT (OUTPATIENT)
Dept: FAMILY MEDICINE CLINIC | Age: 46
End: 2021-11-01
Payer: MEDICARE

## 2021-11-01 VITALS
DIASTOLIC BLOOD PRESSURE: 80 MMHG | SYSTOLIC BLOOD PRESSURE: 110 MMHG | OXYGEN SATURATION: 98 % | WEIGHT: 253 LBS | HEIGHT: 74 IN | HEART RATE: 101 BPM | BODY MASS INDEX: 32.47 KG/M2

## 2021-11-01 DIAGNOSIS — E11.42 TYPE 2 DIABETES MELLITUS WITH DIABETIC POLYNEUROPATHY, WITHOUT LONG-TERM CURRENT USE OF INSULIN (HCC): Primary | ICD-10-CM

## 2021-11-01 DIAGNOSIS — K21.9 GASTROESOPHAGEAL REFLUX DISEASE WITHOUT ESOPHAGITIS: ICD-10-CM

## 2021-11-01 LAB — HBA1C MFR BLD: 5.3 %

## 2021-11-01 PROCEDURE — 3044F HG A1C LEVEL LT 7.0%: CPT | Performed by: NURSE PRACTITIONER

## 2021-11-01 PROCEDURE — 83036 HEMOGLOBIN GLYCOSYLATED A1C: CPT | Performed by: NURSE PRACTITIONER

## 2021-11-01 PROCEDURE — 4004F PT TOBACCO SCREEN RCVD TLK: CPT | Performed by: NURSE PRACTITIONER

## 2021-11-01 PROCEDURE — G8484 FLU IMMUNIZE NO ADMIN: HCPCS | Performed by: NURSE PRACTITIONER

## 2021-11-01 PROCEDURE — 99213 OFFICE O/P EST LOW 20 MIN: CPT | Performed by: NURSE PRACTITIONER

## 2021-11-01 PROCEDURE — G8427 DOCREV CUR MEDS BY ELIG CLIN: HCPCS | Performed by: NURSE PRACTITIONER

## 2021-11-01 PROCEDURE — G8417 CALC BMI ABV UP PARAM F/U: HCPCS | Performed by: NURSE PRACTITIONER

## 2021-11-01 PROCEDURE — 2022F DILAT RTA XM EVC RTNOPTHY: CPT | Performed by: NURSE PRACTITIONER

## 2021-11-01 RX ORDER — ALLOPURINOL 300 MG/1
300 TABLET ORAL DAILY
Qty: 30 TABLET | Refills: 5 | Status: SHIPPED | OUTPATIENT
Start: 2021-11-01 | End: 2022-02-07 | Stop reason: SDUPTHER

## 2021-11-01 RX ORDER — PANTOPRAZOLE SODIUM 40 MG/1
40 TABLET, DELAYED RELEASE ORAL DAILY
Qty: 30 TABLET | Refills: 5 | Status: SHIPPED | OUTPATIENT
Start: 2021-11-01 | End: 2022-02-07 | Stop reason: SDUPTHER

## 2021-11-01 SDOH — ECONOMIC STABILITY: FOOD INSECURITY: WITHIN THE PAST 12 MONTHS, THE FOOD YOU BOUGHT JUST DIDN'T LAST AND YOU DIDN'T HAVE MONEY TO GET MORE.: NEVER TRUE

## 2021-11-01 SDOH — ECONOMIC STABILITY: FOOD INSECURITY: WITHIN THE PAST 12 MONTHS, YOU WORRIED THAT YOUR FOOD WOULD RUN OUT BEFORE YOU GOT MONEY TO BUY MORE.: NEVER TRUE

## 2021-11-01 ASSESSMENT — ENCOUNTER SYMPTOMS
VOMITING: 0
NAUSEA: 0
SHORTNESS OF BREATH: 0
COUGH: 0
DIARRHEA: 0
HEARTBURN: 0
GLOBUS SENSATION: 0

## 2021-11-01 ASSESSMENT — SOCIAL DETERMINANTS OF HEALTH (SDOH): HOW HARD IS IT FOR YOU TO PAY FOR THE VERY BASICS LIKE FOOD, HOUSING, MEDICAL CARE, AND HEATING?: NOT HARD AT ALL

## 2021-11-01 NOTE — PROGRESS NOTES
HPI Notes    Name: Joi Burns  : 1975         Chief Complaint:     Chief Complaint   Patient presents with    Diabetes     Pt states that his sugar has been running around 140s now in the morning.  Health Maintenance     Due for eye exam.       History of Present Illness:         Diabetes  He presents for his follow-up diabetic visit. He has type 2 diabetes mellitus. His disease course has been stable. There are no hypoglycemic associated symptoms. Pertinent negatives for hypoglycemia include no dizziness, headaches or seizures. Pertinent negatives for diabetes include no chest pain. Symptoms are stable. Risk factors for coronary artery disease include diabetes mellitus. Current diabetic treatment includes diet. He is compliant with treatment all of the time. He is following a generally healthy diet. He has not had a previous visit with a dietitian. He never participates in exercise. His breakfast blood glucose is taken between 8-9 am. His breakfast blood glucose range is generally 130-140 mg/dl. An ACE inhibitor/angiotensin II receptor blocker is being taken. Eye exam is not current. Gastroesophageal Reflux  He reports no chest pain, no coughing, no globus sensation, no heartburn or no nausea. This is a chronic problem. The current episode started more than 1 year ago. The problem occurs rarely. The problem has been gradually improving. Nothing aggravates the symptoms. Risk factors include caffeine use, lack of exercise, obesity and smoking/tobacco exposure. He has tried a PPI for the symptoms. The treatment provided significant (symptoms come back if he doesn't take PPI) relief.        Past Medical History:     Past Medical History:   Diagnosis Date    Chicken pox     Chronic back pain     Gout     Headache(784.0)     Hepatitis B 2017    Core antibody IgG/IgM -- positive    Hepatitis C 2017    Viral RNA by PCR -- detected    Hernia     Hypertension     Osteoarthritis Reviewed all health maintenance requirements and ordered appropriate tests  Health Maintenance Due   Topic Date Due    Hepatitis A vaccine (1 of 2 - Risk 2-dose series) Never done    Pneumococcal 0-64 years Vaccine (1 of 2 - PPSV23) Never done    Diabetic retinal exam  Never done    Hepatitis B vaccine (1 of 3 - Risk 3-dose series) Never done    Colon cancer screen colonoscopy  Never done    Lipid screen  08/20/2021    Potassium monitoring  08/20/2021    Creatinine monitoring  08/20/2021    Flu vaccine (1) Never done       Past Surgical History:     No past surgical history on file. Medications:       Prior to Admission medications    Medication Sig Start Date End Date Taking? Authorizing Provider   hydroCHLOROthiazide (HYDRODIURIL) 25 MG tablet Take 1 tablet by mouth every morning 8/16/21  Yes Marcy Ngo,    hydrOXYzine (VISTARIL) 25 MG capsule Take 1 capsule by mouth 3 times daily as needed for Anxiety 8/12/21 11/10/21 Yes J CARLOS Menendez CNP   Lancets MISC Check blood sugar twice daily. Whatever brand covered by insurance. 7/29/21  Yes J CARLOS Menendez CNP   blood glucose monitor strips Check twice daily. Whatever brand covered by insurance.  7/29/21  Yes J CARLOS Menendez CNP   tiZANidine (ZANAFLEX) 4 MG tablet Take 1 tablet by mouth nightly as needed (neck pain/ muscle spasms) 6/14/21  Yes J CARLOS Menendez CNP   atorvastatin (LIPITOR) 80 MG tablet Take 1 tablet by mouth daily 6/10/21  Yes Libby Wang,    cloNIDine (CATAPRES) 0.2 MG tablet Take 1 tablet by mouth 2 times daily 6/10/21  Yes Mary Russell DO   lisinopril (PRINIVIL;ZESTRIL) 40 MG tablet Take 1 tablet by mouth daily 6/10/21  Yes Mary Russell DO   pantoprazole (PROTONIX) 40 MG tablet Take 1 tablet by mouth daily TAKE 1 TABLET BY MOUTH EVERY DAY 4/22/21  Yes J CARLOS Menendez CNP   allopurinol (ZYLOPRIM) 300 MG tablet Take 1 tablet by mouth daily TAKE 1 TABLET BY MOUTH EVERY DAY 4/22/21  Yes J CARLOS De La Torre CNP   gabapentin (NEURONTIN) 800 MG tablet Take 1 tablet by mouth 3 times daily for 180 days. 4/22/21 11/1/21 Yes J CARLOS De La Torre CNP   blood glucose monitor kit and supplies Check twice daily. Whatever brand covered by insurance 1/21/21  Yes J CARLOS De La Torre CNP   metFORMIN (GLUCOPHAGE-XR) 500 MG extended release tablet Take 1 tablet by mouth daily (with breakfast)  Patient not taking: Reported on 11/1/2021 7/22/21   J CARLOS De La Torre CNP        Allergies:       Bee venom and Penicillins    Social History:     Tobacco:    reports that he has been smoking cigarettes. He has been smoking about 2.00 packs per day. He has never used smokeless tobacco.  Alcohol:      reports no history of alcohol use. Drug Use:  reports no history of drug use. Family History:        Family History   Problem Relation Age of Onset    Diabetes Mother     Diabetes Father     Heart Disease Brother        Review of Systems:         Review of Systems   Constitutional: Negative for chills and fever. Respiratory: Negative for cough and shortness of breath. Cardiovascular: Negative for chest pain and palpitations. Gastrointestinal: Negative for diarrhea, heartburn, nausea and vomiting. Neurological: Negative for dizziness, seizures and headaches. Physical Exam:     Vitals:  /80   Pulse 101   Ht 6' 2\" (1.88 m)   Wt 253 lb (114.8 kg)   SpO2 98%   BMI 32.48 kg/m²       Physical Exam  Vitals and nursing note reviewed. Constitutional:       Appearance: He is well-developed. Eyes:      Pupils: Pupils are equal, round, and reactive to light. Funduscopic exam:     Right eye: No hemorrhage, exudate or AV nicking. Red reflex present. Left eye: No hemorrhage, exudate or AV nicking. Red reflex present. Cardiovascular:      Rate and Rhythm: Normal rate and regular rhythm.       Heart sounds: S1 normal and S2 normal. Pulmonary:      Effort: Pulmonary effort is normal. No respiratory distress. Breath sounds: Normal breath sounds. Abdominal:      General: Bowel sounds are normal.      Palpations: Abdomen is soft. Tenderness: There is no abdominal tenderness. Skin:     General: Skin is warm and dry. Psychiatric:         Behavior: Behavior is cooperative. Data:     Lab Results   Component Value Date     08/20/2020    K 4.1 08/20/2020     08/20/2020    CO2 28 08/20/2020    BUN 15 08/20/2020    CREATININE 0.86 08/20/2020    GLUCOSE 142 08/20/2020    GLUCOSE 127 12/26/2011    PROT 7.9 08/20/2020    LABALBU 4.4 08/20/2020    BILITOT 1.01 08/20/2020    ALKPHOS 86 08/20/2020    AST 24 08/20/2020    ALT 24 08/20/2020     Lab Results   Component Value Date    WBC 10.4 08/20/2020    RBC 5.17 08/20/2020    RBC 4.41 12/26/2011    HGB 15.2 08/20/2020    HCT 45.1 08/20/2020    MCV 87.3 08/20/2020    MCH 29.5 08/20/2020    MCHC 33.7 08/20/2020    RDW 13.9 08/20/2020     08/20/2020     12/26/2011    MPV NOT REPORTED 08/20/2020     Lab Results   Component Value Date    TSH 2.82 03/09/2015     Lab Results   Component Value Date    CHOL 92 08/20/2020    HDL 22 08/20/2020    LABA1C 5.6 07/29/2021          Assessment & Plan        Diagnosis Orders   1. Type 2 diabetes mellitus with diabetic polyneuropathy, without long-term current use of insulin ([facility])   --A1c=5.3% (previously 5.6%). Continue holding metformin. Continue diet modification. Try to do some regular exercise. pantoprazole (PROTONIX) 40 MG tablet    allopurinol (ZYLOPRIM) 300 MG tablet    POCT glycosylated hemoglobin (Hb A1C)   2. Gastroesophageal reflux disease without esophagitis  --continue PPI. Continue to avoid food triggers. Patient verbalizes understanding and agreement with plan. All questions answered. If symptoms do not resolve or worsen, return to office.                  Completed Refills   Requested Prescriptions Pending Prescriptions Disp Refills    pantoprazole (PROTONIX) 40 MG tablet 30 tablet 5     Sig: Take 1 tablet by mouth daily TAKE 1 TABLET BY MOUTH EVERY DAY    allopurinol (ZYLOPRIM) 300 MG tablet 30 tablet 5     Sig: Take 1 tablet by mouth daily TAKE 1 TABLET BY MOUTH EVERY DAY     No follow-ups on file. No orders of the defined types were placed in this encounter. No orders of the defined types were placed in this encounter. Patient Instructions           Electronically signed by J CARLOS De La Torre CNP on 11/1/2021 at 2:31 PM           Completed Refills      Requested Prescriptions     Pending Prescriptions Disp Refills    pantoprazole (PROTONIX) 40 MG tablet 30 tablet 5     Sig: Take 1 tablet by mouth daily TAKE 1 TABLET BY MOUTH EVERY DAY    allopurinol (ZYLOPRIM) 300 MG tablet 30 tablet 5     Sig: Take 1 tablet by mouth daily TAKE 1 TABLET BY MOUTH EVERY DAY         Jose Jauregui received counseling on the following healthy behaviors: medication adherence  Reviewed prior labs and health maintenance. Continue current medications, diet and exercise. Discussed use, benefit, and side effects of prescribed medications. Barriers to medication compliance addressed. Patient given educational materials - see patient instructions. All patient questions answered. Patient voiced understanding.

## 2021-11-15 DIAGNOSIS — M54.50 CHRONIC BILATERAL LOW BACK PAIN WITHOUT SCIATICA: ICD-10-CM

## 2021-11-15 DIAGNOSIS — G89.29 CHRONIC BILATERAL LOW BACK PAIN WITHOUT SCIATICA: ICD-10-CM

## 2021-11-15 RX ORDER — GABAPENTIN 800 MG/1
800 TABLET ORAL 3 TIMES DAILY
Qty: 90 TABLET | Refills: 3 | Status: SHIPPED | OUTPATIENT
Start: 2021-11-15 | End: 2022-02-07 | Stop reason: SDUPTHER

## 2021-11-15 NOTE — TELEPHONE ENCOUNTER
----- Message from Phillip Lugo sent at 11/15/2021  1:02 PM EST -----  Subject: Refill Request    QUESTIONS  Name of Medication? gabapentin (NEURONTIN) 800 MG tablet  Patient-reported dosage and instructions? 800 MG 3 times daily  How many days do you have left? 0  Preferred Pharmacy? [id number]  Pharmacy phone number (if available)? [phone]  ---------------------------------------------------------------------------  --------------  CALL BACK INFO  What is the best way for the office to contact you? OK to leave message on   voicemail  Preferred Call Back Phone Number?  [phone]

## 2021-11-22 ENCOUNTER — TELEPHONE (OUTPATIENT)
Dept: FAMILY MEDICINE CLINIC | Age: 46
End: 2021-11-22

## 2021-11-22 RX ORDER — ATORVASTATIN CALCIUM 80 MG/1
80 TABLET, FILM COATED ORAL DAILY
Qty: 90 TABLET | Refills: 1 | Status: SHIPPED | OUTPATIENT
Start: 2021-11-22 | End: 2022-05-16

## 2021-11-22 RX ORDER — TIZANIDINE 4 MG/1
4 TABLET ORAL NIGHTLY PRN
Qty: 90 TABLET | Refills: 1 | Status: SHIPPED | OUTPATIENT
Start: 2021-11-22 | End: 2022-05-16

## 2021-11-22 RX ORDER — LISINOPRIL 40 MG/1
40 TABLET ORAL DAILY
Qty: 90 TABLET | Refills: 1 | Status: SHIPPED | OUTPATIENT
Start: 2021-11-22 | End: 2022-05-16

## 2021-11-22 RX ORDER — CLONIDINE HYDROCHLORIDE 0.2 MG/1
0.2 TABLET ORAL 2 TIMES DAILY
Qty: 180 TABLET | Refills: 1 | Status: SHIPPED | OUTPATIENT
Start: 2021-11-22 | End: 2022-05-16

## 2021-11-22 NOTE — TELEPHONE ENCOUNTER
----- Message from Antonietta Hewitt sent at 11/22/2021 12:05 PM EST -----  Subject: Refill Request    QUESTIONS  Name of Medication? cloNIDine (CATAPRES) 0.2 MG tablet  Patient-reported dosage and instructions? 0.2 mg  How many days do you have left? 0  Preferred Pharmacy? [facility]  Pharmacy phone number (if available)? [phone]  ---------------------------------------------------------------------------  --------------,  Name of Medication? atorvastatin (LIPITOR) 80 MG tablet  Patient-reported dosage and instructions? 80 mg  How many days do you have left? 0  Preferred Pharmacy? [facility]  Pharmacy phone number (if available)? [phone]  ---------------------------------------------------------------------------  --------------,  Name of Medication? lisinopril (PRINIVIL;ZESTRIL) 40 MG tablet  Patient-reported dosage and instructions? 40 mg  How many days do you have left? Unknown  Preferred Pharmacy? [facility]  Pharmacy phone number (if available)? [phone]  ---------------------------------------------------------------------------  --------------,  Name of Medication? tiZANidine (ZANAFLEX) 4 MG tablet  Patient-reported dosage and instructions? 4 mg  How many days do you have left? 0  Preferred Pharmacy? [facility]  Pharmacy phone number (if available)? [phone]  ---------------------------------------------------------------------------  --------------  CALL BACK INFO  What is the best way for the office to contact you? OK to leave message on   voicemail  Preferred Call Back Phone Number?  [phone]

## 2021-12-03 ENCOUNTER — TELEPHONE (OUTPATIENT)
Dept: FAMILY MEDICINE CLINIC | Age: 46
End: 2021-12-03

## 2021-12-03 DIAGNOSIS — G89.29 CHRONIC BILATERAL LOW BACK PAIN WITHOUT SCIATICA: Primary | ICD-10-CM

## 2021-12-03 DIAGNOSIS — M54.50 CHRONIC BILATERAL LOW BACK PAIN WITHOUT SCIATICA: Primary | ICD-10-CM

## 2021-12-03 NOTE — TELEPHONE ENCOUNTER
Ace Miles said he has been dealing with sciatica pain. He said he has tried going to a chiropractor and it hasn't been helping. Hurts to sit/stand. Wanted to know if he could be referred to pain management or somewhere. He said the pain is excruciating.     Health Maintenance   Topic Date Due    Hepatitis A vaccine (1 of 2 - Risk 2-dose series) Never done    Pneumococcal 0-64 years Vaccine (1 of 2 - PPSV23) Never done    Hepatitis B vaccine (1 of 3 - Risk 3-dose series) Never done    Colon cancer screen colonoscopy  Never done    Lipid screen  08/20/2021    Potassium monitoring  08/20/2021    Creatinine monitoring  08/20/2021    Flu vaccine (1) Never done    COVID-19 Vaccine (3 - Booster for Pfizer series) 12/01/2021    Diabetic foot exam  01/21/2022    Diabetic microalbuminuria test  01/21/2022    A1C test (Diabetic or Prediabetic)  11/01/2022    Diabetic retinal exam  11/01/2022    DTaP/Tdap/Td vaccine (2 - Td or Tdap) 07/31/2026    HIV screen  Completed    Hib vaccine  Aged Out    Meningococcal (ACWY) vaccine  Aged Out             (applicable per patient's age: Cancer Screenings, Depression Screening, Fall Risk Screening, Immunizations)    Hemoglobin A1C (%)   Date Value   11/01/2021 5.3   07/29/2021 5.6   04/22/2021 5.5     LDL Cholesterol (mg/dL)   Date Value   08/20/2020 45     AST (U/L)   Date Value   08/20/2020 24     ALT (U/L)   Date Value   08/20/2020 24     BUN (mg/dL)   Date Value   08/20/2020 15      (goal A1C is < 7)   (goal LDL is <100) need 30-50% reduction from baseline     BP Readings from Last 3 Encounters:   11/01/21 110/80   07/29/21 106/74   04/22/21 104/60    (goal /80)      All Future Testing planned in [facility]:  Lab Frequency Next Occurrence   COVID-19 Ambulatory Once 01/15/2021       Next Visit Date:  Future Appointments   Date Time Provider Debbie Rosales   2/3/2022  1:20 PM Keo Zepeda, APRN - [facility]            Patient Active Problem List: HTN (hypertension)     Arthritis     Gout     Mass of right wrist     Gastroesophageal reflux disease without esophagitis     History of heroin abuse (HCC)     Chronic hepatitis C without hepatic coma ([facility] [address]     Morbidly obese (HCC)

## 2021-12-06 ENCOUNTER — TELEPHONE (OUTPATIENT)
Dept: FAMILY MEDICINE CLINIC | Age: 46
End: 2021-12-06

## 2021-12-10 ENCOUNTER — VIRTUAL VISIT (OUTPATIENT)
Dept: PAIN MANAGEMENT | Age: 46
End: 2021-12-10
Payer: MEDICARE

## 2021-12-10 DIAGNOSIS — M54.16 LUMBAR RADICULOPATHY: Primary | ICD-10-CM

## 2021-12-10 PROCEDURE — 99203 OFFICE O/P NEW LOW 30 MIN: CPT | Performed by: PHYSICAL MEDICINE & REHABILITATION

## 2021-12-10 PROCEDURE — G8484 FLU IMMUNIZE NO ADMIN: HCPCS | Performed by: PHYSICAL MEDICINE & REHABILITATION

## 2021-12-10 PROCEDURE — 4004F PT TOBACCO SCREEN RCVD TLK: CPT | Performed by: PHYSICAL MEDICINE & REHABILITATION

## 2021-12-10 PROCEDURE — G8417 CALC BMI ABV UP PARAM F/U: HCPCS | Performed by: PHYSICAL MEDICINE & REHABILITATION

## 2021-12-10 PROCEDURE — G8428 CUR MEDS NOT DOCUMENT: HCPCS | Performed by: PHYSICAL MEDICINE & REHABILITATION

## 2021-12-10 RX ORDER — METHYLPREDNISOLONE 4 MG/1
TABLET ORAL
Qty: 1 KIT | Refills: 0 | Status: SHIPPED | OUTPATIENT
Start: 2021-12-10 | End: 2021-12-16

## 2021-12-10 NOTE — PROGRESS NOTES
Pain Management Consultation    [address] PHYSICAL MEDICINE & REHABILITATION  [address] [facility] [address]  Dept: [facility], APRN [facility]  [address] [facility],  [address] [place] [address]       Zak Webb is a 55 y.o. male, who came today due to  back pain    Cause of the symptom(s): degenerative (arthritis)    Onset: chronic. History of L5-S1 disc herniation in 2015. His symptoms include burning pain going down the leg , worse with coughing and sneezing. Aggravating factors: worse with activity     Relieving factors: lying down and bending    Treatment done: chiropractic manipulation, anti-inflammatory medication, muscle relaxant and steroid      Current pain level: 7 on the scale of 0-10 ( 10 being worst)       Allergies   Allergen Reactions    Bee Venom Anaphylaxis     Other reaction(s): AOF    Penicillins        Social History     Socioeconomic History    Marital status:      Spouse name: Not on file    Number of children: Not on file    Years of education: Not on file    Highest education level: Not on file   Occupational History    Not on file   Tobacco Use    Smoking status: Current Every Day Smoker     Packs/day: 2.00     Types: Cigarettes    Smokeless tobacco: Never Used   Substance and Sexual Activity    Alcohol use: No    Drug use: No    Sexual activity: Not on file   Other Topics Concern    Not on file   Social History Narrative    Not on file     Social Determinants of Health     Financial Resource Strain: Low Risk     Difficulty of Paying Living Expenses: Not hard at all   Food Insecurity: No Food Insecurity    Worried About Running Out of Food in the Last Year: Never true    Sharda of Food in the Last Year: Never true   Transportation Needs:     Lack of Transportation (Medical): Not on file    Lack of Transportation (Non-Medical):  Not on file   Physical Activity:     Days of Exercise per Week: Not on file    Minutes of Exercise per Session: Not on file   Stress:     Feeling of Stress : Not on file   Social Connections:     Frequency of Communication with Friends and Family: Not on file    Frequency of Social Gatherings with Friends and Family: Not on file    Attends [employer] Services: Not on file    Active Member of [address] [place] [address] or Organizations: Not on file    Attends Club or Organization Meetings: Not on file    Marital Status: Not on file   Intimate Partner Violence:     Fear of Current or Ex-Partner: Not on file    Emotionally Abused: Not on file    Physically Abused: Not on file    Sexually Abused: Not on file   Housing Stability:     Unable to Pay for Housing in the Last Year: Not on file    Number of [place] in the Last Year: Not on file    Unstable Housing in the Last Year: Not on file       Past Medical History:   Diagnosis Date    Chicken pox     Chronic back pain     Gout     Headache(784.0)     Hepatitis B 03/30/2017    Core antibody IgG/IgM -- positive    Hepatitis C 03/30/2017    Viral RNA by PCR -- detected    Hernia     Hypertension     Osteoarthritis        No past surgical history on file. Prior to Visit Medications    Medication Sig Taking? Authorizing Provider   methylPREDNISolone (MEDROL DOSEPACK) 4 MG tablet Take by mouth. Yes Lyubov Montague MD   cloNIDine (CATAPRES) 0.2 MG tablet Take 1 tablet by mouth 2 times daily  J CARLOS Menendez CNP   atorvastatin (LIPITOR) 80 MG tablet Take 1 tablet by mouth daily  J CARLOS Menendez CNP   lisinopril (PRINIVIL;ZESTRIL) 40 MG tablet Take 1 tablet by mouth daily  J CARLOS Menendez CNP   tiZANidine (ZANAFLEX) 4 MG tablet Take 1 tablet by mouth nightly as needed (neck pain/ muscle spasms)  J CARLOS Menendez CNP   gabapentin (NEURONTIN) 800 MG tablet Take 1 tablet by mouth 3 times daily for 120 days.   J CARLOS Menendez CNP   pantoprazole (PROTONIX) 40 MG tablet Take 1 tablet by mouth daily TAKE 1 TABLET BY MOUTH EVERY DAY  J CARLOS Chiang CNP   allopurinol (ZYLOPRIM) 300 MG tablet Take 1 tablet by mouth daily TAKE 1 TABLET BY MOUTH EVERY DAY  J CARLOS Chiang CNP   hydroCHLOROthiazide (HYDRODIURIL) 25 MG tablet Take 1 tablet by mouth every morning  DO Bell Amador MISC Check blood sugar twice daily. Whatever brand covered by insurance. J CARLOS Jesus CNP   blood glucose monitor strips Check twice daily. Whatever brand covered by insurance. J CARLOS Jesus - CNP   metFORMIN (GLUCOPHAGE-XR) 500 MG extended release tablet Take 1 tablet by mouth daily (with breakfast)  Patient not taking: Reported on 11/1/2021  J CARLOS Jesus CNP   blood glucose monitor kit and supplies Check twice daily. Whatever brand covered by insurance  J CARLOS Jesus CNP       Family History   Problem Relation Age of Onset    Diabetes Mother     Diabetes Father     Heart Disease Brother        Review of Systems: All systems reviewed, all unremarkable other than HPI/subjective. No change since last visit. Denies  fever, chills, infection or non healing wound. Physical Exam  Pulmonary:      Effort: Pulmonary effort is normal.      Breath sounds: Normal breath sounds. Neurological:      Mental Status: He is alert and oriented to person, place, and time. Assessment and Plan:      Diagnosis Orders   1. Lumbar radiculopathy  DE NJX DX/THER SBST INTRLMNR LMBR/SAC W/IMG GDN    MRI LUMBAR SPINE WO CONTRAST       Tried chiropractic manipulation for 3 weeks. MRI in 2015 showed L5-S1 disc herniation, left sided herniation. Schedule injection in 2 weeks     Schedule ff up post injection virtually 2 weeks post op       Joel Jhaveri, was evaluated through a synchronous (real-time) audio-video encounter. The patient (or guardian if applicable) is aware that this is a billable service.  Verbal consent to proceed has been obtained within the past 12 months. The visit was conducted pursuant to the emergency declaration under the [address] [place] [address] and the [employer] General Act. Patient identification was verified, and a caregiver was present when appropriate. The patient was located in a state where the provider was credentialed to provide care. Total time spent for this encounter: 30 minutes     --Victorino Gross MD on 12/10/2021 at 8:19 AM    An electronic signature was used to authenticate this note.

## 2021-12-14 RX ORDER — HYDROXYZINE PAMOATE 25 MG/1
CAPSULE ORAL
Qty: 90 CAPSULE | Refills: 0 | Status: SHIPPED | OUTPATIENT
Start: 2021-12-14 | End: 2022-02-07 | Stop reason: SDUPTHER

## 2021-12-14 NOTE — PROGRESS NOTES
[facility]   Preadmission Testing    Name: Praveena Trejo  : 1975  Patient Phone: [phone] (home) [phone] (work)    Procedure: L5-S1 Interlaminar Epidural Injection  Date of Procedure:   Surgeon: Aislinn Arriaza MD    Ht:  6' 2\" (188 cm)  Wt: 250 lb (113.4 kg)  Wt method: Stated    Allergies: Allergies   Allergen Reactions    Bee Venom Anaphylaxis     Other reaction(s): AOF    Penicillins            Latex Allergy Screening Tool  Have you ever had a reaction to or been told by a physician that you have an allergy to latex or natural rubber?: No    There were no vitals filed for this visit. No LMP for male patient. Do you take blood thinners? [] Yes    [x] No         Instructed to stop blood thinners prior to procedure? [] Yes    [] No      [x] N/A   Do you have sleep apnea? [] Yes    [x] No     Do you have acid reflux ? [x] Yes    [] No     Have you had a respiratory infection or sore throat in last 4 weeks before surgery? [] Yes    [x] No     Do you have poorly controlled asthma or COPD? [] Yes    [x] No         Have you had an EKG in last 12 months? [] Yes    [x] No          Do you smoke? [x] Yes    [] No      Please refrain from smoking on the day of surgery. Patient instructed on: [x] NPO Status   [x] Meds to Take  [x] Ride Home  [x]No Jewelry/Contact Lenses/Nail Polish     DOS Patient Needs [] HCG   [x] Blood Sugar  [] PT/INR         COVID Vaccinated? [x] Yes    [] No           PAT Call/Visit Questions  Person Interviewed: Marella Galeazzi  Relationship to Patient: self  Medical History Reviewed: Yes  NPO Status Reinforced: Yes  Ride and Caregiver Arranged: Yes         Patient instructed on the pre-operative, intra-operative, and post-operative process? Yes  Medication instructions reviewed with patient?   Yes

## 2021-12-16 ENCOUNTER — HOSPITAL ENCOUNTER (OUTPATIENT)
Dept: PREADMISSION TESTING | Age: 46
Setting detail: SPECIMEN
Discharge: HOME | End: 2021-12-16
Payer: MEDICARE

## 2021-12-16 ENCOUNTER — HOSPITAL ENCOUNTER (OUTPATIENT)
Dept: MRI IMAGING | Age: 46
Discharge: HOME | End: 2021-12-18
Payer: MEDICARE

## 2021-12-16 DIAGNOSIS — M54.16 LUMBAR RADICULOPATHY: ICD-10-CM

## 2021-12-16 LAB
SARS-COV-2, RAPID: NOT DETECTED
SPECIMEN DESCRIPTION: NORMAL

## 2021-12-16 PROCEDURE — 72148 MRI LUMBAR SPINE W/O DYE: CPT

## 2021-12-16 PROCEDURE — C9803 HOPD COVID-19 SPEC COLLECT: HCPCS

## 2021-12-16 PROCEDURE — 87635 SARS-COV-2 COVID-19 AMP PRB: CPT

## 2021-12-17 ENCOUNTER — HOSPITAL ENCOUNTER (OUTPATIENT)
Age: 46
Setting detail: OUTPATIENT SURGERY
Discharge: HOME | End: 2021-12-17
Attending: PHYSICAL MEDICINE & REHABILITATION | Admitting: PHYSICAL MEDICINE & REHABILITATION
Payer: MEDICARE

## 2021-12-17 ENCOUNTER — APPOINTMENT (OUTPATIENT)
Dept: GENERAL RADIOLOGY | Age: 46
End: 2021-12-17
Attending: PHYSICAL MEDICINE & REHABILITATION
Payer: MEDICARE

## 2021-12-17 VITALS
BODY MASS INDEX: 34.55 KG/M2 | RESPIRATION RATE: 18 BRPM | OXYGEN SATURATION: 95 % | SYSTOLIC BLOOD PRESSURE: 124 MMHG | TEMPERATURE: 98.1 F | HEIGHT: 74 IN | WEIGHT: 269.2 LBS | HEART RATE: 77 BPM | DIASTOLIC BLOOD PRESSURE: 75 MMHG

## 2021-12-17 DIAGNOSIS — M54.16 LUMBAR RADICULOPATHY: Primary | ICD-10-CM

## 2021-12-17 LAB — GLUCOSE BLD-MCNC: 109 MG/DL (ref 65–99)

## 2021-12-17 PROCEDURE — 76000 FLUOROSCOPY <1 HR PHYS/QHP: CPT

## 2021-12-17 PROCEDURE — 7100000010 HC PHASE II RECOVERY - FIRST 15 MIN: Performed by: PHYSICAL MEDICINE & REHABILITATION

## 2021-12-17 PROCEDURE — 2709999900 HC NON-CHARGEABLE SUPPLY: Performed by: PHYSICAL MEDICINE & REHABILITATION

## 2021-12-17 PROCEDURE — 62323 NJX INTERLAMINAR LMBR/SAC: CPT | Performed by: PHYSICAL MEDICINE & REHABILITATION

## 2021-12-17 PROCEDURE — 2500000003 HC RX 250 WO HCPCS: Performed by: PHYSICAL MEDICINE & REHABILITATION

## 2021-12-17 PROCEDURE — 6360000002 HC RX W HCPCS: Performed by: PHYSICAL MEDICINE & REHABILITATION

## 2021-12-17 PROCEDURE — 99152 MOD SED SAME PHYS/QHP 5/>YRS: CPT | Performed by: PHYSICAL MEDICINE & REHABILITATION

## 2021-12-17 PROCEDURE — 82947 ASSAY GLUCOSE BLOOD QUANT: CPT

## 2021-12-17 PROCEDURE — 2580000003 HC RX 258: Performed by: PHYSICAL MEDICINE & REHABILITATION

## 2021-12-17 PROCEDURE — 3600000052 HC PAIN LEVEL 2 BASE: Performed by: PHYSICAL MEDICINE & REHABILITATION

## 2021-12-17 PROCEDURE — 7100000011 HC PHASE II RECOVERY - ADDTL 15 MIN: Performed by: PHYSICAL MEDICINE & REHABILITATION

## 2021-12-17 RX ORDER — TRAMADOL HYDROCHLORIDE 50 MG/1
50 TABLET ORAL EVERY 6 HOURS PRN
Qty: 20 TABLET | Refills: 0 | Status: SHIPPED | OUTPATIENT
Start: 2021-12-17 | End: 2021-12-22

## 2021-12-17 RX ORDER — CYCLOBENZAPRINE HCL 10 MG
10 TABLET ORAL 3 TIMES DAILY PRN
Qty: 21 TABLET | Refills: 0 | Status: SHIPPED | OUTPATIENT
Start: 2021-12-17 | End: 2021-12-27

## 2021-12-17 RX ORDER — FENTANYL CITRATE 50 UG/ML
INJECTION, SOLUTION INTRAMUSCULAR; INTRAVENOUS PRN
Status: DISCONTINUED | OUTPATIENT
Start: 2021-12-17 | End: 2021-12-17 | Stop reason: ALTCHOICE

## 2021-12-17 RX ORDER — LIDOCAINE HYDROCHLORIDE 10 MG/ML
INJECTION, SOLUTION INFILTRATION; PERINEURAL PRN
Status: DISCONTINUED | OUTPATIENT
Start: 2021-12-17 | End: 2021-12-17 | Stop reason: ALTCHOICE

## 2021-12-17 RX ORDER — BETAMETHASONE SODIUM PHOSPHATE AND BETAMETHASONE ACETATE 3; 3 MG/ML; MG/ML
INJECTION, SUSPENSION INTRA-ARTICULAR; INTRALESIONAL; INTRAMUSCULAR; SOFT TISSUE PRN
Status: DISCONTINUED | OUTPATIENT
Start: 2021-12-17 | End: 2021-12-17 | Stop reason: ALTCHOICE

## 2021-12-17 RX ORDER — SODIUM CHLORIDE, SODIUM LACTATE, POTASSIUM CHLORIDE, CALCIUM CHLORIDE 600; 310; 30; 20 MG/100ML; MG/100ML; MG/100ML; MG/100ML
INJECTION, SOLUTION INTRAVENOUS CONTINUOUS
Status: DISCONTINUED | OUTPATIENT
Start: 2021-12-17 | End: 2021-12-17 | Stop reason: HOSPADM

## 2021-12-17 RX ORDER — MIDAZOLAM HYDROCHLORIDE 1 MG/ML
INJECTION INTRAMUSCULAR; INTRAVENOUS PRN
Status: DISCONTINUED | OUTPATIENT
Start: 2021-12-17 | End: 2021-12-17 | Stop reason: ALTCHOICE

## 2021-12-17 RX ADMIN — SODIUM CHLORIDE, POTASSIUM CHLORIDE, SODIUM LACTATE AND CALCIUM CHLORIDE: 600; 310; 30; 20 INJECTION, SOLUTION INTRAVENOUS at 10:33

## 2021-12-17 ASSESSMENT — PAIN - FUNCTIONAL ASSESSMENT: PAIN_FUNCTIONAL_ASSESSMENT: 0-10

## 2021-12-17 NOTE — SEDATION DOCUMENTATION
Conscious Sedation Pre and Post Procedure Note    Indication: procedural pain management    Consent: I have discussed with the patient and/or the patient representative the indication, alternatives, and the possible risks and/or complications of the planned procedure and the anesthesia methods. The patient and/or patient representative appear to understand and agree to proceed. Physician Involvement: The attending physician was present and supervising this procedure. Pre-Sedation Documentation and Exam: I have personally completed a history, physical exam & review of systems for this patient (see notes). Airway Assessment: Mallampati Class I - (soft palate, fauces, uvula & anterior/posterior tonsillar pillars are visible)    Prior History of Anesthesia Complications: none    ASA Classification: Class 2 - A normal healthy patient with mild systemic disease    Sedation/ Anesthesia Plan: intravenous sedation    Medications Used: midazolam (Versed) intravenously and fentanyl intravenously    Monitoring and Safety: The patient was placed on a cardiac monitor and vital signs, pulse oximetry and level of consciousness were continuously evaluated throughout the procedure. The patient was closely monitored until recovery from the medications was complete and the patient had returned to baseline status. Respiratory therapy was on standby at all times during the procedure. (The following sections must be completed)    Post-Sedation Vital Signs: Vital signs were reviewed and were stable after the procedure (see flow sheet for vitals)            Post-Sedation Exam: Neurologically intact. No cardiovascular compromise post injection.               Complications: none

## 2021-12-17 NOTE — H&P
Abused: Not on file    Physically Abused: Not on file    Sexually Abused: Not on file   Housing Stability:     Unable to Pay for Housing in the Last Year: Not on file    Number of Places Lived in the Last Year: Not on file    Unstable Housing in the Last Year: Not on file       Past Medical History:   Diagnosis Date    Chicken pox     Chronic back pain     Diabetes mellitus ([facility] [address]     Gout     Headache(784.0)     Hepatitis B 03/30/2017    Core antibody IgG/IgM -- positive    Hepatitis C 03/30/2017    Viral RNA by PCR -- detected    Hernia     Hypertension     Osteoarthritis        Past Surgical History:   Procedure Laterality Date    HAND SURGERY Right     cyst removal       Prior to Visit Medications    Medication Sig Taking? Authorizing Provider   cloNIDine (CATAPRES) 0.2 MG tablet Take 1 tablet by mouth 2 times daily Yes J CARLOS Ortiz CNP   atorvastatin (LIPITOR) 80 MG tablet Take 1 tablet by mouth daily Yes J CARLOS Ortiz CNP   lisinopril (PRINIVIL;ZESTRIL) 40 MG tablet Take 1 tablet by mouth daily Yes J CARLOS Ortiz CNP   tiZANidine (ZANAFLEX) 4 MG tablet Take 1 tablet by mouth nightly as needed (neck pain/ muscle spasms) Yes J CARLOS Ortiz CNP   gabapentin (NEURONTIN) 800 MG tablet Take 1 tablet by mouth 3 times daily for 120 days.  Yes J CARLOS Ortiz CNP   pantoprazole (PROTONIX) 40 MG tablet Take 1 tablet by mouth daily TAKE 1 TABLET BY MOUTH EVERY DAY Yes J CARLOS Chiang CNP   allopurinol (ZYLOPRIM) 300 MG tablet Take 1 tablet by mouth daily TAKE 1 TABLET BY MOUTH EVERY DAY Yes J CARLOS Chiang CNP   hydroCHLOROthiazide (HYDRODIURIL) 25 MG tablet Take 1 tablet by mouth every morning Yes Charissa Farias DO   metFORMIN (GLUCOPHAGE-XR) 500 MG extended release tablet Take 1 tablet by mouth daily (with breakfast) Yes J CARLOS Ortiz CNP   hydrOXYzine (VISTARIL) 25 MG capsule TAKE 1 CAPSULE BY MOUTH THREE TIMES DAILY AS NEEDED FOR ANXIETY  J CARLOS Mac CNP   Lancets MISC Check blood sugar twice daily. Whatever brand covered by insurance. J CARLOS Mac CNP   blood glucose monitor strips Check twice daily. Whatever brand covered by insurance. JC ARLOS Mac CNP   blood glucose monitor kit and supplies Check twice daily. Whatever brand covered by insurance  J CARLOS Mac CNP       Family History   Problem Relation Age of Onset    Diabetes Mother     Diabetes Father     Heart Disease Brother          Review of Systems : All systems reviewed, all unremarkable other than HPI. No change since last visit. Physical Exam  HENT:      Head: Normocephalic. Cardiovascular:      Rate and Rhythm: Normal rate. Pulses: Normal pulses. Pulmonary:      Effort: Pulmonary effort is normal. No respiratory distress. Breath sounds: Normal breath sounds. Neurological:      General: No focal deficit present. Mental Status: He is alert and oriented to person, place, and time. Psychiatric:         Behavior: Behavior normal.         Cervical Spine Exam: Full ROM, without limitation     Access: Adequate mouth opening       Assessment:   Lumbar spondylosis     PLAN:     As advertised. Planned duration of treatment: 4 weeks. Further assessment and followup in  4 weeks for follow up post injection.        Electronically signed by Fidel Keith MD on 12/17/2021 at 11:08 AM

## 2021-12-17 NOTE — OP NOTE
Operative Note      Patient: Maxwell Oropeza  YOB: 1975  MRN: [id number]    Date of Procedure: 12/17/2021    Pre-Op Diagnosis: LUMBAR RADICULOPATHY    Post-Op Diagnosis: Same       Procedure(s):  L5-S1 INTERLAMINAR EPIDURAL INJECTION    Surgeon(s):  Aly Walton MD    Assistant:   * No surgical staff found *    Anesthesia: IV Sedation    Estimated Blood Loss (mL): Minimal    Complications: None    CONSENT:     The risks, benefits, alternatives, plan, complications, and personnel were   Discussed prior to the procedure. The patient understood and agreed to proceed. Anesthesia: Moderate sedation using 2 mg IV versed & 100 mcg IV fentanyl. The patient was positioned prone. Sign-in and time-out was performed. Identifying the   site, in this case, left side lumbar paramedian, at  L5-S1 interspace. Sterile technique was done in the usual manner using chlorhexidine. This was followed by infiltration of a 20 ml of 1% preservative-free lidocaine. A 4.5 inch  22-gauge [place] needle was positioned under fluoroscopic guidance. Upon confirmation that the needle was properly positioned using loss-of-resistance technique, 0.5 cc of 240 mg of Omnipaque was injected. This was followed by injecting solution of 6 mg betamethasone and  4ml of 1% preservative-free lidocaine was injected without difficulty. The patient tolerated the procedure well and went to the recovery room with stable vital signs. Moderate Sedation Time: Less than  15 minutes with a nurse administering the medication(s) under my supervision. The patient was independently monitored by a Registered Nurse assigned to the Procedure Room ( automated blood pressure, continuous EKG, Capnography and continuous pulse oximetry)      PLAN:     Home instructions were provided. The patient will follow up in 4 to 6 weeks or earlier if needed.        Electronically signed by Aly Walton MD on 12/17/2021 at 11:10 AM

## 2021-12-17 NOTE — PROGRESS NOTES

## 2022-02-07 ENCOUNTER — OFFICE VISIT (OUTPATIENT)
Dept: FAMILY MEDICINE CLINIC | Age: 47
End: 2022-02-07
Payer: MEDICARE

## 2022-02-07 VITALS
OXYGEN SATURATION: 98 % | HEART RATE: 70 BPM | TEMPERATURE: 98 F | WEIGHT: 276 LBS | SYSTOLIC BLOOD PRESSURE: 108 MMHG | BODY MASS INDEX: 35.44 KG/M2 | DIASTOLIC BLOOD PRESSURE: 64 MMHG

## 2022-02-07 DIAGNOSIS — F41.1 GAD (GENERALIZED ANXIETY DISORDER): ICD-10-CM

## 2022-02-07 DIAGNOSIS — M54.50 CHRONIC BILATERAL LOW BACK PAIN WITHOUT SCIATICA: ICD-10-CM

## 2022-02-07 DIAGNOSIS — M79.672 PAIN IN BOTH FEET: ICD-10-CM

## 2022-02-07 DIAGNOSIS — M1A.09X0 IDIOPATHIC CHRONIC GOUT OF MULTIPLE SITES WITHOUT TOPHUS: ICD-10-CM

## 2022-02-07 DIAGNOSIS — M79.671 PAIN IN BOTH FEET: ICD-10-CM

## 2022-02-07 DIAGNOSIS — E11.42 TYPE 2 DIABETES MELLITUS WITH DIABETIC POLYNEUROPATHY, WITHOUT LONG-TERM CURRENT USE OF INSULIN (HCC): ICD-10-CM

## 2022-02-07 DIAGNOSIS — K21.9 GASTROESOPHAGEAL REFLUX DISEASE WITHOUT ESOPHAGITIS: ICD-10-CM

## 2022-02-07 DIAGNOSIS — I10 PRIMARY HYPERTENSION: Primary | ICD-10-CM

## 2022-02-07 DIAGNOSIS — G89.29 CHRONIC BILATERAL LOW BACK PAIN WITHOUT SCIATICA: ICD-10-CM

## 2022-02-07 LAB — HBA1C MFR BLD: 5.8 %

## 2022-02-07 PROCEDURE — G8427 DOCREV CUR MEDS BY ELIG CLIN: HCPCS | Performed by: NURSE PRACTITIONER

## 2022-02-07 PROCEDURE — 2022F DILAT RTA XM EVC RTNOPTHY: CPT | Performed by: NURSE PRACTITIONER

## 2022-02-07 PROCEDURE — 4004F PT TOBACCO SCREEN RCVD TLK: CPT | Performed by: NURSE PRACTITIONER

## 2022-02-07 PROCEDURE — G8484 FLU IMMUNIZE NO ADMIN: HCPCS | Performed by: NURSE PRACTITIONER

## 2022-02-07 PROCEDURE — G8417 CALC BMI ABV UP PARAM F/U: HCPCS | Performed by: NURSE PRACTITIONER

## 2022-02-07 PROCEDURE — 3044F HG A1C LEVEL LT 7.0%: CPT | Performed by: NURSE PRACTITIONER

## 2022-02-07 PROCEDURE — 99214 OFFICE O/P EST MOD 30 MIN: CPT | Performed by: NURSE PRACTITIONER

## 2022-02-07 PROCEDURE — 83036 HEMOGLOBIN GLYCOSYLATED A1C: CPT | Performed by: NURSE PRACTITIONER

## 2022-02-07 RX ORDER — PANTOPRAZOLE SODIUM 40 MG/1
40 TABLET, DELAYED RELEASE ORAL DAILY
Qty: 30 TABLET | Refills: 5 | Status: SHIPPED | OUTPATIENT
Start: 2022-02-07 | End: 2022-10-20 | Stop reason: SDUPTHER

## 2022-02-07 RX ORDER — GABAPENTIN 800 MG/1
800 TABLET ORAL 3 TIMES DAILY
Qty: 90 TABLET | Refills: 3 | Status: SHIPPED | OUTPATIENT
Start: 2022-02-07 | End: 2022-05-16 | Stop reason: SDUPTHER

## 2022-02-07 RX ORDER — HYDROXYZINE PAMOATE 25 MG/1
25 CAPSULE ORAL 3 TIMES DAILY PRN
Qty: 90 CAPSULE | Refills: 2 | Status: SHIPPED | OUTPATIENT
Start: 2022-02-07 | End: 2022-05-16

## 2022-02-07 RX ORDER — ALLOPURINOL 300 MG/1
300 TABLET ORAL DAILY
Qty: 30 TABLET | Refills: 5 | Status: SHIPPED | OUTPATIENT
Start: 2022-02-07 | End: 2022-10-20 | Stop reason: SDUPTHER

## 2022-02-07 RX ORDER — HYDROCHLOROTHIAZIDE 25 MG/1
25 TABLET ORAL EVERY MORNING
Qty: 90 TABLET | Refills: 1 | Status: SHIPPED | OUTPATIENT
Start: 2022-02-07 | End: 2022-07-14

## 2022-02-07 ASSESSMENT — PATIENT HEALTH QUESTIONNAIRE - PHQ9
SUM OF ALL RESPONSES TO PHQ QUESTIONS 1-9: 0
2. FEELING DOWN, DEPRESSED OR HOPELESS: 0
SUM OF ALL RESPONSES TO PHQ QUESTIONS 1-9: 0
1. LITTLE INTEREST OR PLEASURE IN DOING THINGS: 0
SUM OF ALL RESPONSES TO PHQ9 QUESTIONS 1 & 2: 0

## 2022-02-07 ASSESSMENT — ENCOUNTER SYMPTOMS
VOMITING: 0
DIARRHEA: 0
HEARTBURN: 0
SHORTNESS OF BREATH: 0
GLOBUS SENSATION: 0
COUGH: 0
NAUSEA: 0
BACK PAIN: 1

## 2022-02-07 NOTE — PROGRESS NOTES
HPI Notes    Name: Samir Salmon  : 1975         Chief Complaint:     Chief Complaint   Patient presents with    Hypertension     Patient here today for check up    Diabetes Mellitus     Last A1C was 5.3 in     Mental Health Problem    Gout       History of Present Illness:        Hypertension  This is a chronic problem. The current episode started more than 1 year ago. The problem is controlled. Pertinent negatives include no chest pain, headaches, palpitations or shortness of breath. Risk factors for coronary artery disease include diabetes mellitus. Past treatments include central alpha agonists and ACE inhibitors. The current treatment provides moderate improvement. There are no compliance problems. Mental Health Problem  The primary symptoms include dysphoric mood. The current episode started more than 1 month ago. This is a chronic problem. The onset of the illness is precipitated by emotional stress. The degree of incapacity that he is experiencing as a consequence of his illness is mild. Additional symptoms of the illness do not include insomnia, headaches or seizures. He does not admit to suicidal ideas. He does not have a plan to attempt suicide. He does not contemplate harming himself. He has not already injured self. He does not contemplate injuring another person. He has not already  injured another person. Diabetes  He presents for his follow-up diabetic visit. He has type 2 diabetes mellitus. His disease course has been stable. There are no hypoglycemic associated symptoms. Pertinent negatives for hypoglycemia include no dizziness, headaches or seizures. Pertinent negatives for diabetes include no chest pain. Risk factors for coronary artery disease include diabetes mellitus, hypertension, male sex and obesity. Current diabetic treatment includes diet. He is compliant with treatment all of the time. He is following a generally healthy diet.    Gastroesophageal Reflux  He reports no chest pain, no coughing, no globus sensation, no heartburn or no nausea. This is a chronic problem. The problem occurs rarely. The symptoms are aggravated by certain foods. Risk factors include caffeine use, lack of exercise, obesity and smoking/tobacco exposure. He has tried a PPI for the symptoms. The treatment provided moderate relief. Gout  Pt has had bout in his ankles before. Takes allopurinol regularly. Past Medical History:     Past Medical History:   Diagnosis Date    Chicken pox     Chronic back pain     Diabetes mellitus ([address]     Gout     Headache(784.0)     Hepatitis B 03/30/2017    Core antibody IgG/IgM -- positive    Hepatitis C 03/30/2017    Viral RNA by PCR -- detected    Hernia     Hypertension     Osteoarthritis       Reviewed all health maintenance requirements and ordered appropriate tests  Health Maintenance Due   Topic Date Due    Hepatitis A vaccine (1 of 2 - Risk 2-dose series) Never done    Pneumococcal 0-64 years Vaccine (1 of 2 - PPSV23) Never done    Hepatitis B vaccine (1 of 3 - Risk 3-dose series) Never done    Colon cancer screen colonoscopy  Never done    Lipid screen  08/20/2021    Potassium monitoring  08/20/2021    Creatinine monitoring  08/20/2021    Flu vaccine (1) Never done    COVID-19 Vaccine (3 - Booster for Pfizer series) 11/01/2021    Diabetic microalbuminuria test  01/21/2022       Past Surgical History:     Past Surgical History:   Procedure Laterality Date    HAND SURGERY Right     cyst removal    PAIN MANAGEMENT PROCEDURE N/A 12/17/2021    L5-S1 INTERLAMINAR EPIDURAL INJECTION performed by Juanito Locke MD at [facility] OR        Medications:       Prior to Admission medications    Medication Sig Start Date End Date Taking?  Authorizing Provider   hydroCHLOROthiazide (HYDRODIURIL) 25 MG tablet Take 1 tablet by mouth every morning 2/7/22  Yes Frances Ibrahim APRN - CNP   hydrOXYzine (VISTARIL) 25 MG capsule Take 1 capsule by mouth 3 times daily as needed for Anxiety 2/7/22  Yes J CARLOS Johnson CNP   allopurinol (ZYLOPRIM) 300 MG tablet Take 1 tablet by mouth daily TAKE 1 TABLET BY MOUTH EVERY DAY 2/7/22  Yes J CARLOS Johnson CNP   pantoprazole (PROTONIX) 40 MG tablet Take 1 tablet by mouth daily TAKE 1 TABLET BY MOUTH EVERY DAY 2/7/22  Yes J CARLOS Johnson CNP   gabapentin (NEURONTIN) 800 MG tablet Take 1 tablet by mouth 3 times daily for 120 days. 2/7/22 6/7/22 Yes J CARLOS Johnson CNP   cloNIDine (CATAPRES) 0.2 MG tablet Take 1 tablet by mouth 2 times daily 11/22/21  Yes J CARLOS Johnson CNP   atorvastatin (LIPITOR) 80 MG tablet Take 1 tablet by mouth daily 11/22/21  Yes J CARLOS Johnson CNP   lisinopril (PRINIVIL;ZESTRIL) 40 MG tablet Take 1 tablet by mouth daily 11/22/21  Yes J CARLOS Johnson CNP   tiZANidine (ZANAFLEX) 4 MG tablet Take 1 tablet by mouth nightly as needed (neck pain/ muscle spasms) 11/22/21  Yes J CARLOS Johnson CNP   Lancets MISC Check blood sugar twice daily. Whatever brand covered by insurance. 7/29/21  Yes J CARLOS Johnson CNP   blood glucose monitor strips Check twice daily. Whatever brand covered by insurance. 7/29/21  Yes J CARLOS Johnson CNP   blood glucose monitor kit and supplies Check twice daily. Whatever brand covered by insurance 1/21/21  Yes J CARLOS Johnson CNP        Allergies:       Bee venom and Penicillins    Social History:     Tobacco:    reports that he has been smoking cigarettes. He has been smoking about 1.00 pack per day. He has never used smokeless tobacco.  Alcohol:      reports no history of alcohol use. Drug Use:  reports no history of drug use.     Family History:        Family History   Problem Relation Age of Onset    Diabetes Mother     Diabetes Father     Heart Disease Brother        Review of Systems:         Review of Systems   Constitutional: Negative for chills and fever.   Respiratory: Negative for cough and shortness of breath. Cardiovascular: Negative for chest pain and palpitations. Gastrointestinal: Negative for diarrhea, heartburn, nausea and vomiting. Musculoskeletal: Positive for back pain and joint swelling. Neurological: Negative for dizziness, seizures and headaches. Psychiatric/Behavioral: Positive for dysphoric mood. The patient does not have insomnia. Physical Exam:     Vitals:  /64   Pulse 70   Temp 98 °F (36.7 °C) (Oral)   Wt 276 lb (125.2 kg)   SpO2 98%   BMI 35.44 kg/m²       Physical Exam  Vitals and nursing note reviewed. Constitutional:       Appearance: He is well-developed. Cardiovascular:      Rate and Rhythm: Normal rate and regular rhythm. Heart sounds: S1 normal and S2 normal.   Pulmonary:      Effort: Pulmonary effort is normal. No respiratory distress. Breath sounds: Normal breath sounds. Abdominal:      General: Bowel sounds are normal.      Palpations: Abdomen is soft. Tenderness: There is no abdominal tenderness. Feet:      Right foot:      Protective Sensation: 8 sites tested. 8 sites sensed. Skin integrity: Erythema (lateral 5th toe) present. Toenail Condition: Right toenails are normal.      Left foot:      Protective Sensation: 8 sites tested. 8 sites sensed. Skin integrity: Erythema (lateral 5th toe) present. Toenail Condition: Left toenails are normal.   Skin:     General: Skin is warm and dry. Psychiatric:         Behavior: Behavior is cooperative.                Data:     Lab Results   Component Value Date     08/20/2020    K 4.1 08/20/2020     08/20/2020    CO2 28 08/20/2020    BUN 15 08/20/2020    CREATININE 0.86 08/20/2020    GLUCOSE 142 08/20/2020    GLUCOSE 127 12/26/2011    PROT 7.9 08/20/2020    LABALBU 4.4 08/20/2020    BILITOT 1.01 08/20/2020    ALKPHOS 86 08/20/2020    AST 24 08/20/2020    ALT 24 08/20/2020     Lab Results   Component Value Date    WBC 10.4 08/20/2020    RBC 5.17 08/20/2020    RBC 4.41 12/26/2011    HGB 15.2 08/20/2020    HCT 45.1 08/20/2020    MCV 87.3 08/20/2020    MCH 29.5 08/20/2020    MCHC 33.7 08/20/2020    RDW 13.9 08/20/2020     08/20/2020     12/26/2011    MPV NOT REPORTED 08/20/2020     Lab Results   Component Value Date    TSH 2.82 03/09/2015     Lab Results   Component Value Date    CHOL 92 08/20/2020    HDL 22 08/20/2020    LABA1C 5.8 02/07/2022          Assessment & Plan        Diagnosis Orders   1. Primary hypertension  --BP well controlled. Continue same medications    2. ANSLEY (generalized anxiety disorder)  --Continue with as needed Vistaril. 3. Type 2 diabetes mellitus with diabetic polyneuropathy, without long-term current use of insulin ([facility])   --A1c 5.8% today (previously 5.3%). Continue diet control. POCT glycosylated hemoglobin (Hb A1C)     DIABETES FOOT EXAM    allopurinol (ZYLOPRIM) 300 MG tablet    pantoprazole (PROTONIX) 40 MG tablet    Crow Saldivar DPM, Podiatry, [facility]   4. Gastroesophageal reflux disease without esophagitis  --Continue PPI and diet modification. 5. Idiopathic chronic gout of multiple sites without tophus   --Continue allopurinol regularly    6. Chronic bilateral low back pain without sciatica   --Continue with gabapentin. Continue with pain management. gabapentin (NEURONTIN) 800 MG tablet   7. Pain in both feet   --Patient having some erythema to bilateral fifth toes. Most likely his shoes are not fitting correctly. Will send patient to podiatry for evaluation and possible insoles. Crow Saldivar DPM, Podiatry, [facility]       Patient verbalizes understanding and agreement with plan. All questions answered. If symptoms do not resolve or worsen, return to office.            Completed Refills   Requested Prescriptions     Signed Prescriptions Disp Refills    hydroCHLOROthiazide (HYDRODIURIL) 25 MG tablet 90 tablet 1     Sig: Take 1 tablet by mouth every morning    hydrOXYzine (VISTARIL) 25 MG capsule 90 capsule 2     Sig: Take 1 capsule by mouth 3 times daily as needed for Anxiety    allopurinol (ZYLOPRIM) 300 MG tablet 30 tablet 5     Sig: Take 1 tablet by mouth daily TAKE 1 TABLET BY MOUTH EVERY DAY    pantoprazole (PROTONIX) 40 MG tablet 30 tablet 5     Sig: Take 1 tablet by mouth daily TAKE 1 TABLET BY MOUTH EVERY DAY    gabapentin (NEURONTIN) 800 MG tablet 90 tablet 3     Sig: Take 1 tablet by mouth 3 times daily for 120 days. No follow-ups on file. Orders Placed This Encounter   Medications    hydroCHLOROthiazide (HYDRODIURIL) 25 MG tablet     Sig: Take 1 tablet by mouth every morning     Dispense:  90 tablet     Refill:  1    hydrOXYzine (VISTARIL) 25 MG capsule     Sig: Take 1 capsule by mouth 3 times daily as needed for Anxiety     Dispense:  90 capsule     Refill:  2    allopurinol (ZYLOPRIM) 300 MG tablet     Sig: Take 1 tablet by mouth daily TAKE 1 TABLET BY MOUTH EVERY DAY     Dispense:  30 tablet     Refill:  5    pantoprazole (PROTONIX) 40 MG tablet     Sig: Take 1 tablet by mouth daily TAKE 1 TABLET BY MOUTH EVERY DAY     Dispense:  30 tablet     Refill:  5    gabapentin (NEURONTIN) 800 MG tablet     Sig: Take 1 tablet by mouth 3 times daily for 120 days. Dispense:  90 tablet     Refill:  3     Orders Placed This Encounter   Procedures   Amanda Heart DPM, Podiatry, [facility]     Referral Priority:   Routine     Referral Type:   Eval and Treat     Referral Reason:   Specialty Services Required     Referred to Provider:   Dio Vaughn DPM     Requested Specialty:   Podiatry     Number of Visits Requested:   1    POCT glycosylated hemoglobin (Hb A1C)     DIABETES FOOT EXAM         Patient Instructions   SURVEY:    You may be receiving a survey from [employer] regarding your visit today. Please complete the survey to enable us to provide the highest quality of care to you and your family.     If you

## 2022-05-16 ENCOUNTER — OFFICE VISIT (OUTPATIENT)
Dept: FAMILY MEDICINE CLINIC | Age: 47
End: 2022-05-16
Payer: MEDICARE

## 2022-05-16 VITALS
DIASTOLIC BLOOD PRESSURE: 80 MMHG | OXYGEN SATURATION: 95 % | TEMPERATURE: 98 F | WEIGHT: 285 LBS | BODY MASS INDEX: 36.59 KG/M2 | HEART RATE: 88 BPM | SYSTOLIC BLOOD PRESSURE: 112 MMHG

## 2022-05-16 DIAGNOSIS — E11.42 TYPE 2 DIABETES MELLITUS WITH DIABETIC POLYNEUROPATHY, WITHOUT LONG-TERM CURRENT USE OF INSULIN (HCC): Primary | ICD-10-CM

## 2022-05-16 LAB
CREATININE URINE POCT: NORMAL
HBA1C MFR BLD: 6.2 %
MICROALBUMIN/CREAT 24H UR: NORMAL MG/G{CREAT}
MICROALBUMIN/CREAT UR-RTO: NORMAL

## 2022-05-16 PROCEDURE — 99213 OFFICE O/P EST LOW 20 MIN: CPT | Performed by: NURSE PRACTITIONER

## 2022-05-16 PROCEDURE — G8427 DOCREV CUR MEDS BY ELIG CLIN: HCPCS | Performed by: NURSE PRACTITIONER

## 2022-05-16 PROCEDURE — 4004F PT TOBACCO SCREEN RCVD TLK: CPT | Performed by: NURSE PRACTITIONER

## 2022-05-16 PROCEDURE — 83036 HEMOGLOBIN GLYCOSYLATED A1C: CPT | Performed by: NURSE PRACTITIONER

## 2022-05-16 PROCEDURE — 3044F HG A1C LEVEL LT 7.0%: CPT | Performed by: NURSE PRACTITIONER

## 2022-05-16 PROCEDURE — 82044 UR ALBUMIN SEMIQUANTITATIVE: CPT | Performed by: NURSE PRACTITIONER

## 2022-05-16 PROCEDURE — G8417 CALC BMI ABV UP PARAM F/U: HCPCS | Performed by: NURSE PRACTITIONER

## 2022-05-16 PROCEDURE — 2022F DILAT RTA XM EVC RTNOPTHY: CPT | Performed by: NURSE PRACTITIONER

## 2022-05-16 RX ORDER — GABAPENTIN 800 MG/1
800 TABLET ORAL 3 TIMES DAILY
Qty: 90 TABLET | Refills: 3 | Status: SHIPPED | OUTPATIENT
Start: 2022-05-16 | End: 2022-07-19 | Stop reason: SDUPTHER

## 2022-05-16 RX ORDER — LISINOPRIL 40 MG/1
40 TABLET ORAL DAILY
Qty: 90 TABLET | Refills: 1 | Status: SHIPPED | OUTPATIENT
Start: 2022-05-16

## 2022-05-16 RX ORDER — CLONIDINE HYDROCHLORIDE 0.2 MG/1
TABLET ORAL
Qty: 180 TABLET | Refills: 1 | Status: SHIPPED | OUTPATIENT
Start: 2022-05-16

## 2022-05-16 RX ORDER — HYDROXYZINE PAMOATE 25 MG/1
CAPSULE ORAL
Qty: 90 CAPSULE | Refills: 1 | Status: SHIPPED | OUTPATIENT
Start: 2022-05-16 | End: 2022-07-14

## 2022-05-16 RX ORDER — TIZANIDINE 4 MG/1
TABLET ORAL
Qty: 90 TABLET | Refills: 1 | Status: SHIPPED | OUTPATIENT
Start: 2022-05-16

## 2022-05-16 RX ORDER — ATORVASTATIN CALCIUM 80 MG/1
80 TABLET, FILM COATED ORAL DAILY
Qty: 90 TABLET | Refills: 1 | Status: SHIPPED | OUTPATIENT
Start: 2022-05-16

## 2022-05-16 ASSESSMENT — ENCOUNTER SYMPTOMS
NAUSEA: 0
SHORTNESS OF BREATH: 0
VOMITING: 0
DIARRHEA: 0
COUGH: 0

## 2022-05-16 NOTE — TELEPHONE ENCOUNTER
Last OV: 2/7/2022 chronic  Last RX:    Next scheduled apt: 5/16/2022  3 month f/u      surescript requesting a refill

## 2022-05-16 NOTE — PATIENT INSTRUCTIONS
SURVEY:    You may be receiving a survey from [employer] regarding your visit today. Please complete the survey to enable us to provide the highest quality of care to you and your family. If you cannot score us a very good (5 Stars) on any question, please call the office to discuss how we could have made your experience a very good one. Thank you.     Clinical Care Team: J CARLOS Rea-DAWSON Sorenson LPN    Clerical Team: Ene Evans

## 2022-05-16 NOTE — PROGRESS NOTES
HPI Notes    Name: Jean-Pierre Vivas  : 1975         Chief Complaint:     Chief Complaint   Patient presents with    Diabetes Mellitus     3 month follow up. Last A1C was 5.8 in        History of Present Illness:        Diabetes  He presents for his follow-up diabetic visit. He has type 2 diabetes mellitus. His disease course has been stable. There are no hypoglycemic associated symptoms. Pertinent negatives for hypoglycemia include no dizziness, headaches or seizures. Pertinent negatives for diabetes include no chest pain and no fatigue. There are no hypoglycemic complications. Symptoms are stable. Risk factors for coronary artery disease include diabetes mellitus, male sex, obesity and tobacco exposure. Current diabetic treatment includes diet. He is following a generally unhealthy diet.        Past Medical History:     Past Medical History:   Diagnosis Date    Chicken pox     Chronic back pain     Diabetes mellitus ([address]     Gout     Headache(784.0)     Hepatitis B 2017    Core antibody IgG/IgM -- positive    Hepatitis C 2017    Viral RNA by PCR -- detected    Hernia     Hypertension     Osteoarthritis       Reviewed all health maintenance requirements and ordered appropriate tests  Health Maintenance Due   Topic Date Due    Hepatitis A vaccine (1 of 2 - Risk 2-dose series) Never done    Pneumococcal 0-64 years Vaccine (1 - PCV) Never done    Hepatitis B vaccine (1 of 3 - Risk 3-dose series) Never done    Colorectal Cancer Screen  Never done    Lipids  2021    COVID-19 Vaccine (3 - Booster for Pfizer series) 2021    Diabetic microalbuminuria test  2022       Past Surgical History:     Past Surgical History:   Procedure Laterality Date    HAND SURGERY Right     cyst removal    PAIN MANAGEMENT PROCEDURE N/A 2021    L5-S1 INTERLAMINAR EPIDURAL INJECTION performed by Jeanine Cross MD at [facility] OR        Medications:       Prior to Admission medications    Medication Sig Start Date End Date Taking? Authorizing Provider   atorvastatin (LIPITOR) 80 MG tablet TAKE 1 TABLET BY MOUTH DAILY 5/16/22  Yes J CARLOS Woods CNP   tiZANidine (ZANAFLEX) 4 MG tablet TAKE 1 TABLET BY MOUTH NIGHTLY AS NEEDED for neck pain or FOR MUSCLE SPASMS 5/16/22  Yes J CARLOS Woods CNP   cloNIDine (CATAPRES) 0.2 MG tablet TAKE 1 TABLET BY MOUTH TWICE DAILY 5/16/22  Yes J CARLOS Woods CNP   lisinopril (PRINIVIL;ZESTRIL) 40 MG tablet TAKE 1 TABLET BY MOUTH DAILY 5/16/22  Yes J CARLOS Woods CNP   hydrOXYzine (VISTARIL) 25 MG capsule TAKE 1 CAPSULE BY MOUTH THREE TIMES DAILY AS NEEDED FOR ANXIETY 5/16/22  Yes J CARLOS Woods CNP   hydroCHLOROthiazide (HYDRODIURIL) 25 MG tablet Take 1 tablet by mouth every morning 2/7/22  Yes J CARLOS Woods CNP   allopurinol (ZYLOPRIM) 300 MG tablet Take 1 tablet by mouth daily TAKE 1 TABLET BY MOUTH EVERY DAY 2/7/22  Yes J CARLOS Woods CNP   pantoprazole (PROTONIX) 40 MG tablet Take 1 tablet by mouth daily TAKE 1 TABLET BY MOUTH EVERY DAY 2/7/22  Yes J CARLOS Woods CNP   gabapentin (NEURONTIN) 800 MG tablet Take 1 tablet by mouth 3 times daily for 120 days. 2/7/22 6/7/22 Yes J CARLOS Woods CNP   Lancets MISC Check blood sugar twice daily. Whatever brand covered by insurance. 7/29/21  Yes J CARLOS Woods CNP   blood glucose monitor strips Check twice daily. Whatever brand covered by insurance. 7/29/21  Yes J CARLOS Woods CNP   blood glucose monitor kit and supplies Check twice daily. Whatever brand covered by insurance 1/21/21  Yes J CARLOS Woods CNP        Allergies:       Bee venom and Penicillins    Social History:     Tobacco:    reports that he has been smoking cigarettes. He has been smoking about 1.00 pack per day. He has never used smokeless tobacco.  Alcohol:      reports no history of alcohol use.   Drug Use:  reports no history of drug use. Family History:     Family History   Problem Relation Age of Onset    Diabetes Mother     Diabetes Father     Heart Disease Brother        Review of Systems:         Review of Systems   Constitutional: Negative for chills, fatigue and fever. Respiratory: Negative for cough and shortness of breath. Cardiovascular: Negative for chest pain and palpitations. Gastrointestinal: Negative for diarrhea, nausea and vomiting. Neurological: Negative for dizziness, seizures and headaches. Physical Exam:     Vitals:  /80   Pulse 88   Temp 98 °F (36.7 °C) (Oral)   Wt 285 lb (129.3 kg)   SpO2 95%   BMI 36.59 kg/m²       Physical Exam  Vitals and nursing note reviewed. Constitutional:       Appearance: He is well-developed. Cardiovascular:      Rate and Rhythm: Normal rate and regular rhythm. Heart sounds: S1 normal and S2 normal.   Pulmonary:      Effort: Pulmonary effort is normal. No respiratory distress. Breath sounds: Normal breath sounds. Abdominal:      General: Bowel sounds are normal.      Palpations: Abdomen is soft. Tenderness: There is no abdominal tenderness. Skin:     General: Skin is warm and dry. Psychiatric:         Behavior: Behavior is cooperative.                Data:     Lab Results   Component Value Date     08/20/2020    K 4.1 08/20/2020     08/20/2020    CO2 28 08/20/2020    BUN 15 08/20/2020    CREATININE 0.86 08/20/2020    GLUCOSE 142 08/20/2020    GLUCOSE 127 12/26/2011    PROT 7.9 08/20/2020    LABALBU 4.4 08/20/2020    BILITOT 1.01 08/20/2020    ALKPHOS 86 08/20/2020    AST 24 08/20/2020    ALT 24 08/20/2020     Lab Results   Component Value Date    WBC 10.4 08/20/2020    RBC 5.17 08/20/2020    RBC 4.41 12/26/2011    HGB 15.2 08/20/2020    HCT 45.1 08/20/2020    MCV 87.3 08/20/2020    MCH 29.5 08/20/2020    MCHC 33.7 08/20/2020    RDW 13.9 08/20/2020     08/20/2020     12/26/2011 MPV NOT REPORTED 08/20/2020     Lab Results   Component Value Date    TSH 2.82 03/09/2015     Lab Results   Component Value Date    CHOL 92 08/20/2020    HDL 22 08/20/2020    LABA1C 5.8 02/07/2022          Assessment & Plan        Diagnosis Orders   1. Type 2 diabetes mellitus with diabetic polyneuropathy, without long-term current use of insulin (HCC)       A1c=6.2% (previously 5.8%). pt will continue to watch diet. Completed Refills   Requested Prescriptions     Pending Prescriptions Disp Refills    gabapentin (NEURONTIN) 800 MG tablet 90 tablet 3     Sig: Take 1 tablet by mouth 3 times daily for 120 days. No follow-ups on file. No orders of the defined types were placed in this encounter. No orders of the defined types were placed in this encounter. Patient Instructions   SURVEY:    You may be receiving a survey from [employer] regarding your visit today. Please complete the survey to enable us to provide the highest quality of care to you and your family. If you cannot score us a very good (5 Stars) on any question, please call the office to discuss how we could have made your experience a very good one. Thank you. Clinical Care Team: SHANICE Gamboa LPN    Clerical Team: [facility]        Electronically signed by J CARLOS Gamboa CNP on 5/16/2022 at 4:01 PM           Completed Refills   Requested Prescriptions     Pending Prescriptions Disp Refills    gabapentin (NEURONTIN) 800 MG tablet 90 tablet 3     Sig: Take 1 tablet by mouth 3 times daily for 120 days.

## 2022-07-14 RX ORDER — HYDROXYZINE PAMOATE 25 MG/1
CAPSULE ORAL
Qty: 90 CAPSULE | Refills: 1 | Status: SHIPPED | OUTPATIENT
Start: 2022-07-14 | End: 2022-09-16 | Stop reason: SDUPTHER

## 2022-07-14 RX ORDER — HYDROCHLOROTHIAZIDE 25 MG/1
25 TABLET ORAL EVERY MORNING
Qty: 90 TABLET | Refills: 1 | Status: SHIPPED | OUTPATIENT
Start: 2022-07-14

## 2022-07-19 ENCOUNTER — TELEPHONE (OUTPATIENT)
Dept: FAMILY MEDICINE CLINIC | Age: 47
End: 2022-07-19

## 2022-07-19 RX ORDER — GABAPENTIN 800 MG/1
800 TABLET ORAL 3 TIMES DAILY
Qty: 90 TABLET | Refills: 3 | Status: SHIPPED | OUTPATIENT
Start: 2022-07-19 | End: 2022-11-16

## 2022-07-19 NOTE — TELEPHONE ENCOUNTER
Last OV: 5/16/2022 chronic  Last RX:    Next scheduled apt: 11/21/2022 6 month         Pt requesting a refill to [facility]

## 2022-07-19 NOTE — TELEPHONE ENCOUNTER
----- Message from Britta Carsonnathan sent at 7/19/2022  9:56 AM EDT -----  Subject: Refill Request    QUESTIONS  Name of Medication? gabapentin (NEURONTIN) 800 MG tablet  Patient-reported dosage and instructions? Take 1 tablet by mouth 3 times   daily for 120 days. How many days do you have left? 0  Preferred Pharmacy? [id number]  Pharmacy phone number (if available)? [phone]  ---------------------------------------------------------------------------  --------------  CALL BACK INFO  What is the best way for the office to contact you? OK to leave message on   voicemail  Preferred Call Back Phone Number? [phone]  ---------------------------------------------------------------------------  --------------  SCRIPT ANSWERS  Relationship to Patient?  Self

## 2022-07-19 NOTE — TELEPHONE ENCOUNTER
----- Message from Jhonny Pugajossie sent at 7/18/2022  5:03 PM EDT -----  Subject: Refill Request    QUESTIONS  Name of Medication? hydrOXYzine pamoate (VISTARIL) 25 MG capsule  Patient-reported dosage and instructions? 1 tablet 3 times a day  How many days do you have left? 0  Preferred Pharmacy? [id number]  Pharmacy phone number (if available)? [phone]  Additional Information for Provider? 90 day supply  ---------------------------------------------------------------------------  --------------,  Name of Medication? gabapentin (NEURONTIN) 800 MG tablet  Patient-reported dosage and instructions? 1 tablet 3 times a day  How many days do you have left? 0  Preferred Pharmacy? [id number]  Pharmacy phone number (if available)? [phone]  Additional Information for Provider? 90 days supply  ---------------------------------------------------------------------------  --------------  CALL BACK INFO  What is the best way for the office to contact you? OK to leave message on   voicemail  Preferred Call Back Phone Number? [phone]  ---------------------------------------------------------------------------  --------------  SCRIPT ANSWERS  Relationship to Patient?  Self

## 2022-09-16 RX ORDER — HYDROXYZINE PAMOATE 25 MG/1
CAPSULE ORAL
Qty: 90 CAPSULE | Refills: 1 | Status: SHIPPED | OUTPATIENT
Start: 2022-09-16

## 2022-09-16 NOTE — TELEPHONE ENCOUNTER
Last OV: 5/16/2022 chronic   Last RX:    Next scheduled apt: 11/21/2022 6 month DM        Surescript requesting a refill
obese (HCC)     ANSLEY (generalized anxiety disorder)     Type 2 diabetes mellitus with diabetic polyneuropathy, without long-term current use of insulin ([facility]

## 2022-10-20 DIAGNOSIS — E11.42 TYPE 2 DIABETES MELLITUS WITH DIABETIC POLYNEUROPATHY, WITHOUT LONG-TERM CURRENT USE OF INSULIN (HCC): ICD-10-CM

## 2022-10-20 RX ORDER — ALLOPURINOL 300 MG/1
300 TABLET ORAL DAILY
Qty: 30 TABLET | Refills: 5 | Status: SHIPPED | OUTPATIENT
Start: 2022-10-20

## 2022-10-20 RX ORDER — PANTOPRAZOLE SODIUM 40 MG/1
40 TABLET, DELAYED RELEASE ORAL DAILY
Qty: 30 TABLET | Refills: 5 | Status: SHIPPED | OUTPATIENT
Start: 2022-10-20

## 2022-10-20 NOTE — TELEPHONE ENCOUNTER
Protonix 40 mg  Allopurinol 300 mg    DM-Pema    Last check up 5/16/22  Appt 11/21/22    Health Maintenance   Topic Date Due    Hepatitis A vaccine (1 of 2 - Risk 2-dose series) Never done    Pneumococcal 0-64 years Vaccine (1 - PCV) Never done    Hepatitis B vaccine (1 of 3 - Risk 3-dose series) Never done    Colorectal Cancer Screen  Never done    Lipids  08/20/2021    COVID-19 Vaccine (3 - Booster for Pfizer series) 11/01/2021    Flu vaccine (1) Never done    Diabetic retinal exam  11/01/2022    Diabetic foot exam  02/07/2023    Depression Screen  02/07/2023    A1C test (Diabetic or Prediabetic)  05/16/2023    Diabetic microalbuminuria test  05/16/2023    DTaP/Tdap/Td vaccine (2 - Td or Tdap) 07/31/2026    HIV screen  Completed    Hib vaccine  Aged Out    Meningococcal (ACWY) vaccine  Aged Out             (applicable per patient's age: Cancer Screenings, Depression Screening, Fall Risk Screening, Immunizations)    Hemoglobin A1C (%)   Date Value   05/16/2022 6.2   02/07/2022 5.8   11/01/2021 5.3     LDL Cholesterol (mg/dL)   Date Value   08/20/2020 45     AST (U/L)   Date Value   08/20/2020 24     ALT (U/L)   Date Value   08/20/2020 24     BUN (mg/dL)   Date Value   08/20/2020 15      (goal A1C is < 7)   (goal LDL is <100) need 30-50% reduction from baseline     BP Readings from Last 3 Encounters:   05/16/22 112/80   02/07/22 108/64   12/17/21 124/75    (goal /80)      All Future Testing planned in [facility]:  Lab Frequency Next Occurrence   AK NJX DX/THER SBST INTRLMNR LMBR/SAC W/IMG GDN Once 12/24/2021       Next Visit Date:  Future Appointments   Date Time Provider Debbie Rosales   11/21/2022  3:20 PM J CARLOS Coleman - [facility]            Patient Active Problem List:     HTN (hypertension)     Arthritis     Gout     Mass of right wrist     Gastroesophageal reflux disease without esophagitis     History of heroin abuse ([address]     Chronic hepatitis C without hepatic coma ([address] Morbidly obese (HCC)     ANSLEY (generalized anxiety disorder)     Type 2 diabetes mellitus with diabetic polyneuropathy, without long-term current use of insulin ([facility]

## 2022-10-20 NOTE — TELEPHONE ENCOUNTER
Last OV: 5/16/2022  chronic   Last RX:    Next scheduled apt: 11/21/2022 6 months DM          Pt requesting a refill

## 2022-11-15 NOTE — TELEPHONE ENCOUNTER
Last OV: 5/16/2022   chronic   Last RX:    Next scheduled apt: 11/21/2022   6 month DM       Pt requesting a refill

## 2022-11-16 RX ORDER — HYDROXYZINE PAMOATE 25 MG/1
CAPSULE ORAL
Qty: 90 CAPSULE | Refills: 1 | Status: SHIPPED | OUTPATIENT
Start: 2022-11-16

## 2022-11-16 RX ORDER — GABAPENTIN 800 MG/1
800 TABLET ORAL 3 TIMES DAILY
Qty: 90 TABLET | Refills: 3 | Status: SHIPPED | OUTPATIENT
Start: 2022-11-16 | End: 2023-03-16

## 2022-11-16 RX ORDER — TIZANIDINE 4 MG/1
TABLET ORAL
Qty: 90 TABLET | Refills: 1 | Status: SHIPPED | OUTPATIENT
Start: 2022-11-16

## 2022-11-21 ENCOUNTER — OFFICE VISIT (OUTPATIENT)
Dept: FAMILY MEDICINE CLINIC | Age: 47
End: 2022-11-21
Payer: MEDICARE

## 2022-11-21 VITALS
DIASTOLIC BLOOD PRESSURE: 64 MMHG | SYSTOLIC BLOOD PRESSURE: 108 MMHG | WEIGHT: 282 LBS | HEART RATE: 88 BPM | TEMPERATURE: 98 F | BODY MASS INDEX: 36.21 KG/M2 | OXYGEN SATURATION: 96 %

## 2022-11-21 DIAGNOSIS — M54.50 CHRONIC BILATERAL LOW BACK PAIN WITHOUT SCIATICA: ICD-10-CM

## 2022-11-21 DIAGNOSIS — E78.2 MIXED HYPERLIPIDEMIA: ICD-10-CM

## 2022-11-21 DIAGNOSIS — R35.1 NOCTURIA: ICD-10-CM

## 2022-11-21 DIAGNOSIS — G89.29 CHRONIC BILATERAL LOW BACK PAIN WITHOUT SCIATICA: ICD-10-CM

## 2022-11-21 DIAGNOSIS — I10 PRIMARY HYPERTENSION: ICD-10-CM

## 2022-11-21 DIAGNOSIS — K21.9 GASTROESOPHAGEAL REFLUX DISEASE WITHOUT ESOPHAGITIS: Primary | ICD-10-CM

## 2022-11-21 DIAGNOSIS — F19.11 HISTORY OF DRUG ABUSE (HCC): ICD-10-CM

## 2022-11-21 DIAGNOSIS — E11.42 TYPE 2 DIABETES MELLITUS WITH DIABETIC POLYNEUROPATHY, WITHOUT LONG-TERM CURRENT USE OF INSULIN (HCC): ICD-10-CM

## 2022-11-21 DIAGNOSIS — F41.1 GAD (GENERALIZED ANXIETY DISORDER): ICD-10-CM

## 2022-11-21 LAB — HBA1C MFR BLD: 6.4 %

## 2022-11-21 PROCEDURE — 99214 OFFICE O/P EST MOD 30 MIN: CPT | Performed by: NURSE PRACTITIONER

## 2022-11-21 PROCEDURE — G8427 DOCREV CUR MEDS BY ELIG CLIN: HCPCS | Performed by: NURSE PRACTITIONER

## 2022-11-21 PROCEDURE — 4004F PT TOBACCO SCREEN RCVD TLK: CPT | Performed by: NURSE PRACTITIONER

## 2022-11-21 PROCEDURE — 3078F DIAST BP <80 MM HG: CPT | Performed by: NURSE PRACTITIONER

## 2022-11-21 PROCEDURE — 83036 HEMOGLOBIN GLYCOSYLATED A1C: CPT | Performed by: NURSE PRACTITIONER

## 2022-11-21 PROCEDURE — 3044F HG A1C LEVEL LT 7.0%: CPT | Performed by: NURSE PRACTITIONER

## 2022-11-21 PROCEDURE — G8484 FLU IMMUNIZE NO ADMIN: HCPCS | Performed by: NURSE PRACTITIONER

## 2022-11-21 PROCEDURE — 3074F SYST BP LT 130 MM HG: CPT | Performed by: NURSE PRACTITIONER

## 2022-11-21 PROCEDURE — 2022F DILAT RTA XM EVC RTNOPTHY: CPT | Performed by: NURSE PRACTITIONER

## 2022-11-21 PROCEDURE — G8417 CALC BMI ABV UP PARAM F/U: HCPCS | Performed by: NURSE PRACTITIONER

## 2022-11-21 RX ORDER — ATORVASTATIN CALCIUM 80 MG/1
80 TABLET, FILM COATED ORAL DAILY
Qty: 90 TABLET | Refills: 1 | Status: SHIPPED | OUTPATIENT
Start: 2022-11-21

## 2022-11-21 RX ORDER — LISINOPRIL 40 MG/1
40 TABLET ORAL DAILY
Qty: 90 TABLET | Refills: 1 | Status: SHIPPED | OUTPATIENT
Start: 2022-11-21

## 2022-11-21 RX ORDER — CLONIDINE HYDROCHLORIDE 0.2 MG/1
0.2 TABLET ORAL 2 TIMES DAILY
Qty: 180 TABLET | Refills: 1 | Status: SHIPPED | OUTPATIENT
Start: 2022-11-21

## 2022-11-21 RX ORDER — HYDROCHLOROTHIAZIDE 25 MG/1
25 TABLET ORAL EVERY MORNING
Qty: 90 TABLET | Refills: 1 | Status: SHIPPED | OUTPATIENT
Start: 2022-11-21

## 2022-11-21 SDOH — ECONOMIC STABILITY: FOOD INSECURITY: WITHIN THE PAST 12 MONTHS, THE FOOD YOU BOUGHT JUST DIDN'T LAST AND YOU DIDN'T HAVE MONEY TO GET MORE.: NEVER TRUE

## 2022-11-21 SDOH — ECONOMIC STABILITY: FOOD INSECURITY: WITHIN THE PAST 12 MONTHS, YOU WORRIED THAT YOUR FOOD WOULD RUN OUT BEFORE YOU GOT MONEY TO BUY MORE.: NEVER TRUE

## 2022-11-21 ASSESSMENT — ENCOUNTER SYMPTOMS
DIARRHEA: 0
COUGH: 0
VOMITING: 0
CONSTIPATION: 0
ABDOMINAL PAIN: 0
NAUSEA: 0
GLOBUS SENSATION: 0
HEARTBURN: 0
SORE THROAT: 0
BACK PAIN: 1
BOWEL INCONTINENCE: 0
SHORTNESS OF BREATH: 0
BLOOD IN STOOL: 0

## 2022-11-21 ASSESSMENT — SOCIAL DETERMINANTS OF HEALTH (SDOH): HOW HARD IS IT FOR YOU TO PAY FOR THE VERY BASICS LIKE FOOD, HOUSING, MEDICAL CARE, AND HEATING?: NOT HARD AT ALL

## 2022-11-21 NOTE — PATIENT INSTRUCTIONS
SURVEY:    You may be receiving a survey from [employer] regarding your visit today. Please complete the survey to enable us to provide the highest quality of care to you and your family. If you cannot score us a very good (5 Stars) on any question, please call the office to discuss how we could have made your experience a very good one. Thank you.     Clinical Care Team: Quinten Noble, J CARLOS-DAWSON [facility], BERTO    Clerical Team: Ene Coronado

## 2022-11-21 NOTE — PROGRESS NOTES
HPI Notes    Name: Steffi Foster  : 1975         Chief Complaint:     Chief Complaint   Patient presents with    Diabetes Mellitus     Last A1C was 6.2 in     Gastroesophageal Reflux    Mental Health Problem    Back Pain     Chronic back pain    Hyperlipidemia       History of Present Illness:        Gastroesophageal Reflux  He reports no abdominal pain, no chest pain, no coughing, no globus sensation, no heartburn, no nausea or no sore throat. This is a chronic problem. The current episode started more than 1 year ago. The problem occurs rarely. The problem has been gradually improving. Risk factors include caffeine use, lack of exercise and obesity. He has tried a PPI for the symptoms. The treatment provided moderate relief. Mental Health Problem  The primary symptoms include dysphoric mood. The current episode started more than 1 month ago. This is a chronic problem. The onset of the illness is precipitated by emotional stress. The degree of incapacity that he is experiencing as a consequence of his illness is mild. Additional symptoms of the illness include insomnia. Additional symptoms of the illness do not include headaches, abdominal pain or seizures. He does not admit to suicidal ideas. He does not have a plan to attempt suicide. He does not contemplate harming himself. He has not already injured self. He does not contemplate injuring another person. He has not already  injured another person. Back Pain  This is a chronic problem. The current episode started more than 1 year ago. The problem occurs intermittently. The pain is present in the lumbar spine. The quality of the pain is described as aching. The pain is mild. The symptoms are aggravated by bending. Pertinent negatives include no abdominal pain, bladder incontinence, bowel incontinence, chest pain, dysuria, fever, headaches, paresthesias or weakness. Risk factors include obesity. He has tried NSAIDs for the symptoms.  The treatment provided mild relief. Hyperlipidemia  This is a chronic problem. The problem is controlled. Recent lipid tests were reviewed and are normal. Exacerbating diseases include obesity. Factors aggravating his hyperlipidemia include smoking and thiazides. Pertinent negatives include no chest pain or shortness of breath. Current antihyperlipidemic treatment includes statins. The current treatment provides moderate improvement of lipids. There are no compliance problems. Risk factors for coronary artery disease include diabetes mellitus, dyslipidemia, hypertension, male sex and obesity. Diabetes  He presents for his follow-up diabetic visit. He has type 2 diabetes mellitus. His disease course has been stable. Pertinent negatives for hypoglycemia include no dizziness, headaches, nervousness/anxiousness, seizures or tremors. Pertinent negatives for diabetes include no chest pain and no weakness. There are no hypoglycemic complications. Symptoms are stable. Risk factors for coronary artery disease include diabetes mellitus and hypertension. Current diabetic treatment includes diet. He is compliant with treatment all of the time. He is following a generally unhealthy (has been eating more pizza lately) diet. His breakfast blood glucose is taken between 7-8 am. His breakfast blood glucose range is generally 110-130 mg/dl. Hypertension  This is a chronic problem. The current episode started more than 1 year ago. The problem has been gradually improving since onset. The problem is controlled. Pertinent negatives include no chest pain, headaches, neck pain, palpitations or shortness of breath. Risk factors for coronary artery disease include diabetes mellitus, male gender, obesity and smoking/tobacco exposure. Past treatments include ACE inhibitors. The current treatment provides significant improvement. There are no compliance problems. Pt complains of getting up 3-4 times per night to urinate.  Has been going on for several months. Past Medical History:     Past Medical History:   Diagnosis Date    Chicken pox     Chronic back pain     Diabetes mellitus ([address]     Gout     Headache(784.0)     Hepatitis B 03/30/2017    Core antibody IgG/IgM -- positive    Hepatitis C 03/30/2017    Viral RNA by PCR -- detected    Hernia     Hypertension     Osteoarthritis       Reviewed all health maintenance requirements and ordered appropriate tests  Health Maintenance Due   Topic Date Due    Hepatitis A vaccine (1 of 2 - Risk 2-dose series) Never done    Pneumococcal 0-64 years Vaccine (1 - PCV) Never done    Hepatitis B vaccine (1 of 3 - Risk 3-dose series) Never done    Colorectal Cancer Screen  Never done    COVID-19 Vaccine (3 - Booster for Pfizer series) 07/27/2021    Lipids  08/20/2021    Flu vaccine (1) Never done    Diabetic retinal exam  11/01/2022       Past Surgical History:     Past Surgical History:   Procedure Laterality Date    HAND SURGERY Right     cyst removal    PAIN MANAGEMENT PROCEDURE N/A 12/17/2021    L5-S1 INTERLAMINAR EPIDURAL INJECTION performed by Ban Schneider MD at [facility] OR        Medications:       Prior to Admission medications    Medication Sig Start Date End Date Taking? Authorizing Provider   gabapentin (NEURONTIN) 800 MG tablet Take 1 tablet by mouth 3 times daily for 120 days.  11/16/22 3/16/23 Yes J CARLOS Chiang CNP   hydrOXYzine pamoate (VISTARIL) 25 MG capsule TAKE 1 CAPSULE BY MOUTH THREE TIMES DAILY AS NEEDED FOR ANXIETY 11/16/22  Yes J CARLOS Martins CNP   tiZANidine (ZANAFLEX) 4 MG tablet TAKE 1 TABLET BY MOUTH NIGHTLY AS NEEDED for neck pain or FOR MUSCLE SPASMS 11/16/22  Yes J CARLOS Martins CNP   pantoprazole (PROTONIX) 40 MG tablet Take 1 tablet by mouth daily TAKE 1 TABLET BY MOUTH EVERY DAY 10/20/22  Yes J CARLOS Martins CNP   allopurinol (ZYLOPRIM) 300 MG tablet Take 1 tablet by mouth daily TAKE 1 TABLET BY MOUTH EVERY DAY 10/20/22  Yes J CARLOS Schafer CNP   hydroCHLOROthiazide (HYDRODIURIL) 25 MG tablet TAKE 1 TABLET BY MOUTH EVERY MORNING 7/14/22  Yes J CARLOS Schafer CNP   atorvastatin (LIPITOR) 80 MG tablet TAKE 1 TABLET BY MOUTH DAILY 5/16/22  Yes J CARLOS Schafer CNP   cloNIDine (CATAPRES) 0.2 MG tablet TAKE 1 TABLET BY MOUTH TWICE DAILY 5/16/22  Yes J CARLOS Schafer CNP   lisinopril (PRINIVIL;ZESTRIL) 40 MG tablet TAKE 1 TABLET BY MOUTH DAILY 5/16/22  Yes J CARLOS Schafer CNP   Lancets MISC Check blood sugar twice daily. Whatever brand covered by insurance. 7/29/21  Yes J CARLOS Schafer CNP   blood glucose monitor strips Check twice daily. Whatever brand covered by insurance. 7/29/21  Yes J CARLOS Schafer CNP   blood glucose monitor kit and supplies Check twice daily. Whatever brand covered by insurance 1/21/21  Yes J CARLOS Schafer CNP        Allergies:       Bee venom and Penicillins    Social History:     Tobacco:    reports that he has been smoking cigarettes. He has been smoking an average of 1 pack per day. He has never used smokeless tobacco.  Alcohol:      reports no history of alcohol use. Drug Use:  reports no history of drug use. Family History:     Family History   Problem Relation Age of Onset    Diabetes Mother     Diabetes Father     Heart Disease Brother        Review of Systems:         Review of Systems   Constitutional:  Negative for chills and fever. HENT:  Negative for sore throat. Respiratory:  Negative for cough and shortness of breath. Cardiovascular:  Negative for chest pain, palpitations and leg swelling. Gastrointestinal:  Negative for abdominal pain, blood in stool, bowel incontinence, constipation, diarrhea, heartburn, nausea and vomiting. Genitourinary:  Negative for bladder incontinence, dysuria, frequency and hematuria. Musculoskeletal:  Positive for back pain. Negative for neck pain. Skin:  Negative for rash. Neurological:  Negative for dizziness, tremors, seizures, weakness, headaches and paresthesias. Hematological:  Does not bruise/bleed easily. Psychiatric/Behavioral:  Positive for dysphoric mood. Negative for suicidal ideas. The patient has insomnia. The patient is not nervous/anxious. Physical Exam:     Vitals:  /64   Pulse 88   Temp 98 °F (36.7 °C) (Oral)   Wt 282 lb (127.9 kg)   SpO2 96%   BMI 36.21 kg/m²       Physical Exam  Vitals and nursing note reviewed. Constitutional:       Appearance: He is well-developed. HENT:      Head: Normocephalic. Right Ear: Hearing, tympanic membrane and external ear normal.      Left Ear: Tympanic membrane and external ear normal.      Nose: Nose normal.      Mouth/Throat:      Pharynx: Uvula midline. No oropharyngeal exudate. Eyes:      Conjunctiva/sclera: Conjunctivae normal.      Pupils: Pupils are equal, round, and reactive to light. Neck:      Vascular: No carotid bruit. Cardiovascular:      Rate and Rhythm: Normal rate and regular rhythm. Pulses:           Dorsalis pedis pulses are 2+ on the right side and 2+ on the left side. Heart sounds: S1 normal and S2 normal.   Pulmonary:      Effort: Pulmonary effort is normal. No respiratory distress. Breath sounds: Normal breath sounds. Abdominal:      Palpations: Abdomen is soft. Tenderness: There is no abdominal tenderness. Musculoskeletal:         General: Normal range of motion. Cervical back: Normal range of motion and neck supple. Skin:     General: Skin is warm and dry. Findings: No rash. Neurological:      Mental Status: He is alert and oriented to person, place, and time. GCS: GCS eye subscore is 4. GCS verbal subscore is 5. GCS motor subscore is 6. Deep Tendon Reflexes: Reflexes are normal and symmetric. Psychiatric:         Speech: Speech normal.         Behavior: Behavior normal. Behavior is cooperative.          Thought Content: Thought content normal.         Judgment: Judgment normal.             Data:     Lab Results   Component Value Date/Time     08/20/2020 04:14 PM    K 4.1 08/20/2020 04:14 PM     08/20/2020 04:14 PM    CO2 28 08/20/2020 04:14 PM    BUN 15 08/20/2020 04:14 PM    CREATININE 0.86 08/20/2020 04:14 PM    GLUCOSE 142 08/20/2020 04:14 PM    GLUCOSE 127 12/26/2011 12:23 AM    PROT 7.9 08/20/2020 04:14 PM    LABALBU 4.4 08/20/2020 04:14 PM    BILITOT 1.01 08/20/2020 04:14 PM    ALKPHOS 86 08/20/2020 04:14 PM    AST 24 08/20/2020 04:14 PM    ALT 24 08/20/2020 04:14 PM     Lab Results   Component Value Date/Time    WBC 10.4 08/20/2020 04:14 PM    RBC 5.17 08/20/2020 04:14 PM    RBC 4.41 12/26/2011 12:23 AM    HGB 15.2 08/20/2020 04:14 PM    HCT 45.1 08/20/2020 04:14 PM    MCV 87.3 08/20/2020 04:14 PM    MCH 29.5 08/20/2020 04:14 PM    MCHC 33.7 08/20/2020 04:14 PM    RDW 13.9 08/20/2020 04:14 PM     08/20/2020 04:14 PM     12/26/2011 12:23 AM    MPV NOT REPORTED 08/20/2020 04:14 PM     Lab Results   Component Value Date/Time    TSH 2.82 03/09/2015 04:05 PM     Lab Results   Component Value Date/Time    CHOL 92 08/20/2020 04:14 PM    HDL 22 08/20/2020 04:14 PM    LABA1C 6.2 05/16/2022 03:40 PM          Assessment & Plan        Diagnosis Orders   1. Gastroesophageal reflux disease without esophagitis   -- Doing well on PPI. Continue same medication and diet modification       2. ANSLEY (generalized anxiety disorder)   --Symptoms controlled with hydroxyzine       3. Chronic bilateral low back pain without sciatica   --Symptoms controlled with gabapentin       4. Mixed hyperlipidemia   --Tolerating statin well. Will send for lipid level       5. Type 2 diabetes mellitus with diabetic polyneuropathy, without long-term current use of insulin (HCC)   --Hemoglobin A1c 6.4% (previously 6.2%). Patient is still diet only controlled. Patient advised to clean up his diet as he has been eating more pizza lately. If A1c gets greater than 6.5%, will add back metformin. 6. Primary hypertension   --BP well controlled at this time. Continue same medication         Patient verbalizes understanding and agreement with plan. All questions answered. If symptoms do not resolve or worsen, return to office. Completed Refills   Requested Prescriptions     Pending Prescriptions Disp Refills    hydroCHLOROthiazide (HYDRODIURIL) 25 MG tablet 90 tablet 1     Sig: Take 1 tablet by mouth every morning    atorvastatin (LIPITOR) 80 MG tablet 90 tablet 1     Sig: Take 1 tablet by mouth daily    cloNIDine (CATAPRES) 0.2 MG tablet 180 tablet 1     Sig: Take 1 tablet by mouth 2 times daily    lisinopril (PRINIVIL;ZESTRIL) 40 MG tablet 90 tablet 1     Sig: Take 1 tablet by mouth daily     No follow-ups on file. No orders of the defined types were placed in this encounter. No orders of the defined types were placed in this encounter. Patient Instructions   SURVEY:    You may be receiving a survey from [employer] regarding your visit today. Please complete the survey to enable us to provide the highest quality of care to you and your family. If you cannot score us a very good (5 Stars) on any question, please call the office to discuss how we could have made your experience a very good one. Thank you.     Clinical Care Team: SHANICE Gonzalez LPN    Clerical Team: [facility]   Electronically signed by J CARLOS Gonzalez CNP on 11/21/2022 at 4:02 PM           Completed Refills   Requested Prescriptions     Pending Prescriptions Disp Refills    hydroCHLOROthiazide (HYDRODIURIL) 25 MG tablet 90 tablet 1     Sig: Take 1 tablet by mouth every morning    atorvastatin (LIPITOR) 80 MG tablet 90 tablet 1     Sig: Take 1 tablet by mouth daily    cloNIDine (CATAPRES) 0.2 MG tablet 180 tablet 1     Sig: Take 1 tablet by mouth 2 times daily    lisinopril (PRINIVIL;ZESTRIL) 40 MG tablet 90 tablet 1     Sig: Take 1 tablet by mouth daily

## 2023-01-16 RX ORDER — HYDROXYZINE PAMOATE 25 MG/1
CAPSULE ORAL
Qty: 90 CAPSULE | Refills: 1 | Status: SHIPPED | OUTPATIENT
Start: 2023-01-16

## 2023-01-16 NOTE — TELEPHONE ENCOUNTER
Last OV: 11/21/2022 chronic   Last RX:    Next scheduled apt: 5/22/2023   6 months chronic             Surescript requesting a refill

## 2023-03-27 RX ORDER — HYDROXYZINE PAMOATE 25 MG/1
25 CAPSULE ORAL 3 TIMES DAILY PRN
Qty: 90 CAPSULE | Refills: 1 | Status: SHIPPED | OUTPATIENT
Start: 2023-03-27

## 2023-03-27 RX ORDER — GABAPENTIN 800 MG/1
800 TABLET ORAL 3 TIMES DAILY
Qty: 90 TABLET | Refills: 3 | Status: SHIPPED | OUTPATIENT
Start: 2023-03-27 | End: 2023-07-25

## 2023-03-27 NOTE — TELEPHONE ENCOUNTER
Gabapentin 800 mg  vistaril 25 mg    DM-Pema    Last check up 11/21/22  Appt 5/22/23    Health Maintenance   Topic Date Due    Hepatitis A vaccine (1 of 2 - Risk 2-dose series) Never done    Pneumococcal 0-64 years Vaccine (1 - PCV) Never done    Hepatitis B vaccine (1 of 3 - Risk 3-dose series) Never done    Colorectal Cancer Screen  Never done    COVID-19 Vaccine (3 - Booster for Pfizer series) 07/27/2021    GFR test (Diabetes, CKD 3-4, OR last GFR 15-59)  08/20/2021    Flu vaccine (1) Never done    Diabetic retinal exam  11/01/2022    Diabetic foot exam  02/07/2023    Depression Screen  02/07/2023    Diabetic Alb to Cr ratio (uACR) test  05/16/2023    A1C test (Diabetic or Prediabetic)  02/19/2024    Lipids  02/19/2024    DTaP/Tdap/Td vaccine (2 - Td or Tdap) 07/31/2026    HIV screen  Completed    Hib vaccine  Aged Out    Meningococcal (ACWY) vaccine  Aged Out             (applicable per patient's age: Cancer Screenings, Depression Screening, Fall Risk Screening, Immunizations)    Hemoglobin A1C (%)   Date Value   11/21/2022 6.4   05/16/2022 6.2   02/07/2022 5.8     LDL Cholesterol (mg/dL)   Date Value   08/20/2020 45     AST (U/L)   Date Value   08/20/2020 24     ALT (U/L)   Date Value   08/20/2020 24     BUN (mg/dL)   Date Value   08/20/2020 15      (goal A1C is < 7)   (goal LDL is <100) need 30-50% reduction from baseline     BP Readings from Last 3 Encounters:   11/21/22 108/64   05/16/22 112/80   02/07/22 108/64    (goal /80)      All Future Testing planned in [facility]:  Lab Frequency Next Occurrence   CBC with Auto Differential Once 11/21/2023   Comprehensive Metabolic Panel Once 07/06/3414   Lipid Panel Once 11/21/2023   PSA Screening Once 11/21/2022   Urine Drug Screen Once 11/21/2022       Next Visit Date:  Future Appointments   Date Time Provider Debbie Rosales   5/22/2023  3:00 PM J CARLOS Ortiz CNP [facility]            Patient Active Problem List:     HTN

## 2023-04-18 DIAGNOSIS — E11.42 TYPE 2 DIABETES MELLITUS WITH DIABETIC POLYNEUROPATHY, WITHOUT LONG-TERM CURRENT USE OF INSULIN (HCC): ICD-10-CM

## 2023-04-18 NOTE — TELEPHONE ENCOUNTER
Last OV: 11/21/2022  chronic   Last RX:    Next scheduled apt: 5/22/2023  6 month chronic           Surescript requesting a refill

## 2023-04-18 NOTE — TELEPHONE ENCOUNTER
Patient asking for new scripts for Allopurinol & Pantoprazole to be sent to [facility] Maintenance   Topic Date Due    Hepatitis A vaccine (1 of 2 - Risk 2-dose series) Never done    Pneumococcal 0-64 years Vaccine (1 - PCV) Never done    Hepatitis B vaccine (1 of 3 - Risk 3-dose series) Never done    Colorectal Cancer Screen  Never done    COVID-19 Vaccine (3 - Booster for Pfizer series) 07/27/2021    Lipids  08/20/2021    GFR test (Diabetes, CKD 3-4, OR last GFR 15-59)  08/20/2021    Diabetic retinal exam  11/01/2022    Diabetic foot exam  02/07/2023    Depression Screen  02/07/2023    Diabetic Alb to Cr ratio (uACR) test  05/16/2023    Flu vaccine (Season Ended) 08/01/2023    A1C test (Diabetic or Prediabetic)  11/21/2023    DTaP/Tdap/Td vaccine (2 - Td or Tdap) 07/31/2026    HIV screen  Completed    Hib vaccine  Aged Out    Meningococcal (ACWY) vaccine  Aged Out             (applicable per patient's age: Cancer Screenings, Depression Screening, Fall Risk Screening, Immunizations)    Hemoglobin A1C (%)   Date Value   11/21/2022 6.4   05/16/2022 6.2   02/07/2022 5.8     LDL Cholesterol (mg/dL)   Date Value   08/20/2020 45     AST (U/L)   Date Value   08/20/2020 24     ALT (U/L)   Date Value   08/20/2020 24     BUN (mg/dL)   Date Value   08/20/2020 15      (goal A1C is < 7)   (goal LDL is <100) need 30-50% reduction from baseline     BP Readings from Last 3 Encounters:   11/21/22 108/64   05/16/22 112/80   02/07/22 108/64    (goal /80)      All Future Testing planned in [facility]:  Lab Frequency Next Occurrence   CBC with Auto Differential Once 11/21/2023   Comprehensive Metabolic Panel Once 76/28/3657   Lipid Panel Once 11/21/2023   PSA Screening Once 11/21/2022   Urine Drug Screen Once 11/21/2022       Next Visit Date:  Future Appointments   Date Time Provider Debbie Rosales   5/22/2023  3:00 PM [facility]            Patient Active Problem List:

## 2023-04-19 RX ORDER — ALLOPURINOL 300 MG/1
300 TABLET ORAL DAILY
Qty: 30 TABLET | Refills: 5 | Status: SHIPPED | OUTPATIENT
Start: 2023-04-19

## 2023-04-19 RX ORDER — PANTOPRAZOLE SODIUM 40 MG/1
40 TABLET, DELAYED RELEASE ORAL DAILY
Qty: 30 TABLET | Refills: 5 | Status: SHIPPED | OUTPATIENT
Start: 2023-04-19

## 2023-05-12 RX ORDER — ATORVASTATIN CALCIUM 80 MG/1
80 TABLET, FILM COATED ORAL DAILY
Qty: 90 TABLET | Refills: 1 | Status: SHIPPED | OUTPATIENT
Start: 2023-05-12

## 2023-05-12 RX ORDER — TIZANIDINE 4 MG/1
TABLET ORAL
Qty: 90 TABLET | Refills: 1 | Status: SHIPPED | OUTPATIENT
Start: 2023-05-12

## 2023-05-12 RX ORDER — CLONIDINE HYDROCHLORIDE 0.2 MG/1
0.2 TABLET ORAL 2 TIMES DAILY
Qty: 180 TABLET | Refills: 1 | Status: SHIPPED | OUTPATIENT
Start: 2023-05-12

## 2023-05-12 RX ORDER — LISINOPRIL 40 MG/1
40 TABLET ORAL DAILY
Qty: 90 TABLET | Refills: 1 | Status: SHIPPED | OUTPATIENT
Start: 2023-05-12

## 2023-05-22 ENCOUNTER — OFFICE VISIT (OUTPATIENT)
Dept: FAMILY MEDICINE CLINIC | Age: 48
End: 2023-05-22
Payer: MEDICAID

## 2023-05-22 ENCOUNTER — HOSPITAL ENCOUNTER (OUTPATIENT)
Age: 48
Setting detail: SPECIMEN
Discharge: HOME | End: 2023-05-22
Payer: MEDICAID

## 2023-05-22 VITALS
OXYGEN SATURATION: 97 % | SYSTOLIC BLOOD PRESSURE: 110 MMHG | WEIGHT: 262 LBS | TEMPERATURE: 97.7 F | DIASTOLIC BLOOD PRESSURE: 82 MMHG | BODY MASS INDEX: 33.64 KG/M2 | HEART RATE: 74 BPM

## 2023-05-22 DIAGNOSIS — E78.2 MIXED HYPERLIPIDEMIA: ICD-10-CM

## 2023-05-22 DIAGNOSIS — F41.1 GAD (GENERALIZED ANXIETY DISORDER): ICD-10-CM

## 2023-05-22 DIAGNOSIS — K21.9 GASTROESOPHAGEAL REFLUX DISEASE WITHOUT ESOPHAGITIS: ICD-10-CM

## 2023-05-22 DIAGNOSIS — I10 PRIMARY HYPERTENSION: ICD-10-CM

## 2023-05-22 DIAGNOSIS — E11.42 TYPE 2 DIABETES MELLITUS WITH DIABETIC POLYNEUROPATHY, WITHOUT LONG-TERM CURRENT USE OF INSULIN (HCC): Primary | ICD-10-CM

## 2023-05-22 LAB
AMPHET UR QL SCN: NEGATIVE
BARBITURATES UR QL SCN: NEGATIVE
BENZODIAZ UR QL: NEGATIVE
CANNABINOID SCREEN URINE: POSITIVE
COCAINE UR QL SCN: NEGATIVE
CREATININE URINE POCT: NORMAL
FENTANYL URINE: NEGATIVE
HBA1C MFR BLD: 6 %
METHADONE SCREEN, URINE: NEGATIVE
MICROALBUMIN/CREAT 24H UR: NORMAL MG/G{CREAT}
MICROALBUMIN/CREAT UR-RTO: NORMAL
OPIATES UR QL SCN: NEGATIVE
OXYCODONE SCREEN URINE: NEGATIVE
PCP UR QL SCN: NEGATIVE
TEST INFORMATION: ABNORMAL

## 2023-05-22 PROCEDURE — 3074F SYST BP LT 130 MM HG: CPT | Performed by: NURSE PRACTITIONER

## 2023-05-22 PROCEDURE — 3044F HG A1C LEVEL LT 7.0%: CPT | Performed by: NURSE PRACTITIONER

## 2023-05-22 PROCEDURE — 82044 UR ALBUMIN SEMIQUANTITATIVE: CPT | Performed by: NURSE PRACTITIONER

## 2023-05-22 PROCEDURE — 99214 OFFICE O/P EST MOD 30 MIN: CPT | Performed by: NURSE PRACTITIONER

## 2023-05-22 PROCEDURE — 80307 DRUG TEST PRSMV CHEM ANLYZR: CPT

## 2023-05-22 PROCEDURE — 3079F DIAST BP 80-89 MM HG: CPT | Performed by: NURSE PRACTITIONER

## 2023-05-22 PROCEDURE — 83036 HEMOGLOBIN GLYCOSYLATED A1C: CPT | Performed by: NURSE PRACTITIONER

## 2023-05-22 RX ORDER — HYDROXYZINE PAMOATE 25 MG/1
25 CAPSULE ORAL 3 TIMES DAILY PRN
Qty: 90 CAPSULE | Refills: 1 | Status: SHIPPED | OUTPATIENT
Start: 2023-05-22

## 2023-05-22 RX ORDER — HYDROCHLOROTHIAZIDE 25 MG/1
25 TABLET ORAL EVERY MORNING
Qty: 90 TABLET | Refills: 1 | Status: SHIPPED | OUTPATIENT
Start: 2023-05-22

## 2023-05-22 RX ORDER — GABAPENTIN 800 MG/1
800 TABLET ORAL 3 TIMES DAILY
Qty: 90 TABLET | Refills: 3 | Status: SHIPPED | OUTPATIENT
Start: 2023-05-22 | End: 2023-09-19

## 2023-05-22 SDOH — ECONOMIC STABILITY: INCOME INSECURITY: HOW HARD IS IT FOR YOU TO PAY FOR THE VERY BASICS LIKE FOOD, HOUSING, MEDICAL CARE, AND HEATING?: NOT HARD AT ALL

## 2023-05-22 SDOH — ECONOMIC STABILITY: FOOD INSECURITY: WITHIN THE PAST 12 MONTHS, YOU WORRIED THAT YOUR FOOD WOULD RUN OUT BEFORE YOU GOT MONEY TO BUY MORE.: NEVER TRUE

## 2023-05-22 SDOH — ECONOMIC STABILITY: FOOD INSECURITY: WITHIN THE PAST 12 MONTHS, THE FOOD YOU BOUGHT JUST DIDN'T LAST AND YOU DIDN'T HAVE MONEY TO GET MORE.: NEVER TRUE

## 2023-05-22 SDOH — ECONOMIC STABILITY: HOUSING INSECURITY
IN THE LAST 12 MONTHS, WAS THERE A TIME WHEN YOU DID NOT HAVE A STEADY PLACE TO SLEEP OR SLEPT IN A SHELTER (INCLUDING NOW)?: NO

## 2023-05-22 ASSESSMENT — ENCOUNTER SYMPTOMS
VISUAL CHANGE: 0
HEARTBURN: 0
GLOBUS SENSATION: 0
NAUSEA: 1

## 2023-05-22 NOTE — PROGRESS NOTES
Disp Refills    hydrOXYzine pamoate (VISTARIL) 25 MG capsule 90 capsule 1     Sig: Take 1 capsule by mouth 3 times daily as needed for Itching    hydroCHLOROthiazide (HYDRODIURIL) 25 MG tablet 90 tablet 1     Sig: Take 1 tablet by mouth every morning    gabapentin (NEURONTIN) 800 MG tablet 90 tablet 3     Sig: Take 1 tablet by mouth 3 times daily for 120 days.

## 2023-07-28 RX ORDER — GABAPENTIN 800 MG/1
800 TABLET ORAL 3 TIMES DAILY
Qty: 90 TABLET | Refills: 3 | Status: SHIPPED | OUTPATIENT
Start: 2023-07-28 | End: 2023-11-25

## 2023-07-28 RX ORDER — HYDROXYZINE PAMOATE 25 MG/1
25 CAPSULE ORAL 3 TIMES DAILY PRN
Qty: 90 CAPSULE | Refills: 1 | Status: SHIPPED | OUTPATIENT
Start: 2023-07-28

## 2023-07-28 NOTE — TELEPHONE ENCOUNTER
Last OV: 5/22/2023  chronic   Last RX:    Next scheduled apt: 8/21/2023   3 months chronic             Surescript requesting a refill

## 2023-07-28 NOTE — TELEPHONE ENCOUNTER
Gabapentin 800 mg  Vistaril 25 mg    Dm-Pema    Last check up 5/22/23  Check up 8/21/23    Health Maintenance   Topic Date Due    Hepatitis A vaccine (1 of 2 - Risk 2-dose series) Never done    Pneumococcal 0-64 years Vaccine (1 - PCV) Never done    Hepatitis B vaccine (1 of 3 - Risk 3-dose series) Never done    Colorectal Cancer Screen  Never done    COVID-19 Vaccine (3 - Booster for Pfizer series) 07/27/2021    Lipids  08/20/2021    GFR test (Diabetes, CKD 3-4, OR last GFR 15-59)  08/20/2021    Diabetic retinal exam  11/01/2022    Depression Screen  02/07/2023    Flu vaccine (1) 08/01/2023    Diabetic foot exam  05/22/2024    A1C test (Diabetic or Prediabetic)  05/22/2024    Diabetic Alb to Cr ratio (uACR) test  05/22/2024    DTaP/Tdap/Td vaccine (2 - Td or Tdap) 07/31/2026    HIV screen  Completed    Hib vaccine  Aged Out    Meningococcal (ACWY) vaccine  Aged Out             (applicable per patient's age: Cancer Screenings, Depression Screening, Fall Risk Screening, Immunizations)    Hemoglobin A1C (%)   Date Value   05/22/2023 6.0   11/21/2022 6.4   05/16/2022 6.2     LDL Cholesterol (mg/dL)   Date Value   08/20/2020 45     AST (U/L)   Date Value   08/20/2020 24     ALT (U/L)   Date Value   08/20/2020 24     BUN (mg/dL)   Date Value   08/20/2020 15      (goal A1C is < 7)   (goal LDL is <100) need 30-50% reduction from baseline     BP Readings from Last 3 Encounters:   05/22/23 110/82   11/21/22 108/64   05/16/22 112/80    (goal /80)      All Future Testing planned in [facility]:  Lab Frequency Next Occurrence   CBC with Auto Differential Once 11/21/2023   Comprehensive Metabolic Panel Once 15/01/6264   Lipid Panel Once 11/21/2023   PSA Screening Once 11/21/2022       Next Visit Date:  Future Appointments   Date Time Provider [address]   8/21/2023  3:00 PM Sahara Weber APRN - CNP [facility]            Patient Active Problem List:     HTN (hypertension)     Arthritis     Gout     Mass

## 2023-09-20 NOTE — TELEPHONE ENCOUNTER
----- Message from Keena Saadjanette sent at 9/20/2023  4:23 PM EDT -----  Subject: Refill Request    QUESTIONS  Name of Medication? hydrOXYzine pamoate (VISTARIL) 25 MG capsule  Patient-reported dosage and instructions? once daily   How many days do you have left? 3  Preferred Pharmacy? [id number]  Pharmacy phone number (if available)? [phone]  ---------------------------------------------------------------------------  --------------  CALL BACK INFO  What is the best way for the office to contact you? OK to leave message on   voicemail,OK to respond with electronic message via [employer] portal (only   for patients who have registered [employer] account)  Preferred Call Back Phone Number? [phone]  ---------------------------------------------------------------------------  --------------  SCRIPT ANSWERS  Relationship to Patient?  Self

## 2023-09-20 NOTE — TELEPHONE ENCOUNTER
Last OV: 5/22/2023   chronic   Last RX:    Next scheduled apt: 10/10/2023   3 months           Pt requesting a refill to [facility]

## 2023-09-21 RX ORDER — HYDROXYZINE PAMOATE 25 MG/1
25 CAPSULE ORAL 3 TIMES DAILY PRN
Qty: 90 CAPSULE | Refills: 1 | Status: SHIPPED | OUTPATIENT
Start: 2023-09-21

## 2023-10-06 ENCOUNTER — TELEPHONE (OUTPATIENT)
Dept: FAMILY MEDICINE CLINIC | Age: 48
End: 2023-10-06

## 2023-10-06 NOTE — TELEPHONE ENCOUNTER
----- Message from Krishan Brink sent at 10/6/2023  4:02 PM EDT -----  Subject: Message to Provider    QUESTIONS  Information for Provider? Patient wanted to know at his upcoming chandler on   the 10/10 would like Dr. Eulogio Temple be able to do Dot Phyical for him at his   3month check up please advise, I told the patient he may not do it and if   he can t do it could he refera a doctor that does the Dot Physical  ---------------------------------------------------------------------------  --------------  [address]  [phone]; OK to leave message on voicemail,OK to respond with electronic   message via [employer]ches portal (only for patients who have registered etouches   account)  ---------------------------------------------------------------------------  --------------  SCRIPT ANSWERS  Relationship to Patient?  Self

## 2023-10-10 ENCOUNTER — OFFICE VISIT (OUTPATIENT)
Dept: FAMILY MEDICINE CLINIC | Age: 48
End: 2023-10-10
Payer: COMMERCIAL

## 2023-10-10 VITALS
BODY MASS INDEX: 36.7 KG/M2 | WEIGHT: 286 LBS | OXYGEN SATURATION: 94 % | SYSTOLIC BLOOD PRESSURE: 112 MMHG | HEART RATE: 74 BPM | DIASTOLIC BLOOD PRESSURE: 80 MMHG | HEIGHT: 74 IN

## 2023-10-10 DIAGNOSIS — I10 PRIMARY HYPERTENSION: ICD-10-CM

## 2023-10-10 DIAGNOSIS — F41.1 GAD (GENERALIZED ANXIETY DISORDER): ICD-10-CM

## 2023-10-10 DIAGNOSIS — E78.2 MIXED HYPERLIPIDEMIA: ICD-10-CM

## 2023-10-10 DIAGNOSIS — E11.42 TYPE 2 DIABETES MELLITUS WITH DIABETIC POLYNEUROPATHY, WITHOUT LONG-TERM CURRENT USE OF INSULIN (HCC): Primary | ICD-10-CM

## 2023-10-10 DIAGNOSIS — B18.2 CHRONIC HEPATITIS C WITHOUT HEPATIC COMA (HCC): ICD-10-CM

## 2023-10-10 DIAGNOSIS — K21.9 GASTROESOPHAGEAL REFLUX DISEASE WITHOUT ESOPHAGITIS: ICD-10-CM

## 2023-10-10 PROCEDURE — 99214 OFFICE O/P EST MOD 30 MIN: CPT | Performed by: NURSE PRACTITIONER

## 2023-10-10 PROCEDURE — G8427 DOCREV CUR MEDS BY ELIG CLIN: HCPCS | Performed by: NURSE PRACTITIONER

## 2023-10-10 PROCEDURE — 1036F TOBACCO NON-USER: CPT | Performed by: NURSE PRACTITIONER

## 2023-10-10 PROCEDURE — 3074F SYST BP LT 130 MM HG: CPT | Performed by: NURSE PRACTITIONER

## 2023-10-10 PROCEDURE — G8484 FLU IMMUNIZE NO ADMIN: HCPCS | Performed by: NURSE PRACTITIONER

## 2023-10-10 PROCEDURE — G8417 CALC BMI ABV UP PARAM F/U: HCPCS | Performed by: NURSE PRACTITIONER

## 2023-10-10 PROCEDURE — 3044F HG A1C LEVEL LT 7.0%: CPT | Performed by: NURSE PRACTITIONER

## 2023-10-10 PROCEDURE — 3079F DIAST BP 80-89 MM HG: CPT | Performed by: NURSE PRACTITIONER

## 2023-10-10 PROCEDURE — 2022F DILAT RTA XM EVC RTNOPTHY: CPT | Performed by: NURSE PRACTITIONER

## 2023-10-10 RX ORDER — TIZANIDINE 4 MG/1
TABLET ORAL
Qty: 90 TABLET | Refills: 1 | Status: SHIPPED | OUTPATIENT
Start: 2023-10-10

## 2023-10-10 RX ORDER — ALLOPURINOL 300 MG/1
300 TABLET ORAL DAILY
Qty: 90 TABLET | Refills: 1 | Status: SHIPPED | OUTPATIENT
Start: 2023-10-10

## 2023-10-10 RX ORDER — PANTOPRAZOLE SODIUM 40 MG/1
40 TABLET, DELAYED RELEASE ORAL DAILY
Qty: 90 TABLET | Refills: 1 | Status: SHIPPED | OUTPATIENT
Start: 2023-10-10

## 2023-10-10 RX ORDER — CLONIDINE HYDROCHLORIDE 0.2 MG/1
0.2 TABLET ORAL 2 TIMES DAILY
Qty: 180 TABLET | Refills: 1 | Status: SHIPPED | OUTPATIENT
Start: 2023-10-10

## 2023-10-10 RX ORDER — HYDROCHLOROTHIAZIDE 25 MG/1
25 TABLET ORAL EVERY MORNING
Qty: 90 TABLET | Refills: 1 | Status: SHIPPED | OUTPATIENT
Start: 2023-10-10

## 2023-10-10 RX ORDER — LISINOPRIL 40 MG/1
40 TABLET ORAL DAILY
Qty: 90 TABLET | Refills: 1 | Status: SHIPPED | OUTPATIENT
Start: 2023-10-10

## 2023-10-10 RX ORDER — ATORVASTATIN CALCIUM 80 MG/1
80 TABLET, FILM COATED ORAL DAILY
Qty: 90 TABLET | Refills: 1 | Status: SHIPPED | OUTPATIENT
Start: 2023-10-10

## 2023-10-10 ASSESSMENT — PATIENT HEALTH QUESTIONNAIRE - PHQ9
SUM OF ALL RESPONSES TO PHQ QUESTIONS 1-9: 0
SUM OF ALL RESPONSES TO PHQ QUESTIONS 1-9: 0
SUM OF ALL RESPONSES TO PHQ9 QUESTIONS 1 & 2: 0
1. LITTLE INTEREST OR PLEASURE IN DOING THINGS: 0
SUM OF ALL RESPONSES TO PHQ QUESTIONS 1-9: 0
2. FEELING DOWN, DEPRESSED OR HOPELESS: 0
SUM OF ALL RESPONSES TO PHQ QUESTIONS 1-9: 0

## 2023-10-10 ASSESSMENT — ENCOUNTER SYMPTOMS
ABDOMINAL PAIN: 0
BACK PAIN: 0
BLOOD IN STOOL: 0
CONSTIPATION: 0
SHORTNESS OF BREATH: 0
SORE THROAT: 0
VISUAL CHANGE: 0
GLOBUS SENSATION: 0
NAUSEA: 0
HEARTBURN: 0
DIARRHEA: 0
COUGH: 0
VOMITING: 0

## 2023-10-10 NOTE — PATIENT INSTRUCTIONS
SURVEY:    You may be receiving a survey from [employer] regarding your visit today. Please complete the survey to enable us to provide the highest quality of care to you and your family. If you cannot score us a very good (5 Stars) on any question, please call the office to discuss how we could have made your experience a very good one. Thank you.     Clinical Care Team: J CARLOS Ly-DAWSON Jacobson LPN    Clerical Team: [address]

## 2023-10-10 NOTE — PROGRESS NOTES
provide the highest quality of care to you and your family. If you cannot score us a very good (5 Stars) on any question, please call the office to discuss how we could have made your experience a very good one. Thank you. Clinical Care Team: J CARLOS Herrera-DAWSON Cain LPN    Clerical Team: Eliceo Bagley   Electronically signed by Liz Richey  on 10/10/2023 at 12:48 PM           Completed Refills   Requested Prescriptions     Pending Prescriptions Disp Refills    pantoprazole (PROTONIX) 40 MG tablet 30 tablet 5     Sig: Take 1 tablet by mouth daily TAKE 1 TABLET BY MOUTH EVERY DAY    allopurinol (ZYLOPRIM) 300 MG tablet 30 tablet 5     Sig: Take 1 tablet by mouth daily TAKE 1 TABLET BY MOUTH EVERY DAY

## 2023-11-21 RX ORDER — GABAPENTIN 800 MG/1
800 TABLET ORAL 3 TIMES DAILY
Qty: 90 TABLET | Refills: 3 | Status: SHIPPED | OUTPATIENT
Start: 2023-11-21 | End: 2024-03-20

## 2023-11-21 NOTE — TELEPHONE ENCOUNTER
Gabapentin 800 mg    [facility]    Last check up 10/10/23    Health Maintenance   Topic Date Due    Hepatitis B vaccine (1 of 3 - 3-dose series) Never done    Pneumococcal 0-64 years Vaccine (1 - PCV) Never done    Hepatitis A vaccine (1 of 2 - Risk 2-dose series) Never done    Colorectal Cancer Screen  Never done    Lipids  08/20/2021    GFR test (Diabetes, CKD 3-4, OR last GFR 15-59)  08/20/2021    Diabetic retinal exam  11/01/2022    Flu vaccine (1) Never done    COVID-19 Vaccine (3 - 2023-24 season) 09/01/2023    Diabetic foot exam  05/22/2024    A1C test (Diabetic or Prediabetic)  05/22/2024    Diabetic Alb to Cr ratio (uACR) test  05/22/2024    Depression Screen  10/10/2024    DTaP/Tdap/Td vaccine (2 - Td or Tdap) 07/31/2026    HIV screen  Completed    Hib vaccine  Aged Out    Meningococcal (ACWY) vaccine  Aged Out             (applicable per patient's age: Cancer Screenings, Depression Screening, Fall Risk Screening, Immunizations)    Hemoglobin A1C (%)   Date Value   05/22/2023 6.0   11/21/2022 6.4   05/16/2022 6.2     LDL Cholesterol (mg/dL)   Date Value   08/20/2020 45     AST (U/L)   Date Value   08/20/2020 24     ALT (U/L)   Date Value   08/20/2020 24     BUN (mg/dL)   Date Value   08/20/2020 15      (goal A1C is < 7)   (goal LDL is <100) need 30-50% reduction from baseline     BP Readings from Last 3 Encounters:   10/10/23 112/80   05/22/23 110/82   11/21/22 108/64    (goal /80)      All Future Testing planned in [facility]:  Lab Frequency Next Occurrence   CBC with Auto Differential Once 11/21/2023   Comprehensive Metabolic Panel Once 41/09/3442   Lipid Panel Once 11/21/2023   PSA Screening Once 11/21/2022       Next Visit Date:  Future Appointments   Date Time Provider [address]   4/11/2024  2:00 PM Tara Antonio, APRN - [facility]            Patient Active Problem List:     HTN (hypertension)     Arthritis     Gout     Mass of right wrist     Gastroesophageal reflux

## 2023-11-22 RX ORDER — HYDROXYZINE PAMOATE 25 MG/1
25 CAPSULE ORAL 3 TIMES DAILY PRN
Qty: 90 CAPSULE | Refills: 1 | Status: SHIPPED | OUTPATIENT
Start: 2023-11-22

## 2024-01-23 RX ORDER — HYDROXYZINE PAMOATE 25 MG/1
CAPSULE ORAL
Qty: 90 CAPSULE | Refills: 1 | Status: SHIPPED | OUTPATIENT
Start: 2024-01-23

## 2024-01-23 NOTE — TELEPHONE ENCOUNTER
Last OV 10/10/23    Requesting refill on hydroxyzine thru surescripts.  Rx pending     Next OV 04/11/24

## 2024-03-14 ENCOUNTER — OFFICE VISIT (OUTPATIENT)
Dept: FAMILY MEDICINE CLINIC | Age: 49
End: 2024-03-14

## 2024-03-14 VITALS
OXYGEN SATURATION: 98 % | SYSTOLIC BLOOD PRESSURE: 114 MMHG | HEART RATE: 95 BPM | HEIGHT: 74 IN | BODY MASS INDEX: 36.72 KG/M2 | DIASTOLIC BLOOD PRESSURE: 76 MMHG | WEIGHT: 286.1 LBS

## 2024-03-14 DIAGNOSIS — K21.9 GASTROESOPHAGEAL REFLUX DISEASE WITHOUT ESOPHAGITIS: ICD-10-CM

## 2024-03-14 DIAGNOSIS — I10 PRIMARY HYPERTENSION: ICD-10-CM

## 2024-03-14 DIAGNOSIS — E11.42 TYPE 2 DIABETES MELLITUS WITH DIABETIC POLYNEUROPATHY, WITHOUT LONG-TERM CURRENT USE OF INSULIN (HCC): Primary | ICD-10-CM

## 2024-03-14 DIAGNOSIS — F41.1 GAD (GENERALIZED ANXIETY DISORDER): ICD-10-CM

## 2024-03-14 DIAGNOSIS — E08.621 DIABETIC ULCER OF TOE OF RIGHT FOOT ASSOCIATED WITH DIABETES MELLITUS DUE TO UNDERLYING CONDITION, LIMITED TO BREAKDOWN OF SKIN (HCC): ICD-10-CM

## 2024-03-14 DIAGNOSIS — Z86.19 HISTORY OF ACUTE HEPATITIS: ICD-10-CM

## 2024-03-14 DIAGNOSIS — L97.511 DIABETIC ULCER OF TOE OF RIGHT FOOT ASSOCIATED WITH DIABETES MELLITUS DUE TO UNDERLYING CONDITION, LIMITED TO BREAKDOWN OF SKIN (HCC): ICD-10-CM

## 2024-03-14 DIAGNOSIS — E78.2 MIXED HYPERLIPIDEMIA: ICD-10-CM

## 2024-03-14 LAB — HBA1C MFR BLD: 8.2 %

## 2024-03-14 PROCEDURE — 3074F SYST BP LT 130 MM HG: CPT | Performed by: NURSE PRACTITIONER

## 2024-03-14 PROCEDURE — 83036 HEMOGLOBIN GLYCOSYLATED A1C: CPT | Performed by: NURSE PRACTITIONER

## 2024-03-14 PROCEDURE — 99214 OFFICE O/P EST MOD 30 MIN: CPT | Performed by: NURSE PRACTITIONER

## 2024-03-14 PROCEDURE — 3078F DIAST BP <80 MM HG: CPT | Performed by: NURSE PRACTITIONER

## 2024-03-14 PROCEDURE — 3052F HG A1C>EQUAL 8.0%<EQUAL 9.0%: CPT | Performed by: NURSE PRACTITIONER

## 2024-03-14 RX ORDER — ATORVASTATIN CALCIUM 80 MG/1
80 TABLET, FILM COATED ORAL DAILY
Qty: 90 TABLET | Refills: 1 | Status: SHIPPED | OUTPATIENT
Start: 2024-03-14

## 2024-03-14 RX ORDER — LISINOPRIL 40 MG/1
40 TABLET ORAL DAILY
Qty: 90 TABLET | Refills: 1 | Status: SHIPPED | OUTPATIENT
Start: 2024-03-14

## 2024-03-14 RX ORDER — CLONIDINE HYDROCHLORIDE 0.2 MG/1
0.2 TABLET ORAL 2 TIMES DAILY
Qty: 180 TABLET | Refills: 1 | Status: SHIPPED | OUTPATIENT
Start: 2024-03-14

## 2024-03-14 RX ORDER — HYDROCHLOROTHIAZIDE 25 MG/1
25 TABLET ORAL EVERY MORNING
Qty: 90 TABLET | Refills: 1 | Status: SHIPPED | OUTPATIENT
Start: 2024-03-14

## 2024-03-14 RX ORDER — METFORMIN HYDROCHLORIDE 500 MG/1
1000 TABLET, EXTENDED RELEASE ORAL
Qty: 60 TABLET | Refills: 2 | Status: SHIPPED | OUTPATIENT
Start: 2024-03-14

## 2024-03-14 RX ORDER — PANTOPRAZOLE SODIUM 40 MG/1
40 TABLET, DELAYED RELEASE ORAL DAILY
Qty: 90 TABLET | Refills: 1 | Status: SHIPPED | OUTPATIENT
Start: 2024-03-14

## 2024-03-14 ASSESSMENT — PATIENT HEALTH QUESTIONNAIRE - PHQ9
1. LITTLE INTEREST OR PLEASURE IN DOING THINGS: 0
SUM OF ALL RESPONSES TO PHQ9 QUESTIONS 1 & 2: 0
SUM OF ALL RESPONSES TO PHQ QUESTIONS 1-9: 0
SUM OF ALL RESPONSES TO PHQ QUESTIONS 1-9: 0
2. FEELING DOWN, DEPRESSED OR HOPELESS: 0
SUM OF ALL RESPONSES TO PHQ QUESTIONS 1-9: 0
SUM OF ALL RESPONSES TO PHQ QUESTIONS 1-9: 0

## 2024-03-14 ASSESSMENT — ENCOUNTER SYMPTOMS
VOMITING: 0
COUGH: 0
BLOOD IN STOOL: 0
BACK PAIN: 0
DIARRHEA: 0
SHORTNESS OF BREATH: 0
CONSTIPATION: 0
HEARTBURN: 0
ABDOMINAL PAIN: 0
SORE THROAT: 0
NAUSEA: 0

## 2024-03-14 NOTE — PROGRESS NOTES
current episode started more than 1 month ago. This is a chronic problem.   The onset of the illness is precipitated by emotional stress. The degree of incapacity that he is experiencing as a consequence of his illness is mild. Additional symptoms of the illness do not include insomnia, headaches, abdominal pain or seizures. He does not admit to suicidal ideas. He does not have a plan to attempt suicide. He does not contemplate harming himself. He has not already injured self. He does not contemplate injuring another person. He has not already  injured another person.   Gastroesophageal Reflux  He reports no abdominal pain, no chest pain, no coughing, no heartburn, no nausea or no sore throat. This is a chronic problem. The current episode started more than 1 year ago. The problem occurs rarely. The problem has been gradually improving. Nothing aggravates the symptoms. Risk factors include caffeine use, lack of exercise and obesity. He has tried a PPI for the symptoms. The treatment provided moderate relief.   Hyperlipidemia  This is a chronic problem. The current episode started more than 1 year ago. The problem is controlled. Recent lipid tests were reviewed and are normal. Exacerbating diseases include diabetes and obesity. Factors aggravating his hyperlipidemia include fatty foods. Pertinent negatives include no chest pain or shortness of breath. Current antihyperlipidemic treatment includes statins. Compliance problems include adherence to diet and adherence to exercise.  Risk factors for coronary artery disease include diabetes mellitus, dyslipidemia, hypertension, male sex and obesity.       Past Medical History:     Past Medical History:   Diagnosis Date    Chicken pox     Chronic back pain     Diabetes mellitus (HCC)     Gout     Headache(784.0)     Hepatitis B 03/30/2017    Core antibody IgG/IgM -- positive    Hepatitis C 03/30/2017    Viral RNA by PCR -- detected    Hernia     Hypertension

## 2024-03-18 RX ORDER — GABAPENTIN 800 MG/1
800 TABLET ORAL 4 TIMES DAILY
Qty: 120 TABLET | Refills: 2 | Status: SHIPPED | OUTPATIENT
Start: 2024-03-18 | End: 2024-06-16

## 2024-03-21 RX ORDER — HYDROXYZINE PAMOATE 25 MG/1
CAPSULE ORAL
Qty: 90 CAPSULE | Refills: 1 | Status: SHIPPED | OUTPATIENT
Start: 2024-03-21

## 2024-03-21 NOTE — TELEPHONE ENCOUNTER
Last OV: 3/14/2024 chronic   Last RX:    Next scheduled apt: 4/11/2024  6 months chronic           Surescript requesting a refill

## 2024-04-02 ENCOUNTER — HOSPITAL ENCOUNTER (OUTPATIENT)
Age: 49
Discharge: HOME | End: 2024-04-02
Payer: COMMERCIAL

## 2024-04-02 DIAGNOSIS — Z86.19 HISTORY OF ACUTE HEPATITIS: ICD-10-CM

## 2024-04-02 LAB
HAV IGM SERPL QL IA: NONREACTIVE
HBV CORE IGM SERPL QL IA: NONREACTIVE
HBV SURFACE AG SERPL QL IA: NONREACTIVE
HCV AB SERPL QL IA: REACTIVE

## 2024-04-02 PROCEDURE — 36415 COLL VENOUS BLD VENIPUNCTURE: CPT

## 2024-04-02 PROCEDURE — 80074 ACUTE HEPATITIS PANEL: CPT

## 2024-04-05 ENCOUNTER — TELEPHONE (OUTPATIENT)
Dept: FAMILY MEDICINE CLINIC | Age: 49
End: 2024-04-05

## 2024-04-05 DIAGNOSIS — B19.20 HEPATITIS C TEST POSITIVE: Primary | ICD-10-CM

## 2024-04-05 NOTE — TELEPHONE ENCOUNTER
Advised pt of provider's notes  Pt stated he is surprised, her thought he was cured. Pt stated five years ago he was treated and told he was cured. Please advise. Pt stated he wanted to see a GI close to [place].

## 2024-04-05 NOTE — TELEPHONE ENCOUNTER
----- Message from Alan Antonio, DNP sent at 4/3/2024  1:58 PM EDT -----  Please let pt know that he is still positive for Hep C. He will need to see GI if he would like to talk about treatment.

## 2024-04-05 NOTE — TELEPHONE ENCOUNTER
I am sorry that he is going through this.  He could have been treated and being cured, however with even 1 relapse he could have been reexposed.  Also hepatitis C is passed through blood and body fluids, so if he had intercourse with someone who was hep C positive he could have contracted it this way.

## 2024-04-12 ENCOUNTER — TELEPHONE (OUTPATIENT)
Dept: FAMILY MEDICINE CLINIC | Age: 49
End: 2024-04-12

## 2024-04-12 DIAGNOSIS — B19.20 HEPATITIS C TEST POSITIVE: Primary | ICD-10-CM

## 2024-04-12 NOTE — TELEPHONE ENCOUNTER
----- Message from Cheryl Desir sent at 4/12/2024  3:05 PM EDT -----  Subject: Referral Request    Reason for referral request? Patient is reaching out to PCP, Alan Antonio to let him know the referral to Gastroenterology for patients   Hepatitis C that was done on 4.5.2024, when the patient reached out to   schedule Gastroenterology told him they did not have the referral and they   do not treat for Hepatitis C at there facility. Patient is asking for   another referral to the gastroenterologists he seen in 2017 a Dr. Elio Hoffmann Jr. Patient says he treated him for this Hep C at that time. Please   reach out to the patient   Provider patient wants to be referred to(if known):     Provider Phone Number(if known):    Additional Information for Provider?   ---------------------------------------------------------------------------  --------------  CALL BACK INFO    [phone]; OK to leave message on voicemail  ---------------------------------------------------------------------------  --------------

## 2024-04-15 DIAGNOSIS — E11.42 TYPE 2 DIABETES MELLITUS WITH DIABETIC POLYNEUROPATHY, WITHOUT LONG-TERM CURRENT USE OF INSULIN (HCC): ICD-10-CM

## 2024-04-15 RX ORDER — ALLOPURINOL 300 MG/1
300 TABLET ORAL DAILY
Qty: 90 TABLET | Refills: 1 | Status: SHIPPED | OUTPATIENT
Start: 2024-04-15

## 2024-05-17 RX ORDER — TIZANIDINE 4 MG/1
TABLET ORAL
Qty: 90 TABLET | Refills: 1 | Status: SHIPPED | OUTPATIENT
Start: 2024-05-17

## 2024-05-17 RX ORDER — HYDROXYZINE PAMOATE 25 MG/1
CAPSULE ORAL
Qty: 90 CAPSULE | Refills: 1 | Status: SHIPPED | OUTPATIENT
Start: 2024-05-17

## 2024-05-17 NOTE — TELEPHONE ENCOUNTER
Last OV: 3/14/2024  chronic   Last RX:    Next scheduled apt: 6/17/2024   3 months           Surescript requesting a refill

## 2024-06-10 DIAGNOSIS — E11.42 TYPE 2 DIABETES MELLITUS WITH DIABETIC POLYNEUROPATHY, WITHOUT LONG-TERM CURRENT USE OF INSULIN (HCC): ICD-10-CM

## 2024-06-10 RX ORDER — METFORMIN HYDROCHLORIDE 500 MG/1
1000 TABLET, EXTENDED RELEASE ORAL
Qty: 60 TABLET | Refills: 2 | Status: SHIPPED | OUTPATIENT
Start: 2024-06-10

## 2024-06-10 NOTE — TELEPHONE ENCOUNTER
Last OV 3/14/24     Next OV 6/17/24    Requesting refill on metformin thru surescripts.  Rx pending

## 2024-06-17 ENCOUNTER — OFFICE VISIT (OUTPATIENT)
Dept: FAMILY MEDICINE CLINIC | Age: 49
End: 2024-06-17
Payer: COMMERCIAL

## 2024-06-17 VITALS
OXYGEN SATURATION: 95 % | DIASTOLIC BLOOD PRESSURE: 76 MMHG | BODY MASS INDEX: 36.7 KG/M2 | WEIGHT: 286 LBS | HEIGHT: 74 IN | SYSTOLIC BLOOD PRESSURE: 110 MMHG | HEART RATE: 101 BPM
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
## 2024-06-17 DIAGNOSIS — E11.42 TYPE 2 DIABETES MELLITUS WITH DIABETIC POLYNEUROPATHY, WITHOUT LONG-TERM CURRENT USE OF INSULIN (HCC): Primary | ICD-10-CM

## 2024-06-17 DIAGNOSIS — M15.9 GENERALIZED OSTEOARTHRITIS OF MULTIPLE SITES: ICD-10-CM

## 2024-06-17 DIAGNOSIS — Z11.4 SCREENING FOR HIV (HUMAN IMMUNODEFICIENCY VIRUS): ICD-10-CM

## 2024-06-17 DIAGNOSIS — I87.2 VENOUS STASIS DERMATITIS: ICD-10-CM

## 2024-06-17 DIAGNOSIS — S80.862A TICK BITE OF LEFT LOWER LEG, INITIAL ENCOUNTER: ICD-10-CM

## 2024-06-17 PROBLEM — G62.9 NEUROPATHY: Status: ACTIVE | Noted: 2024-06-17

## 2024-06-17 PROBLEM — E11.9 DIABETES MELLITUS (HCC): Status: ACTIVE | Noted: 2022-05-16

## 2024-06-17 LAB — HBA1C MFR BLD: 7.7 %

## 2024-06-17 PROCEDURE — 3078F DIAST BP <80 MM HG: CPT | Performed by: NURSE PRACTITIONER

## 2024-06-17 PROCEDURE — 83036 HEMOGLOBIN GLYCOSYLATED A1C: CPT | Performed by: NURSE PRACTITIONER

## 2024-06-17 PROCEDURE — G8427 DOCREV CUR MEDS BY ELIG CLIN: HCPCS | Performed by: NURSE PRACTITIONER

## 2024-06-17 PROCEDURE — 3074F SYST BP LT 130 MM HG: CPT | Performed by: NURSE PRACTITIONER

## 2024-06-17 PROCEDURE — 2022F DILAT RTA XM EVC RTNOPTHY: CPT | Performed by: NURSE PRACTITIONER

## 2024-06-17 PROCEDURE — 3051F HG A1C>EQUAL 7.0%<8.0%: CPT | Performed by: NURSE PRACTITIONER

## 2024-06-17 PROCEDURE — 99214 OFFICE O/P EST MOD 30 MIN: CPT | Performed by: NURSE PRACTITIONER

## 2024-06-17 PROCEDURE — 1036F TOBACCO NON-USER: CPT | Performed by: NURSE PRACTITIONER

## 2024-06-17 PROCEDURE — G8417 CALC BMI ABV UP PARAM F/U: HCPCS | Performed by: NURSE PRACTITIONER

## 2024-06-17 RX ORDER — DOXYCYCLINE HYCLATE 100 MG
100 TABLET ORAL 2 TIMES DAILY
Qty: 14 TABLET | Refills: 0 | Status: SHIPPED | OUTPATIENT
Start: 2024-06-17 | End: 2024-06-24

## 2024-06-17 RX ORDER — MELOXICAM 15 MG/1
15 TABLET ORAL DAILY
Qty: 90 TABLET | Refills: 1 | Status: SHIPPED | OUTPATIENT
Start: 2024-06-17

## 2024-06-17 RX ORDER — GLIPIZIDE 5 MG/1
5 TABLET, FILM COATED, EXTENDED RELEASE ORAL DAILY
Qty: 30 TABLET | Refills: 2 | Status: SHIPPED | OUTPATIENT
Start: 2024-06-17

## 2024-06-17 SDOH — ECONOMIC STABILITY: FOOD INSECURITY: WITHIN THE PAST 12 MONTHS, YOU WORRIED THAT YOUR FOOD WOULD RUN OUT BEFORE YOU GOT MONEY TO BUY MORE.: NEVER TRUE

## 2024-06-17 SDOH — ECONOMIC STABILITY: FOOD INSECURITY: WITHIN THE PAST 12 MONTHS, THE FOOD YOU BOUGHT JUST DIDN'T LAST AND YOU DIDN'T HAVE MONEY TO GET MORE.: NEVER TRUE

## 2024-06-17 SDOH — ECONOMIC STABILITY: INCOME INSECURITY: HOW HARD IS IT FOR YOU TO PAY FOR THE VERY BASICS LIKE FOOD, HOUSING, MEDICAL CARE, AND HEATING?: NOT HARD AT ALL

## 2024-06-17 ASSESSMENT — ENCOUNTER SYMPTOMS
DIARRHEA: 0
VISUAL CHANGE: 0
SHORTNESS OF BREATH: 0
COUGH: 0
NAUSEA: 0
VOMITING: 0

## 2024-06-17 NOTE — PROGRESS NOTES
12:23 AM    HGB 15.2 08/20/2020 04:14 PM    HCT 45.1 08/20/2020 04:14 PM    MCV 87.3 08/20/2020 04:14 PM    MCH 29.5 08/20/2020 04:14 PM    MCHC 33.7 08/20/2020 04:14 PM    RDW 13.9 08/20/2020 04:14 PM     08/20/2020 04:14 PM     12/26/2011 12:23 AM    MPV NOT REPORTED 08/20/2020 04:14 PM     Lab Results   Component Value Date/Time    TSH 2.82 03/09/2015 04:05 PM     Lab Results   Component Value Date/Time    CHOL 92 08/20/2020 04:14 PM    LDL 45 08/20/2020 04:14 PM    HDL 22 08/20/2020 04:14 PM    LABA1C 7.7 06/17/2024 03:00 PM          Assessment & Plan        Diagnosis Orders   1. Type 2 diabetes mellitus with diabetic polyneuropathy, without long-term current use of insulin (HCC)   --A1c=7.7% (previously 8.2%). Continue better nutrition. D/C metformin due to side effects (GI). Will start on glipizide XL 5mg daily.  POCT glycosylated hemoglobin (Hb A1C)      2. Tick bite of left lower leg, initial encounter   --will start on doxycycline.       3. Venous stasis dermatitis   --will send for ERNESTO       4.     Generalized OA multiple sites          ---will start on meloxicam 15mg daily                Completed Refills   Requested Prescriptions     Signed Prescriptions Disp Refills    glipiZIDE (GLUCOTROL XL) 5 MG extended release tablet 30 tablet 2     Sig: Take 1 tablet by mouth daily    doxycycline hyclate (VIBRA-TABS) 100 MG tablet 14 tablet 0     Sig: Take 1 tablet by mouth 2 times daily for 7 days     Return in about 3 months (around 9/17/2024) for DM.  Orders Placed This Encounter   Medications    glipiZIDE (GLUCOTROL XL) 5 MG extended release tablet     Sig: Take 1 tablet by mouth daily     Dispense:  30 tablet     Refill:  2    doxycycline hyclate (VIBRA-TABS) 100 MG tablet     Sig: Take 1 tablet by mouth 2 times daily for 7 days     Dispense:  14 tablet     Refill:  0     Orders Placed This Encounter   Procedures    POCT glycosylated hemoglobin (Hb A1C)         There are no Patient Instructions

## 2024-07-15 ENCOUNTER — HOSPITAL ENCOUNTER (OUTPATIENT)
Age: 49
Discharge: HOME | End: 2024-07-17
Payer: COMMERCIAL

## 2024-07-15 ENCOUNTER — TELEPHONE (OUTPATIENT)
Dept: FAMILY MEDICINE CLINIC | Age: 49
End: 2024-07-15

## 2024-07-15 ENCOUNTER — HOSPITAL ENCOUNTER (OUTPATIENT)
Age: 49
Discharge: HOME | End: 2024-07-15
Payer: COMMERCIAL

## 2024-07-15 ENCOUNTER — HOSPITAL ENCOUNTER (OUTPATIENT)
Dept: GENERAL RADIOLOGY | Age: 49
Discharge: HOME | End: 2024-07-17
Payer: COMMERCIAL

## 2024-07-15 ENCOUNTER — OFFICE VISIT (OUTPATIENT)
Dept: FAMILY MEDICINE CLINIC | Age: 49
End: 2024-07-15
Payer: COMMERCIAL

## 2024-07-15 DIAGNOSIS — R20.2 BILATERAL NUMBNESS AND TINGLING OF ARMS AND LEGS: ICD-10-CM

## 2024-07-15 DIAGNOSIS — R20.0 BILATERAL NUMBNESS AND TINGLING OF ARMS AND LEGS: ICD-10-CM

## 2024-07-15 DIAGNOSIS — R20.2 BILATERAL NUMBNESS AND TINGLING OF ARMS AND LEGS: Primary | ICD-10-CM

## 2024-07-15 DIAGNOSIS — M79.89 SWELLING OF BOTH LOWER EXTREMITIES: ICD-10-CM

## 2024-07-15 DIAGNOSIS — Z11.4 SCREENING FOR HIV (HUMAN IMMUNODEFICIENCY VIRUS): ICD-10-CM

## 2024-07-15 DIAGNOSIS — R20.0 BILATERAL NUMBNESS AND TINGLING OF ARMS AND LEGS: Primary | ICD-10-CM

## 2024-07-15 LAB — HIV 1+2 AB+HIV1 P24 AG SERPL QL IA: NONREACTIVE

## 2024-07-15 PROCEDURE — G8427 DOCREV CUR MEDS BY ELIG CLIN: HCPCS | Performed by: NURSE PRACTITIONER

## 2024-07-15 PROCEDURE — 1036F TOBACCO NON-USER: CPT | Performed by: NURSE PRACTITIONER

## 2024-07-15 PROCEDURE — 72050 X-RAY EXAM NECK SPINE 4/5VWS: CPT

## 2024-07-15 PROCEDURE — G8417 CALC BMI ABV UP PARAM F/U: HCPCS | Performed by: NURSE PRACTITIONER

## 2024-07-15 PROCEDURE — 36415 COLL VENOUS BLD VENIPUNCTURE: CPT

## 2024-07-15 PROCEDURE — 99213 OFFICE O/P EST LOW 20 MIN: CPT | Performed by: NURSE PRACTITIONER

## 2024-07-15 PROCEDURE — 87389 HIV-1 AG W/HIV-1&-2 AB AG IA: CPT

## 2024-07-15 RX ORDER — FUROSEMIDE 20 MG/1
20 TABLET ORAL DAILY
Qty: 60 TABLET | Refills: 2 | Status: SHIPPED | OUTPATIENT
Start: 2024-07-15

## 2024-07-15 ASSESSMENT — ENCOUNTER SYMPTOMS
NAUSEA: 0
SHORTNESS OF BREATH: 0
DIARRHEA: 0
VOMITING: 0
COUGH: 0

## 2024-07-15 NOTE — PROGRESS NOTES
do not resolve or worsen, return to office.             Completed Refills   Requested Prescriptions     Signed Prescriptions Disp Refills    furosemide (LASIX) 20 MG tablet 60 tablet 2     Sig: Take 1 tablet by mouth daily     No follow-ups on file.  Orders Placed This Encounter   Medications    furosemide (LASIX) 20 MG tablet     Sig: Take 1 tablet by mouth daily     Dispense:  60 tablet     Refill:  2     Orders Placed This Encounter   Procedures    XR CERVICAL SPINE (4-5 VIEWS)     Standing Status:   Future     Number of Occurrences:   1     Standing Expiration Date:   7/15/2025     Order Specific Question:   Reason for exam:     Answer:   numbness and tingling bilat arms         Patient Instructions     SURVEY:    You may be receiving a survey from [employer] regarding your visit today.    Please complete the survey to enable us to provide the highest quality of care to you and your family.    If you cannot score us a very good on any question, please call the office to discuss how we could have made your experience a very good one.    Thank you.     Electronically signed by Alan Antonio DNP,APRN,CNP  on 7/15/2024 at 11:15 AM           Completed Refills   Requested Prescriptions     Signed Prescriptions Disp Refills    furosemide (LASIX) 20 MG tablet 60 tablet 2     Sig: Take 1 tablet by mouth daily

## 2024-07-15 NOTE — TELEPHONE ENCOUNTER
----- Message from Alan Antonio DNP sent at 7/15/2024  4:55 PM EDT -----  Please let patient know that his neck x-ray showed moderate degenerative change at C5 and C6.  This is about the same as it was several years ago.  There is no mention of a bulging disc.  HIV screen was negative

## 2024-07-18 ENCOUNTER — TELEPHONE (OUTPATIENT)
Dept: FAMILY MEDICINE CLINIC | Age: 49
End: 2024-07-18

## 2024-07-18 DIAGNOSIS — M54.2 CERVICAL PAIN: ICD-10-CM

## 2024-07-18 DIAGNOSIS — R20.2 BILATERAL NUMBNESS AND TINGLING OF ARMS AND LEGS: Primary | ICD-10-CM

## 2024-07-18 DIAGNOSIS — R20.0 BILATERAL NUMBNESS AND TINGLING OF ARMS AND LEGS: Primary | ICD-10-CM

## 2024-07-18 NOTE — TELEPHONE ENCOUNTER
----- Message from Jadyn Echeverria MA sent at 7/16/2024  8:51 AM EDT -----  Advised pt of provider's notes, voiced understanding   Pt would like to know what would be the next step will be?

## 2024-07-23 RX ORDER — GABAPENTIN 800 MG/1
800 TABLET ORAL 4 TIMES DAILY
Qty: 120 TABLET | Refills: 2 | Status: SHIPPED | OUTPATIENT
Start: 2024-07-23 | End: 2024-10-21

## 2024-07-23 RX ORDER — HYDROXYZINE PAMOATE 25 MG/1
CAPSULE ORAL
Qty: 90 CAPSULE | Refills: 1 | Status: SHIPPED | OUTPATIENT
Start: 2024-07-23

## 2024-07-23 NOTE — TELEPHONE ENCOUNTER
Patient asking for new scripts for Vistaril, Gabapentin, HCTZ - patient uses [facility] in [place]

## 2024-07-23 NOTE — TELEPHONE ENCOUNTER
Last OV: 7/15/2024 bilateral leg swelling 10/10/23 ANSLEY   Last RX:    Next scheduled apt: 9/20/2024   3 MONTHS DM         Pt requesting a refill   Medication pending for approval

## 2024-08-01 ENCOUNTER — TELEPHONE (OUTPATIENT)
Dept: FAMILY MEDICINE CLINIC | Age: 49
End: 2024-08-01

## 2024-08-01 DIAGNOSIS — R20.0 BILATERAL NUMBNESS AND TINGLING OF ARMS AND LEGS: Primary | ICD-10-CM

## 2024-08-01 DIAGNOSIS — G89.29 CHRONIC BILATERAL LOW BACK PAIN WITHOUT SCIATICA: ICD-10-CM

## 2024-08-01 DIAGNOSIS — R20.2 BILATERAL NUMBNESS AND TINGLING OF ARMS AND LEGS: Primary | ICD-10-CM

## 2024-08-01 DIAGNOSIS — M54.50 CHRONIC BILATERAL LOW BACK PAIN WITHOUT SCIATICA: ICD-10-CM

## 2024-08-12 ENCOUNTER — HOSPITAL ENCOUNTER (OUTPATIENT)
Dept: VASCULAR LAB | Age: 49
Discharge: HOME | End: 2024-08-14
Payer: COMMERCIAL

## 2024-08-12 ENCOUNTER — TELEPHONE (OUTPATIENT)
Dept: FAMILY MEDICINE CLINIC | Age: 49
End: 2024-08-12

## 2024-08-12 DIAGNOSIS — I87.2 VENOUS STASIS DERMATITIS: ICD-10-CM

## 2024-08-12 PROCEDURE — 93922 UPR/L XTREMITY ART 2 LEVELS: CPT

## 2024-08-12 NOTE — TELEPHONE ENCOUNTER
Patient was notified of results, states still having swelling in his legs and has gotten worse. Requested an appt for 8-27-24- pt is scheduled @ 10:40am

## 2024-08-12 NOTE — TELEPHONE ENCOUNTER
----- Message from Alan Antonio DNP sent at 8/12/2024  3:03 PM EDT -----  Please let patient know that his ankle-brachial index is normal on both legs.

## 2024-08-27 ENCOUNTER — OFFICE VISIT (OUTPATIENT)
Dept: FAMILY MEDICINE CLINIC | Age: 49
End: 2024-08-27

## 2024-08-27 ENCOUNTER — OFFICE VISIT (OUTPATIENT)
Dept: PAIN MANAGEMENT | Age: 49
End: 2024-08-27
Payer: COMMERCIAL

## 2024-08-27 VITALS — HEART RATE: 95 BPM | DIASTOLIC BLOOD PRESSURE: 76 MMHG | SYSTOLIC BLOOD PRESSURE: 136 MMHG | OXYGEN SATURATION: 96 %

## 2024-08-27 VITALS
WEIGHT: 286 LBS | DIASTOLIC BLOOD PRESSURE: 78 MMHG | RESPIRATION RATE: 18 BRPM | SYSTOLIC BLOOD PRESSURE: 122 MMHG | OXYGEN SATURATION: 98 % | BODY MASS INDEX: 36.72 KG/M2

## 2024-08-27 DIAGNOSIS — M51.36 LUMBAR DEGENERATIVE DISC DISEASE: ICD-10-CM

## 2024-08-27 DIAGNOSIS — M79.89 SWELLING OF BOTH LOWER EXTREMITIES: Primary | ICD-10-CM

## 2024-08-27 DIAGNOSIS — M47.817 LUMBOSACRAL SPONDYLOSIS WITHOUT MYELOPATHY: Primary | ICD-10-CM

## 2024-08-27 DIAGNOSIS — L25.9 CONTACT DERMATITIS OF HAND: ICD-10-CM

## 2024-08-27 DIAGNOSIS — M54.12 CERVICAL RADICULOPATHY: ICD-10-CM

## 2024-08-27 DIAGNOSIS — M79.18 MYOFASCIAL PAIN SYNDROME: ICD-10-CM

## 2024-08-27 PROCEDURE — G8427 DOCREV CUR MEDS BY ELIG CLIN: HCPCS | Performed by: STUDENT IN AN ORGANIZED HEALTH CARE EDUCATION/TRAINING PROGRAM

## 2024-08-27 PROCEDURE — 3078F DIAST BP <80 MM HG: CPT | Performed by: STUDENT IN AN ORGANIZED HEALTH CARE EDUCATION/TRAINING PROGRAM

## 2024-08-27 PROCEDURE — G8417 CALC BMI ABV UP PARAM F/U: HCPCS | Performed by: STUDENT IN AN ORGANIZED HEALTH CARE EDUCATION/TRAINING PROGRAM

## 2024-08-27 PROCEDURE — 99204 OFFICE O/P NEW MOD 45 MIN: CPT | Performed by: STUDENT IN AN ORGANIZED HEALTH CARE EDUCATION/TRAINING PROGRAM

## 2024-08-27 PROCEDURE — 1036F TOBACCO NON-USER: CPT | Performed by: STUDENT IN AN ORGANIZED HEALTH CARE EDUCATION/TRAINING PROGRAM

## 2024-08-27 PROCEDURE — 3074F SYST BP LT 130 MM HG: CPT | Performed by: STUDENT IN AN ORGANIZED HEALTH CARE EDUCATION/TRAINING PROGRAM

## 2024-08-27 ASSESSMENT — ENCOUNTER SYMPTOMS
VOMITING: 0
COUGH: 0
DIARRHEA: 0
SHORTNESS OF BREATH: 0
NAUSEA: 0

## 2024-08-27 NOTE — PROGRESS NOTES
Chronic Pain Clinic Note     Encounter Date: 8/27/2024     SUBJECTIVE:  Chief Complaint   Patient presents with    New Patient    Back Pain    Numbness       History of Present Illness:   Cristóbal Rajan is a 48 y.o. male who presents with back pain and neck pain.    Medication Refill: n/a     Current Complaints of Pain:   Location: Low back, neck  Radiation: Left arm  Severity: moderate  Pain Numerical Score -  6   Average: 6     Highest: 9-10  Lowest: 3  Character/Quality: Complains of pain that is tight, tingling, sharp & shooting at times   Timing: Constant, worse with prolonged sitting/standing/walking  Associated symptoms:   Numbness: Left arm  Weakness: No  Exacerbating factors: Standing, walking  Alleviating factors: heating pad, hot showers  Length of time pain has been present: Started on - chronic   Inciting event/injury: No  Bowel/Bladder incontinence: no  Falls: No  Physical Therapy: Yes    History of Interventions:   Surgery: No previous lumbar/cervical surgeries  Injections: Yes- Dr Campbell    Imaging:    XR Lumbar 7/29/24  XR cervical 7/15/24    Past Medical History:   Diagnosis Date    Chicken pox     Chronic back pain     Diabetes mellitus (HCC)     Gout     Headache(784.0)     Hepatitis B 03/30/2017    Core antibody IgG/IgM -- positive    Hepatitis C 03/30/2017    Viral RNA by PCR -- detected    Hernia     Hypertension     Osteoarthritis        Past Surgical History:   Procedure Laterality Date    HAND SURGERY Right     cyst removal    PAIN MANAGEMENT PROCEDURE N/A 12/17/2021    L5-S1 INTERLAMINAR EPIDURAL INJECTION performed by Paresh Campbell MD at [facility] OR       Family History   Problem Relation Age of Onset    Diabetes Mother     Diabetes Father     Heart Disease Brother        Social History     Socioeconomic History    Marital status:      Spouse name: Not on file    Number of children: Not on file    Years of education: Not on file    Highest education level: Not on file  (VISTARIL) 25 MG capsule TAKE 1 CAPSULE BY MOUTH THREE TIMES DAILY AS NEEDED FOR ITCHING    Lancets MISC Check blood sugar twice daily. Whatever brand covered by insurance.    lisinopril (PRINIVIL;ZESTRIL) 40 mg, Oral, DAILY    meloxicam (MOBIC) 15 mg, Oral, DAILY    pantoprazole (PROTONIX) 40 mg, Oral, DAILY, TAKE 1 TABLET BY MOUTH EVERY DAY    tiZANidine (ZANAFLEX) 4 MG tablet TAKE 1 TABLET BY MOUTH EVERY NIGHT AS NEEDED for neck pain or FOR MUSCLE SPASMS       Allergies   Allergen Reactions    Bee Venom Anaphylaxis     Other reaction(s): AOF       Review of Systems:   Constitutional: negative for weight changes or fevers  Cardiovascular: negative for chest pain, palpitations, irregular heart beat  Respiratory: negative for dyspnea, cough, wheezing  Gastrointestinal: negative for constipation, diarrhea, nausea  Genitourinary: negative for bowel/bladder incontinence   Musculoskeletal: positive for low back pain  Neurological: negative for radicular leg pain, leg weakness or numbness/tingling  Behavioral/Psych: negative for anxiety/depression   Hematological: negative for abnormal bleeding, anticoagulation use or antiplatelet use  All other systems reviewed and are negative    OBJECTIVE:    Vitals:    08/27/24 0914   BP: 122/78   Resp: 18   SpO2: 98%       PHYSICAL EXAM    GENERAL: No acute distress, pleasant, well-appearing  HEENT: Normocephalic, atraumatic, Pupils equal and round  CARDIOVASCULAR: Well perfused, No peripheral cyanosis  PULMONARY: Good chest wall excursion, breathing unlabored  PSYCH: Appropriate affect and insight, non-pressured speech  SKIN: No rashes or lesions  MUSCULOSKELETAL:  Inspection: The back and extremities are symmetric and aligned. Muscle bulk is normal in appearance.  Palpation: There is tenderness to palpation along the cervical and lumbar paraspinal musculature bilaterally  Lumbar range of motion is full  NEUROMUSCULAR:  Patient ambulates unassisted  Gait is nonantalgic  Sensation

## 2024-08-27 NOTE — PROGRESS NOTES
HPI Notes    Name: Cristóbal Rajan  : 1975         Chief Complaint:     Chief Complaint   Patient presents with    Leg Swelling     Bilateral leg swelling, pt states it has improved since last visit. Patient has been wearing compression stockings and wrapping them.     Hand Pain     Blisters on left hand, showed up on Saturday. Pt states hand is painful and started as a rash.       History of Present Illness:        HPI  Pt is a 49 yo male who presents for follow up. Pt was started on lasix 20mg daily. Has been wearing compression stockings and also wrapping legs. Symptoms have improved.     Complains of erythematous fluid-filled blisters to left lateral hand. Started suddenly on 24 after visiting his father in the hospital. Denies any injury.     Past Medical History:     Past Medical History:   Diagnosis Date    Chicken pox     Chronic back pain     Diabetes mellitus (HCC)     Gout     Headache(784.0)     Hepatitis B 2017    Core antibody IgG/IgM -- positive    Hepatitis C 2017    Viral RNA by PCR -- detected    Hernia     Hypertension     Osteoarthritis       Reviewed all health maintenance requirements and ordered appropriate tests  Health Maintenance Due   Topic Date Due    Pneumococcal 0-64 years Vaccine (1 of 2 - PCV) Never done    Hepatitis A vaccine (1 of 2 - Risk 2-dose series) Never done    Hepatitis B vaccine (1 of 3 - 19+ 3-dose series) Never done    Colorectal Cancer Screen  Never done    Lipids  2021    GFR test (Diabetes, CKD 3-4, OR last GFR 15-59)  2021    Diabetic retinal exam  2022    COVID-19 Vaccine (3 - -24 season) 2023    Diabetic foot exam  2024    Diabetic Alb to Cr ratio (uACR) test  2024    Flu vaccine (1) Never done       Past Surgical History:     Past Surgical History:   Procedure Laterality Date    HAND SURGERY Right     cyst removal    PAIN MANAGEMENT PROCEDURE N/A 2021    L5-S1 INTERLAMINAR EPIDURAL INJECTION  venom    Social History:     Tobacco:    reports that he quit smoking about 18 months ago. His smoking use included cigarettes. He has been exposed to tobacco smoke. He has never used smokeless tobacco.  Alcohol:      reports no history of alcohol use.  Drug Use:  reports no history of drug use.    Family History:     Family History   Problem Relation Age of Onset    Diabetes Mother     Diabetes Father     Heart Disease Brother        Review of Systems:         Review of Systems   Constitutional:  Negative for chills and fever.   Respiratory:  Negative for cough and shortness of breath.    Cardiovascular:  Positive for leg swelling. Negative for palpitations.   Gastrointestinal:  Negative for diarrhea, nausea and vomiting.   Skin:  Positive for rash and wound.   Neurological:  Negative for dizziness, seizures and headaches.         Physical Exam:     Vitals:  /76 (Site: Right Upper Arm, Position: Sitting)   Pulse 95   SpO2 96%       Physical Exam  Vitals and nursing note reviewed.   Constitutional:       Appearance: He is well-developed.   Cardiovascular:      Rate and Rhythm: Normal rate and regular rhythm.      Heart sounds: S1 normal and S2 normal.   Pulmonary:      Effort: Pulmonary effort is normal. No respiratory distress.      Breath sounds: Normal breath sounds.   Abdominal:      General: Bowel sounds are normal.      Palpations: Abdomen is soft.      Tenderness: There is no abdominal tenderness.   Skin:     General: Skin is warm and dry.      Comments: Erythematous fluid-filled blisters to left lateral hand   Psychiatric:         Behavior: Behavior is cooperative.               Data:     Lab Results   Component Value Date/Time     08/20/2020 04:14 PM    K 4.1 08/20/2020 04:14 PM     08/20/2020 04:14 PM    CO2 28 08/20/2020 04:14 PM    BUN 15 08/20/2020 04:14 PM    CREATININE 0.86 08/20/2020 04:14 PM    GLUCOSE 142 08/20/2020 04:14 PM    GLUCOSE 127 12/26/2011 12:23 AM    BILITOT 1.01

## 2024-09-05 DIAGNOSIS — E11.42 TYPE 2 DIABETES MELLITUS WITH DIABETIC POLYNEUROPATHY, WITHOUT LONG-TERM CURRENT USE OF INSULIN (HCC): ICD-10-CM

## 2024-09-05 RX ORDER — GLIPIZIDE 5 MG/1
5 TABLET, FILM COATED, EXTENDED RELEASE ORAL DAILY
Qty: 30 TABLET | Refills: 2 | Status: SHIPPED | OUTPATIENT
Start: 2024-09-05

## 2024-09-05 RX ORDER — METFORMIN HCL 500 MG
TABLET, EXTENDED RELEASE 24 HR ORAL
Qty: 60 TABLET | Refills: 2 | Status: SHIPPED | OUTPATIENT
Start: 2024-09-05

## 2024-09-05 NOTE — TELEPHONE ENCOUNTER
Rx refill request via [facility]  Glipizide 5mg QD    Metformin 500mg (pt not taking metformin as of 6-17-24)  Last OV for DM 6-17-24  Next appt- 12/2/24

## 2024-09-16 RX ORDER — HYDROXYZINE PAMOATE 25 MG/1
CAPSULE ORAL
Qty: 90 CAPSULE | Refills: 1 | Status: SHIPPED | OUTPATIENT
Start: 2024-09-16

## 2024-09-17 ENCOUNTER — TELEPHONE (OUTPATIENT)
Dept: PAIN MANAGEMENT | Age: 49
End: 2024-09-17

## 2024-09-17 NOTE — TELEPHONE ENCOUNTER
Staff contacted the office to advise of peer to peer request with Case # [id number] for MRI of Lumbar Spine- this was denied due to no patient notes. There is a 5 day window for the peer to peer. Please advise. Thank you

## 2024-09-23 ENCOUNTER — TELEPHONE (OUTPATIENT)
Dept: PAIN MANAGEMENT | Age: 49
End: 2024-09-23

## 2024-09-26 ENCOUNTER — OFFICE VISIT (OUTPATIENT)
Dept: FAMILY MEDICINE CLINIC | Age: 49
End: 2024-09-26

## 2024-09-26 VITALS — DIASTOLIC BLOOD PRESSURE: 84 MMHG | OXYGEN SATURATION: 97 % | HEART RATE: 90 BPM | SYSTOLIC BLOOD PRESSURE: 130 MMHG

## 2024-09-26 DIAGNOSIS — M67.911 DYSFUNCTION OF RIGHT ROTATOR CUFF: ICD-10-CM
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
## 2024-09-26 RX ORDER — LIDOCAINE 50 MG/G
1 PATCH TOPICAL EVERY 24 HOURS
COMMUNITY
Start: 2024-09-25 | End: 2024-10-25

## 2024-09-26 RX ORDER — ACETAMINOPHEN 325 MG/1
650 TABLET ORAL EVERY 6 HOURS PRN
COMMUNITY
Start: 2024-09-25 | End: 2024-10-05

## 2024-09-26 ASSESSMENT — ENCOUNTER SYMPTOMS
VOMITING: 0
NAUSEA: 0
DIARRHEA: 0
COUGH: 0
SHORTNESS OF BREATH: 0

## 2024-09-27 ENCOUNTER — HOSPITAL ENCOUNTER (OUTPATIENT)
Dept: MRI IMAGING | Age: 49
Discharge: HOME | End: 2024-09-29
Payer: COMMERCIAL

## 2024-09-27 DIAGNOSIS — M67.911 DYSFUNCTION OF RIGHT ROTATOR CUFF: ICD-10-CM
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
## 2024-09-27 PROCEDURE — 73221 MRI JOINT UPR EXTREM W/O DYE: CPT

## 2024-09-30 ENCOUNTER — TELEPHONE (OUTPATIENT)
Dept: FAMILY MEDICINE CLINIC | Age: 49
End: 2024-09-30

## 2024-09-30 DIAGNOSIS — M75.101 TEAR OF RIGHT SUPRASPINATUS TENDON: Primary | ICD-10-CM

## 2024-09-30 NOTE — TELEPHONE ENCOUNTER
Advised pt of provider's notes, voiced understanding   Pt would like to see a surgeon at [facility] pending for approval

## 2024-09-30 NOTE — TELEPHONE ENCOUNTER
----- Message from Alan Antonio DNP sent at 9/30/2024  8:38 AM EDT -----  Please let patient know there is a full-thickness tear of the supraspinatus tendon.  This is a serious injury and I need to send him to orthopedics.  Please find out which orthopedic surgeon he would like to go to.

## 2024-09-30 NOTE — TELEPHONE ENCOUNTER
Patient calling to check on his MRI results.  I did tell patient Jose was out of office on Friday.     Health Maintenance   Topic Date Due    Pneumococcal 0-64 years Vaccine (1 of 2 - PCV) Never done    Hepatitis A vaccine (1 of 2 - Risk 2-dose series) Never done    Hepatitis B vaccine (1 of 3 - 19+ 3-dose series) Never done    Colorectal Cancer Screen  Never done    Lipids  08/20/2021    GFR test (Diabetes, CKD 3-4, OR last GFR 15-59)  08/20/2021    Diabetic retinal exam  11/01/2022    Diabetic foot exam  05/22/2024    Diabetic Alb to Cr ratio (uACR) test  05/22/2024    Flu vaccine (1) Never done    COVID-19 Vaccine (3 - 2023-24 season) 09/01/2024    Depression Screen  03/14/2025    A1C test (Diabetic or Prediabetic)  06/17/2025    DTaP/Tdap/Td vaccine (2 - Td or Tdap) 07/31/2026    HIV screen  Completed    Hib vaccine  Aged Out    Polio vaccine  Aged Out    Meningococcal (ACWY) vaccine  Aged Out             (applicable per patient's age: Cancer Screenings, Depression Screening, Fall Risk Screening, Immunizations)    Hemoglobin A1C (%)   Date Value   06/17/2024 7.7   03/14/2024 8.2   05/22/2023 6.0     AST (U/L)   Date Value   08/20/2020 24     ALT (U/L)   Date Value   08/20/2020 24     BUN (mg/dL)   Date Value   08/20/2020 15      (goal A1C is < 7)   (goal LDL is <100) need 30-50% reduction from baseline     BP Readings from Last 3 Encounters:   09/26/24 130/84   08/27/24 136/76   08/27/24 122/78    (goal /80)      All Future Testing planned in [facility]:  Lab Frequency Next Occurrence   MRI CERVICAL SPINE WO CONTRAST Once 08/27/2024   MRI LUMBAR SPINE WO CONTRAST Once 08/27/2024       Next Visit Date:  Future Appointments   Date Time Provider Department Center   12/12/2024  9:30 AM Shaggy Wilson DO [facility]   12/12/2024 10:40 AM Alan Antonio DNP [facility]            Patient Active Problem List:     Primary hypertension     Arthritis     Gout     Mass of right wrist

## 2024-10-16 DIAGNOSIS — E11.42 TYPE 2 DIABETES MELLITUS WITH DIABETIC POLYNEUROPATHY, WITHOUT LONG-TERM CURRENT USE OF INSULIN (HCC): ICD-10-CM

## 2024-10-16 RX ORDER — ALLOPURINOL 300 MG/1
300 TABLET ORAL DAILY
Qty: 90 TABLET | Refills: 1 | Status: SHIPPED | OUTPATIENT
Start: 2024-10-16

## 2024-10-16 RX ORDER — GABAPENTIN 800 MG/1
800 TABLET ORAL 4 TIMES DAILY
Qty: 120 TABLET | Refills: 1 | Status: SHIPPED | OUTPATIENT
Start: 2024-10-16 | End: 2024-12-15

## 2024-10-16 RX ORDER — PANTOPRAZOLE SODIUM 40 MG/1
40 TABLET, DELAYED RELEASE ORAL DAILY
Qty: 90 TABLET | Refills: 1 | Status: SHIPPED | OUTPATIENT
Start: 2024-10-16

## 2024-10-16 NOTE — TELEPHONE ENCOUNTER
Rx refill request via [employer]  Pantoprazole 40mg qd, gabapentin 800mg TID, allopurinol 300mg qd  Last OV 9-26-24  Next appt- 12-12-24

## 2024-11-06 RX ORDER — CLONIDINE HYDROCHLORIDE 0.2 MG/1
0.2 TABLET ORAL 2 TIMES DAILY
Qty: 180 TABLET | Refills: 1 | Status: SHIPPED | OUTPATIENT
Start: 2024-11-06

## 2024-11-06 RX ORDER — ATORVASTATIN CALCIUM 80 MG/1
80 TABLET, FILM COATED ORAL DAILY
Qty: 90 TABLET | Refills: 1 | Status: SHIPPED | OUTPATIENT
Start: 2024-11-06

## 2024-11-06 RX ORDER — LISINOPRIL 40 MG/1
40 TABLET ORAL DAILY
Qty: 90 TABLET | Refills: 1 | Status: SHIPPED | OUTPATIENT
Start: 2024-11-06

## 2024-11-06 NOTE — TELEPHONE ENCOUNTER
Last OV: 9/26/2024  fall 06/17/24  DM,GERD,ANSLEY   Last RX:    Next scheduled apt: 12/12/2024   3 MONTHS DM        Surescript requesting a refill   Medication pending for approval

## 2024-11-21 RX ORDER — HYDROXYZINE PAMOATE 25 MG/1
CAPSULE ORAL
Qty: 90 CAPSULE | Refills: 1 | Status: SHIPPED | OUTPATIENT
Start: 2024-11-21

## 2024-11-21 NOTE — TELEPHONE ENCOUNTER
Vistaril 25 mg    DM- [facility] Maintenance   Topic Date Due    Pneumococcal 0-64 years Vaccine (1 of 2 - PCV) Never done    Hepatitis A vaccine (1 of 2 - Risk 2-dose series) Never done    Hepatitis B vaccine (1 of 3 - 19+ 3-dose series) Never done    Colorectal Cancer Screen  Never done    Lipids  08/20/2021    GFR test (Diabetes, CKD 3-4, OR last GFR 15-59)  08/20/2021    Diabetic retinal exam  11/01/2022    Diabetic foot exam  05/22/2024    Diabetic Alb to Cr ratio (uACR) test  05/22/2024    Flu vaccine (1) Never done    COVID-19 Vaccine (3 - 2023-24 season) 09/01/2024    Depression Screen  03/14/2025    A1C test (Diabetic or Prediabetic)  06/17/2025    DTaP/Tdap/Td vaccine (2 - Td or Tdap) 07/31/2026    HIV screen  Completed    Hib vaccine  Aged Out    Polio vaccine  Aged Out    Meningococcal (ACWY) vaccine  Aged Out             (applicable per patient's age: Cancer Screenings, Depression Screening, Fall Risk Screening, Immunizations)    Hemoglobin A1C (%)   Date Value   06/17/2024 7.7   03/14/2024 8.2   05/22/2023 6.0     AST (U/L)   Date Value   08/20/2020 24     ALT (U/L)   Date Value   08/20/2020 24     BUN (mg/dL)   Date Value   08/20/2020 15      (goal A1C is < 7)   (goal LDL is <100) need 30-50% reduction from baseline     BP Readings from Last 3 Encounters:   09/26/24 130/84   08/27/24 136/76   08/27/24 122/78    (goal /80)      All Future Testing planned in [facility]:  Lab Frequency Next Occurrence   MRI CERVICAL SPINE WO CONTRAST Once 08/27/2024   MRI LUMBAR SPINE WO CONTRAST Once 08/27/2024       Next Visit Date:  Future Appointments   Date Time Provider Department Center   12/12/2024  9:30 AM Shaggy Wilson DO [facility]   12/12/2024 10:40 AM Alan Antonio DNP [facility]            Patient Active Problem List:     Primary hypertension     Arthritis     Gout     Mass of right wrist     GERD (gastroesophageal reflux disease)     History of heroin abuse

## 2024-11-21 NOTE — TELEPHONE ENCOUNTER
Last OV: 9/26/2024  fall   Last RX:    Next scheduled apt: 12/12/2024   3 months           Pt requesting a refill   Medication pending for approval

## 2024-12-06 DIAGNOSIS — E11.42 TYPE 2 DIABETES MELLITUS WITH DIABETIC POLYNEUROPATHY, WITHOUT LONG-TERM CURRENT USE OF INSULIN (HCC): ICD-10-CM

## 2024-12-06 RX ORDER — MELOXICAM 15 MG/1
15 TABLET ORAL DAILY
Qty: 90 TABLET | Refills: 1 | Status: SHIPPED | OUTPATIENT
Start: 2024-12-06

## 2024-12-06 RX ORDER — METFORMIN HYDROCHLORIDE 500 MG/1
1000 TABLET, EXTENDED RELEASE ORAL
Qty: 60 TABLET | Refills: 2 | Status: SHIPPED | OUTPATIENT
Start: 2024-12-06

## 2024-12-06 RX ORDER — GLIPIZIDE 5 MG/1
5 TABLET, FILM COATED, EXTENDED RELEASE ORAL DAILY
Qty: 30 TABLET | Refills: 2 | Status: SHIPPED | OUTPATIENT
Start: 2024-12-06

## 2024-12-06 NOTE — TELEPHONE ENCOUNTER
Rx refill request via [employer]  Metformin 500mg 2 tabs qd, glipizide 5mg qd, meloxicam 15mg qd  Last OV for DM 6/17/24  Next appt- 12/12/24

## 2024-12-11 RX ORDER — GABAPENTIN 800 MG/1
800 TABLET ORAL 4 TIMES DAILY
Qty: 120 TABLET | Refills: 1 | Status: SHIPPED | OUTPATIENT
Start: 2024-12-11 | End: 2025-02-09

## 2024-12-11 NOTE — TELEPHONE ENCOUNTER
Last visit:  9/26/2024  Next Visit Date:    Future Appointments   Date Time Provider Department Center   12/12/2024  9:30 AM Shaggy Wilson DO [facility]   12/12/2024 10:40 AM Alan Antonio DNP [facility]         Medication List:  Prior to Admission medications    Medication Sig Start Date End Date Taking? Authorizing Provider   metFORMIN (GLUCOPHAGE-XR) 500 MG extended release tablet TAKE 2 TABLETS BY MOUTH DAILY WITH BREAKFAST 12/6/24   Alan Antonio DNP   glipiZIDE (GLUCOTROL XL) 5 MG extended release tablet TAKE 1 TABLET BY MOUTH DAILY 12/6/24   Alan Antonio DNP   meloxicam (MOBIC) 15 MG tablet TAKE 1 TABLET BY MOUTH DAILY 12/6/24   Alan Antonio DNP   hydrOXYzine pamoate (VISTARIL) 25 MG capsule TAKE 1 CAPSULE BY MOUTH THREE TIMES DAILY AS NEEDED FOR ITCHING 11/21/24   Alan Antonio DNP   cloNIDine (CATAPRES) 0.2 MG tablet TAKE 1 TABLET BY MOUTH TWICE DAILY 11/6/24   Alan Antonio DNP   atorvastatin (LIPITOR) 80 MG tablet TAKE 1 TABLET BY MOUTH DAILY 11/6/24   Alan Antonio DNP   tiZANidine (ZANAFLEX) 4 MG tablet TAKE 1 TABLET BY MOUTH EVERY NIGHT AS NEEDED for neck pain or muscle spasms 11/6/24   Alan Antonio DNP   lisinopril (PRINIVIL;ZESTRIL) 40 MG tablet Take 1 tablet by mouth daily 11/6/24   Alan Antonio DNP   pantoprazole (PROTONIX) 40 MG tablet TAKE 1 TABLET BY MOUTH DAILY 10/16/24   Leanna Valdez DO   gabapentin (NEURONTIN) 800 MG tablet Take 1 tablet by mouth in the morning, at noon, in the evening, and at bedtime for 60 days. 10/16/24 12/15/24  Leanna Valdez DO   allopurinol (ZYLOPRIM) 300 MG tablet TAKE 1 TABLET BY MOUTH EVERY DAY 10/16/24   Leanna Valdez DO   furosemide (LASIX) 20 MG tablet Take 1 tablet by mouth daily 7/15/24   Alan Antonio, RENETTA   Lancets MISC Check blood sugar twice daily. Whatever brand covered by insurance. 7/29/21   Alan Antonio, RENETTA   blood glucose

## 2024-12-12 ENCOUNTER — OFFICE VISIT (OUTPATIENT)
Dept: PAIN MANAGEMENT | Age: 49
End: 2024-12-12
Payer: COMMERCIAL

## 2024-12-12 ENCOUNTER — OFFICE VISIT (OUTPATIENT)
Dept: FAMILY MEDICINE CLINIC | Age: 49
End: 2024-12-12
Payer: COMMERCIAL

## 2024-12-12 VITALS
WEIGHT: 285 LBS | SYSTOLIC BLOOD PRESSURE: 118 MMHG | HEIGHT: 74 IN | DIASTOLIC BLOOD PRESSURE: 84 MMHG | BODY MASS INDEX: 36.57 KG/M2 | OXYGEN SATURATION: 95 % | HEART RATE: 85 BPM

## 2024-12-12 VITALS
HEART RATE: 93 BPM | OXYGEN SATURATION: 96 % | DIASTOLIC BLOOD PRESSURE: 62 MMHG | WEIGHT: 285 LBS | SYSTOLIC BLOOD PRESSURE: 114 MMHG | BODY MASS INDEX: 36.59 KG/M2

## 2024-12-12 DIAGNOSIS — M50.30 DEGENERATIVE DISC DISEASE, CERVICAL: ICD-10-CM

## 2024-12-12 DIAGNOSIS — E11.41 TYPE 2 DIABETES MELLITUS WITH DIABETIC MONONEUROPATHY, WITHOUT LONG-TERM CURRENT USE OF INSULIN (HCC): Primary | ICD-10-CM

## 2024-12-12 DIAGNOSIS — M79.18 MYOFASCIAL PAIN SYNDROME: ICD-10-CM

## 2024-12-12 DIAGNOSIS — M47.812 CERVICAL SPONDYLOSIS: ICD-10-CM

## 2024-12-12 DIAGNOSIS — M51.369 DEGENERATION OF INTERVERTEBRAL DISC OF LUMBAR REGION, UNSPECIFIED WHETHER PAIN PRESENT: ICD-10-CM

## 2024-12-12 DIAGNOSIS — M47.817 LUMBOSACRAL SPONDYLOSIS WITHOUT MYELOPATHY: Primary | ICD-10-CM

## 2024-12-12 LAB — HBA1C MFR BLD: 6.1 %

## 2024-12-12 PROCEDURE — 1036F TOBACCO NON-USER: CPT | Performed by: NURSE PRACTITIONER

## 2024-12-12 PROCEDURE — 3079F DIAST BP 80-89 MM HG: CPT | Performed by: NURSE PRACTITIONER

## 2024-12-12 PROCEDURE — G8417 CALC BMI ABV UP PARAM F/U: HCPCS | Performed by: NURSE PRACTITIONER

## 2024-12-12 PROCEDURE — G8427 DOCREV CUR MEDS BY ELIG CLIN: HCPCS | Performed by: NURSE PRACTITIONER

## 2024-12-12 PROCEDURE — G8427 DOCREV CUR MEDS BY ELIG CLIN: HCPCS | Performed by: STUDENT IN AN ORGANIZED HEALTH CARE EDUCATION/TRAINING PROGRAM

## 2024-12-12 PROCEDURE — 3074F SYST BP LT 130 MM HG: CPT | Performed by: STUDENT IN AN ORGANIZED HEALTH CARE EDUCATION/TRAINING PROGRAM

## 2024-12-12 PROCEDURE — G8417 CALC BMI ABV UP PARAM F/U: HCPCS | Performed by: STUDENT IN AN ORGANIZED HEALTH CARE EDUCATION/TRAINING PROGRAM

## 2024-12-12 PROCEDURE — 2022F DILAT RTA XM EVC RTNOPTHY: CPT | Performed by: NURSE PRACTITIONER

## 2024-12-12 PROCEDURE — 3074F SYST BP LT 130 MM HG: CPT | Performed by: NURSE PRACTITIONER

## 2024-12-12 PROCEDURE — 1036F TOBACCO NON-USER: CPT | Performed by: STUDENT IN AN ORGANIZED HEALTH CARE EDUCATION/TRAINING PROGRAM

## 2024-12-12 PROCEDURE — 3044F HG A1C LEVEL LT 7.0%: CPT | Performed by: NURSE PRACTITIONER

## 2024-12-12 PROCEDURE — 99213 OFFICE O/P EST LOW 20 MIN: CPT | Performed by: NURSE PRACTITIONER

## 2024-12-12 PROCEDURE — G8484 FLU IMMUNIZE NO ADMIN: HCPCS | Performed by: STUDENT IN AN ORGANIZED HEALTH CARE EDUCATION/TRAINING PROGRAM

## 2024-12-12 PROCEDURE — 83036 HEMOGLOBIN GLYCOSYLATED A1C: CPT | Performed by: NURSE PRACTITIONER

## 2024-12-12 PROCEDURE — 3078F DIAST BP <80 MM HG: CPT | Performed by: STUDENT IN AN ORGANIZED HEALTH CARE EDUCATION/TRAINING PROGRAM

## 2024-12-12 PROCEDURE — 99214 OFFICE O/P EST MOD 30 MIN: CPT | Performed by: STUDENT IN AN ORGANIZED HEALTH CARE EDUCATION/TRAINING PROGRAM

## 2024-12-12 PROCEDURE — G8484 FLU IMMUNIZE NO ADMIN: HCPCS | Performed by: NURSE PRACTITIONER

## 2024-12-12 RX ORDER — FUROSEMIDE 20 MG/1
20 TABLET ORAL DAILY
Qty: 60 TABLET | Refills: 2 | Status: SHIPPED | OUTPATIENT
Start: 2024-12-12

## 2024-12-12 ASSESSMENT — ENCOUNTER SYMPTOMS
VISUAL CHANGE: 0
NAUSEA: 0
COUGH: 0
SHORTNESS OF BREATH: 0
DIARRHEA: 0
VOMITING: 0

## 2024-12-12 NOTE — TELEPHONE ENCOUNTER
Rx refill request via [employer]  Furosemide 20mg qd  Last OV 8-27-24  Next appt- Today

## 2024-12-12 NOTE — PROGRESS NOTES
HPI Notes    Name: Cristóbal Rajan  : 1975         Chief Complaint:     Chief Complaint   Patient presents with    Diabetes     3m f/u  Last A1c was 7.7 on 24.  Glipizide 5mg QD.       History of Present Illness:        Diabetes  He presents for his follow-up diabetic visit. He has type 2 diabetes mellitus. His disease course has been stable. There are no hypoglycemic associated symptoms. Pertinent negatives for hypoglycemia include no dizziness, headaches or seizures. Pertinent negatives for diabetes include no chest pain, no fatigue, no visual change and no weight loss. There are no hypoglycemic complications. Symptoms are stable. Risk factors for coronary artery disease include diabetes mellitus, male sex and obesity. Current diabetic treatment includes diet and oral agent (monotherapy). He is compliant with treatment all of the time.       Past Medical History:     Past Medical History:   Diagnosis Date    Chicken pox     Chronic back pain     Diabetes mellitus (HCC)     Gout     Headache(784.0)     Hepatitis B 2017    Core antibody IgG/IgM -- positive    Hepatitis C 2017    Viral RNA by PCR -- detected    Hernia     Hypertension     Osteoarthritis       Reviewed all health maintenance requirements and ordered appropriate tests  Health Maintenance Due   Topic Date Due    Pneumococcal 0-64 years Vaccine (1 of 2 - PCV) Never done    Hepatitis A vaccine (1 of 2 - Risk 2-dose series) Never done    Hepatitis B vaccine (1 of 3 - 19+ 3-dose series) Never done    Colorectal Cancer Screen  Never done    Lipids  2021    GFR test (Diabetes, CKD 3-4, OR last GFR 15-59)  2021    Diabetic retinal exam  2022    Diabetic foot exam  2024    Diabetic Alb to Cr ratio (uACR) test  2024    Flu vaccine (1) Never done    COVID-19 Vaccine (3 - - season) 2024       Past Surgical History:     Past Surgical History:   Procedure Laterality Date    HAND SURGERY Right

## 2024-12-12 NOTE — PROGRESS NOTES
Chronic Pain Clinic Note     Encounter Date: 12/12/2024     SUBJECTIVE:  Chief Complaint   Patient presents with    Follow-up       History of Present Illness:   Cristóbal Rajan is a 49 y.o. male who presents to go over MRI results.     Medication Refill: n/a     Current Complaints of Pain:   Location: Low back, neck  Radiation: None  Severity: moderate  Pain Numerical Score -  6-7  Average: 7     Highest: 10  Lowest: 5  Character/Quality: Complains of pain that is tight, tingling, sharp & shooting at times   Timing: Constant, worse with prolonged sitting/standing/walking  Associated symptoms:   Numbness: Left arm  Weakness: No  Exacerbating factors: Standing, walking  Alleviating factors: heating pad, hot showers  Length of time pain has been present: Started on - chronic   Inciting event/injury: No  Bowel/Bladder incontinence: no  Falls: Yes   Physical Therapy: Yes    History of Interventions:   Surgery: No previous lumbar/cervical surgeries  Injections: Yes- Dr Campbell    Imaging:    MRI Cervical 10/2/24    MRI Lumbar 10/2/24    Past Medical History:   Diagnosis Date    Chicken pox     Chronic back pain     Diabetes mellitus (HCC)     Gout     Headache(784.0)     Hepatitis B 03/30/2017    Core antibody IgG/IgM -- positive    Hepatitis C 03/30/2017    Viral RNA by PCR -- detected    Hernia     Hypertension     Osteoarthritis        Past Surgical History:   Procedure Laterality Date    HAND SURGERY Right     cyst removal    PAIN MANAGEMENT PROCEDURE N/A 12/17/2021    L5-S1 INTERLAMINAR EPIDURAL INJECTION performed by Paresh Campbell MD at [facility] OR       Family History   Problem Relation Age of Onset    Diabetes Mother     Diabetes Father     Heart Disease Brother        Social History     Socioeconomic History    Marital status:      Spouse name: Not on file    Number of children: Not on file    Years of education: Not on file    Highest education level: Not on file   Occupational History    Not on

## 2024-12-12 NOTE — PATIENT INSTRUCTIONS
SURVEY:    You may be receiving a survey from [facility] regarding your visit today.    Please complete the survey to enable us to provide the highest quality of care to you and your family.    If you cannot score us a very good on any question, please call the office to discuss how we could have made your experience a very good one.    Thank you.  [facility]  MD Elsa Martinez, MD Shaggy Wilson, DO  Scottie Yuan, MD Marta Blevins, APRN-CNP  Marianne Mcgill, Practice Manager  Anamika, CMA  Amy, CCMA  Konstantin, CMA  Noemi, CMA  Richard, PSC   Jami, LPN  Kristina, CMA

## 2025-01-06 RX ORDER — HYDROXYZINE PAMOATE 25 MG/1
CAPSULE ORAL
Qty: 90 CAPSULE | Refills: 1 | Status: SHIPPED | OUTPATIENT
Start: 2025-01-06

## 2025-02-11 RX ORDER — GABAPENTIN 800 MG/1
TABLET ORAL
Qty: 120 TABLET | Refills: 1 | Status: SHIPPED | OUTPATIENT
Start: 2025-02-11 | End: 2025-04-12

## 2025-02-11 NOTE — TELEPHONE ENCOUNTER
Last OV: 12/12/2024  DM  02/07/22 LOWER BACK PAIN   Last RX:    Next scheduled apt: 6/12/2025  6 months DM         Surescript requesting a refill      
Orthopedics
Cardiology

## 2025-03-17 RX ORDER — GLIPIZIDE 5 MG/1
5 TABLET, FILM COATED, EXTENDED RELEASE ORAL DAILY
Qty: 30 TABLET | Refills: 2 | Status: SHIPPED | OUTPATIENT
Start: 2025-03-17

## 2025-03-17 RX ORDER — HYDROXYZINE PAMOATE 25 MG/1
CAPSULE ORAL
Qty: 90 CAPSULE | Refills: 2 | Status: SHIPPED | OUTPATIENT
Start: 2025-03-17

## 2025-03-17 NOTE — TELEPHONE ENCOUNTER
Rx refill request via [employer]  Glipizide 5mg qd, hydroxyzine 25mg tid prn  Last OV 12/12/24  Next appt- 6-12-25

## 2025-04-16 DIAGNOSIS — E11.42 TYPE 2 DIABETES MELLITUS WITH DIABETIC POLYNEUROPATHY, WITHOUT LONG-TERM CURRENT USE OF INSULIN (HCC): ICD-10-CM

## 2025-04-16 NOTE — TELEPHONE ENCOUNTER
Last OV: 12/12/2024  DM MONONEUROPATHY   Last RX:    Next scheduled apt: 6/12/2025   6 months DM         Surescript requesting a refill   Medication pending for approval

## 2025-04-16 NOTE — TELEPHONE ENCOUNTER
Last OV: 12/12/2024 DM 03/14/25 REFLUX   Last RX:    Next scheduled apt: 06/12/24 DM \    Surescript requesting a refill   Medication pending for approval

## 2025-04-17 RX ORDER — PANTOPRAZOLE SODIUM 40 MG/1
40 TABLET, DELAYED RELEASE ORAL DAILY
Qty: 90 TABLET | Refills: 1 | Status: SHIPPED | OUTPATIENT
Start: 2025-04-17

## 2025-04-17 RX ORDER — GABAPENTIN 800 MG/1
TABLET ORAL
Qty: 120 TABLET | Refills: 1 | Status: SHIPPED | OUTPATIENT
Start: 2025-04-17 | End: 2025-07-17

## 2025-04-17 RX ORDER — ALLOPURINOL 300 MG/1
300 TABLET ORAL DAILY
Qty: 90 TABLET | Refills: 1 | Status: SHIPPED | OUTPATIENT
Start: 2025-04-17

## 2025-05-19 RX ORDER — CLONIDINE HYDROCHLORIDE 0.2 MG/1
0.2 TABLET ORAL 2 TIMES DAILY
Qty: 180 TABLET | Refills: 1 | Status: SHIPPED | OUTPATIENT
Start: 2025-05-19

## 2025-05-19 RX ORDER — LISINOPRIL 40 MG/1
40 TABLET ORAL DAILY
Qty: 90 TABLET | Refills: 1 | Status: SHIPPED | OUTPATIENT
Start: 2025-05-19

## 2025-05-19 RX ORDER — ATORVASTATIN CALCIUM 80 MG/1
80 TABLET, FILM COATED ORAL DAILY
Qty: 90 TABLET | Refills: 1 | Status: SHIPPED | OUTPATIENT
Start: 2025-05-19

## 2025-05-19 NOTE — TELEPHONE ENCOUNTER
Last OV: 12/12/2024    Next scheduled apt: 6/12/2025      Patient requesting refill  Medications pending

## 2025-06-06 RX ORDER — GLIPIZIDE 5 MG/1
5 TABLET, FILM COATED, EXTENDED RELEASE ORAL DAILY
Qty: 30 TABLET | Refills: 2 | Status: SHIPPED | OUTPATIENT
Start: 2025-06-06

## 2025-06-06 RX ORDER — HYDROXYZINE PAMOATE 25 MG/1
CAPSULE ORAL
Qty: 90 CAPSULE | Refills: 2 | Status: SHIPPED | OUTPATIENT
Start: 2025-06-06

## 2025-06-06 RX ORDER — GABAPENTIN 800 MG/1
TABLET ORAL
Qty: 120 TABLET | Refills: 2 | Status: SHIPPED | OUTPATIENT
Start: 2025-06-06 | End: 2025-09-05

## 2025-06-06 NOTE — TELEPHONE ENCOUNTER
Last OV: 12/12/2024  DM   Last RX:    Next scheduled apt: 6/12/2025  6 MONTHS DM         Surescript requesting a refill   Medication pending for approval

## 2025-06-10 NOTE — PROGRESS NOTES
Diabetes     Gout  Taking    Allopurinol 300mg  Hypertension  Taking   Clonidine   Lisinopril  Gerd   Taking   Pantoprazole  Edema   Taking   Furosemide   Hyperlipidemia   Taking    Atorvastatin

## 2025-06-12 ENCOUNTER — OFFICE VISIT (OUTPATIENT)
Dept: FAMILY MEDICINE CLINIC | Age: 50
End: 2025-06-12

## 2025-06-12 VITALS
SYSTOLIC BLOOD PRESSURE: 112 MMHG | OXYGEN SATURATION: 95 % | HEART RATE: 97 BPM | HEIGHT: 74 IN | DIASTOLIC BLOOD PRESSURE: 60 MMHG | BODY MASS INDEX: 37.21 KG/M2 | WEIGHT: 289.9 LBS

## 2025-06-12 DIAGNOSIS — B18.2 CHRONIC HEPATITIS C WITHOUT HEPATIC COMA (HCC): ICD-10-CM

## 2025-06-12 DIAGNOSIS — E11.41 TYPE 2 DIABETES MELLITUS WITH DIABETIC MONONEUROPATHY, WITHOUT LONG-TERM CURRENT USE OF INSULIN (HCC): Primary | ICD-10-CM

## 2025-06-12 LAB — HBA1C MFR BLD: 6.3 %

## 2025-06-12 PROCEDURE — 3078F DIAST BP <80 MM HG: CPT | Performed by: NURSE PRACTITIONER

## 2025-06-12 PROCEDURE — 99213 OFFICE O/P EST LOW 20 MIN: CPT | Performed by: NURSE PRACTITIONER

## 2025-06-12 PROCEDURE — 83036 HEMOGLOBIN GLYCOSYLATED A1C: CPT | Performed by: NURSE PRACTITIONER

## 2025-06-12 PROCEDURE — 3074F SYST BP LT 130 MM HG: CPT | Performed by: NURSE PRACTITIONER

## 2025-06-12 PROCEDURE — 3044F HG A1C LEVEL LT 7.0%: CPT | Performed by: NURSE PRACTITIONER

## 2025-06-12 RX ORDER — CEPHALEXIN 500 MG/1
500 CAPSULE ORAL 4 TIMES DAILY
COMMUNITY
Start: 2025-06-02

## 2025-06-12 SDOH — ECONOMIC STABILITY: FOOD INSECURITY: WITHIN THE PAST 12 MONTHS, YOU WORRIED THAT YOUR FOOD WOULD RUN OUT BEFORE YOU GOT MONEY TO BUY MORE.: NEVER TRUE

## 2025-06-12 SDOH — ECONOMIC STABILITY: FOOD INSECURITY: WITHIN THE PAST 12 MONTHS, THE FOOD YOU BOUGHT JUST DIDN'T LAST AND YOU DIDN'T HAVE MONEY TO GET MORE.: NEVER TRUE

## 2025-06-12 ASSESSMENT — ENCOUNTER SYMPTOMS
VOMITING: 0
DIARRHEA: 0
SHORTNESS OF BREATH: 0
COUGH: 0
NAUSEA: 0

## 2025-06-12 ASSESSMENT — PATIENT HEALTH QUESTIONNAIRE - PHQ9
2. FEELING DOWN, DEPRESSED OR HOPELESS: NOT AT ALL
SUM OF ALL RESPONSES TO PHQ QUESTIONS 1-9: 0
1. LITTLE INTEREST OR PLEASURE IN DOING THINGS: NOT AT ALL
SUM OF ALL RESPONSES TO PHQ QUESTIONS 1-9: 0

## 2025-06-12 NOTE — PATIENT INSTRUCTIONS
SURVEY:    You may be receiving a survey from [facility] regarding your visit today.    You may get this in the mail, through your MyChart or in your email.     Please complete the survey to enable us to provide the highest quality of care to you and your family.      Thank you.    Clinical Care Team:         J CARLOS Chiang-TREY Kinsey                         Triage:         TREY Kim             Clerical Team:        Jadyn Muniz       [place] [phone]           Billing questions: [phone]           Medical Records Request: [phone]

## 2025-07-10 RX ORDER — FUROSEMIDE 20 MG/1
20 TABLET ORAL DAILY
Qty: 60 TABLET | Refills: 2 | Status: SHIPPED | OUTPATIENT
Start: 2025-07-10

## 2025-07-10 NOTE — TELEPHONE ENCOUNTER
Last visit:  6/12/2025 (DM)   Next Visit Date:    Future Appointments   Date Time Provider Department Center   12/15/2025  9:00 AM Alan Antonio DNP [facility] DE[facility]         Medication List:  Prior to Admission medications    Medication Sig Start Date End Date Taking? Authorizing Provider   cephALEXin (KEFLEX) 500 MG capsule Take 1 capsule by mouth 4 times daily 6/2/25   ProviderPilar MD   glipiZIDE (GLUCOTROL XL) 5 MG extended release tablet TAKE 1 TABLET BY MOUTH DAILY 6/6/25   Aaln Antonio DNP   hydrOXYzine pamoate (VISTARIL) 25 MG capsule TAKE 1 CAPSULE BY MOUTH THREE TIMES DAILY AS NEEDED FOR ITCHING 6/6/25   Alan Antonio DNP   gabapentin (NEURONTIN) 800 MG tablet TAKE 1 TABLET BY MOUTH EVERY MORNING, 1 TABLET AT NOON, 1 TABLET IN THE EVENING, and 1 TABLET AT BEDTIME 6/6/25 9/5/25  Alan Antonio DNP   tiZANidine (ZANAFLEX) 4 MG tablet TAKE 1 TABLET BY MOUTH EVERY NIGHT AS NEEDED for neck pain or FOR MUSCLE SPASMS 5/19/25   Alan Antonio DNP   atorvastatin (LIPITOR) 80 MG tablet TAKE 1 TABLET BY MOUTH DAILY 5/19/25   Alan Antonio DNP   lisinopril (PRINIVIL;ZESTRIL) 40 MG tablet TAKE 1 TABLET BY MOUTH DAILY 5/19/25   Alan Antonio DNP   cloNIDine (CATAPRES) 0.2 MG tablet TAKE 1 TABLET BY MOUTH TWICE DAILY 5/19/25   Alan Antonio DNP   pantoprazole (PROTONIX) 40 MG tablet TAKE 1 TABLET BY MOUTH ONCE DAILY 4/17/25   Alan Antonio DNP   allopurinol (ZYLOPRIM) 300 MG tablet TAKE 1 TABLET BY MOUTH ONCE DAILY 4/17/25   Alan Antonio DNP   furosemide (LASIX) 20 MG tablet TAKE 1 TABLET BY MOUTH DAILY 12/12/24   Alan Antonio DNP   Lancets MISC Check blood sugar twice daily. Whatever brand covered by insurance. 7/29/21   Alan Antonio DNP   blood glucose monitor strips Check twice daily. Whatever brand covered by insurance. 7/29/21   Alan Antonio, RENETTA   blood glucose monitor kit and supplies Check twice

## 2025-07-29 NOTE — TELEPHONE ENCOUNTER
-zoloft 100mg daily  Orders:    Thyroid Stimulating Hormone Reflex; Future     Patient asking for a new script for Hydrochlorothiazide - patient uses [place] Maintenance   Topic Date Due    Hepatitis A vaccine (1 of 2 - Risk 2-dose series) Never done    Pneumococcal 0-64 years Vaccine (1 of 2 - PPSV23) Never done    Diabetic retinal exam  Never done    Hepatitis B vaccine (1 of 3 - Risk 3-dose series) Never done    Colon cancer screen colonoscopy  Never done    Lipid screen  08/20/2021    Potassium monitoring  08/20/2021    Creatinine monitoring  08/20/2021    Flu vaccine (1) 09/01/2021    Diabetic foot exam  01/21/2022    Diabetic microalbuminuria test  01/21/2022    A1C test (Diabetic or Prediabetic)  07/29/2022    DTaP/Tdap/Td vaccine (2 - Td or Tdap) 07/31/2026    COVID-19 Vaccine  Completed    HIV screen  Completed    Hib vaccine  Aged Out    Meningococcal (ACWY) vaccine  Aged Out             (applicable per patient's age: Cancer Screenings, Depression Screening, Fall Risk Screening, Immunizations)    Hemoglobin A1C (%)   Date Value   07/29/2021 5.6   04/22/2021 5.5   01/20/2021 11.6     LDL Cholesterol (mg/dL)   Date Value   08/20/2020 45     AST (U/L)   Date Value   08/20/2020 24     ALT (U/L)   Date Value   08/20/2020 24     BUN (mg/dL)   Date Value   08/20/2020 15      (goal A1C is < 7)   (goal LDL is <100) need 30-50% reduction from baseline     BP Readings from Last 3 Encounters:   07/29/21 106/74   04/22/21 104/60   01/21/21 120/70    (goal /80)      All Future Testing planned in [facility]:  Lab Frequency Next Occurrence   COVID-19 Ambulatory Once 01/15/2021       Next Visit Date:  Future Appointments   Date Time Provider Debbie Rosales   11/1/2021  2:00 PM J CARLOS Snider - CNP [facility]            Patient Active Problem List:     HTN (hypertension)     Arthritis     Gout     Mass of right wrist     Gastroesophageal reflux disease without esophagitis     History of heroin abuse ([facility] [address]     Chronic hepatitis C without

## 2025-09-02 RX ORDER — GLIPIZIDE 5 MG/1
5 TABLET, FILM COATED, EXTENDED RELEASE ORAL DAILY
Qty: 30 TABLET | Refills: 2 | Status: SHIPPED | OUTPATIENT
Start: 2025-09-02 | End: 2025-12-01

## 2025-09-02 RX ORDER — HYDROXYZINE PAMOATE 25 MG/1
25 CAPSULE ORAL 3 TIMES DAILY PRN
Qty: 90 CAPSULE | Refills: 2 | Status: SHIPPED | OUTPATIENT
Start: 2025-09-02

## 2025-09-03 RX ORDER — GABAPENTIN 800 MG/1
TABLET ORAL
Qty: 120 TABLET | Refills: 2 | Status: SHIPPED | OUTPATIENT
Start: 2025-09-03 | End: 2025-12-02

## (undated) DEVICE — NERVE BLOCK TRAY: Brand: MEDLINE INDUSTRIES, INC.

## (undated) DEVICE — CANNULA NSL AD TUBE L7FT END TDL CO2 SAMP STD CONN DISP

## (undated) DEVICE — CUSTOM PACK: Brand: UNBRANDED

## (undated) DEVICE — GLOVE ORANGE PI 7   MSG9070

## (undated) DEVICE — SYRINGE 10CC LOSS OF RESISTANCE PLAS LS

## (undated) DEVICE — SYRINGE, LUER LOCK, 10ML: Brand: MEDLINE

## (undated) DEVICE — BANDAGE ADH W1XL3IN NAT FAB WVN FLX DURABLE N ADH PD SEAL

## (undated) DEVICE — NEEDLE HYPO 22GA L1.5IN BLK POLYPR HUB S STL REG BVL STR

## (undated) DEVICE — TOWEL,OR,DSP,ST,BLUE,STD,4/PK,20PK/CS: Brand: MEDLINE

## (undated) DEVICE — NEEDLE BRR E2230T 22GA TUOHY

## (undated) DEVICE — APPLICATOR MEDICATED 10.5 CC SOLUTION HI LT ORNG CHLORAPREP